# Patient Record
Sex: MALE | Race: WHITE | Employment: OTHER | URBAN - METROPOLITAN AREA
[De-identification: names, ages, dates, MRNs, and addresses within clinical notes are randomized per-mention and may not be internally consistent; named-entity substitution may affect disease eponyms.]

---

## 2017-04-17 ENCOUNTER — OFFICE VISIT (OUTPATIENT)
Dept: DERMATOLOGY | Facility: AMBULATORY SURGERY CENTER | Age: 82
End: 2017-04-17

## 2017-04-17 ENCOUNTER — HOSPITAL ENCOUNTER (OUTPATIENT)
Dept: LAB | Age: 82
Discharge: HOME | End: 2017-04-17

## 2017-04-17 VITALS
SYSTOLIC BLOOD PRESSURE: 140 MMHG | DIASTOLIC BLOOD PRESSURE: 72 MMHG | HEART RATE: 75 BPM | HEIGHT: 66 IN | TEMPERATURE: 97.5 F | OXYGEN SATURATION: 97 %

## 2017-04-17 DIAGNOSIS — L57.0 ACTINIC KERATOSIS: ICD-10-CM

## 2017-04-17 DIAGNOSIS — L90.5 SCAR CONDITION AND FIBROSIS OF SKIN: ICD-10-CM

## 2017-04-17 DIAGNOSIS — D18.01 CHERRY ANGIOMA: ICD-10-CM

## 2017-04-17 DIAGNOSIS — D48.5 NEOPLASM OF UNCERTAIN BEHAVIOR OF SKIN OF LOWER LEG: Primary | ICD-10-CM

## 2017-04-17 DIAGNOSIS — L82.1 SEBORRHEIC KERATOSES: ICD-10-CM

## 2017-04-17 DIAGNOSIS — Z85.828 HISTORY OF NONMELANOMA SKIN CANCER: ICD-10-CM

## 2017-04-17 NOTE — PROGRESS NOTES
Name: Aletha Morris       Age: 80 y.o. Date: 4/17/2017    Chief Complaint:   Chief Complaint   Patient presents with    Skin Exam     6 month, concerned about bump on Right lower leg, tender       Subjective:    HPI  Mr. Aletha Morris is a 80 y.o. male who presents for a full skin exam.  The patient's last skin exam was 6 months ago and the patient does have current complaints related to his skin. He reports a tender lesion on the right lower leg. He states this has been present for many weeks. Initially he was able to pull off the crust and it bled. However this crust did reform. He is also where multiple scaly lesions on his face including one on the left cheek that is sensitive. Mr. Aletha Morris is feeling well and in his usual state of health today. The patient's pertinent skin history includes : Multiple nonmelanoma skin cancer with the most recent being Mohs surgery for a basal cell carcinoma on the left nasal tip 11/3/16. Actinic keratosis with use of 5 fluorouracil. ROS: Constitutional: Negative. Dermatological : positive for - skin lesion changes      Social History     Social History    Marital status:      Spouse name: N/A    Number of children: N/A    Years of education: N/A     Occupational History    Not on file.      Social History Main Topics    Smoking status: Never Smoker    Smokeless tobacco: Never Used    Alcohol use 0.0 oz/week     1 - 2 Standard drinks or equivalent per week      Comment: occ, moderatley    Drug use: No    Sexual activity: Yes     Partners: Female     Birth control/ protection: None     Other Topics Concern    Not on file     Social History Narrative       Family History   Problem Relation Age of Onset    Heart Disease Mother     Heart Disease Father     Arthritis-rheumatoid Neg Hx        Past Medical History:   Diagnosis Date    Arthritis     knees liliya    Asthma     Empyema of lung ([facility] [address] 2/2013    Hypercholesteremia     Hypertension     Hypertrophy of prostate without urinary obstruction and other lower urinary tract symptoms (LUTS)     Skin cancer     Sun-damaged skin     Type II or unspecified type diabetes mellitus without mention of complication, not stated as uncontrolled 2/27/2013       Past Surgical History:   Procedure Laterality Date    HX APPENDECTOMY      when i was a kid    HX HEENT      cataracts removed bilat.  HX KNEE REPLACEMENT  01/25/14    right knee    HX ORTHOPAEDIC  2007    TKR Left    HX OTHER SURGICAL  2/8/2013    LVATS, pulmonary decortication       Current Outpatient Prescriptions   Medication Sig Dispense Refill    CHOLECALCIFEROL, VITAMIN D3, (VITAMIN D3 PO) Take  by mouth.  niacin ER (NIASPAN) 500 mg tablet Take 1 Tab by mouth daily (after dinner). 90 Tab 3    albuterol (PROVENTIL HFA) 90 mcg/actuation inhaler Take 2 Puffs by inhalation every four (4) hours as needed for Wheezing or Shortness of Breath. 1 Inhaler 1    losartan-hydrochlorothiazide (HYZAAR) 100-25 mg per tablet Take 1 Tab by mouth daily. 90 Tab 3    simvastatin (ZOCOR) 20 mg tablet TAKE 1 TABLET AT NIGHT 90 Tab 3    tamsulosin (FLOMAX) 0.4 mg capsule Take 0.4 mg by mouth daily.  celecoxib (CELEBREX) 200 mg capsule Take 1 Cap by mouth as directed. Take 1 tablet once or twice a day as needed for arthritis pain. 180 Cap 3    Blood-Glucose Meter (ONE TOUCH ULTRA SYSTEM KIT) monitoring kit Use as directed daily 1 Kit 0    glucose blood VI test strips (ONE TOUCH ULTRA TEST) strip Monitor BS daily 1 Package 11    Lancets (ONE TOUCH ULTRASOFT LANCETS) Misc Monitor BS daily 1 Package 11    aspirin 81 mg tablet Take 81 mg by mouth.  finasteride (PROSCAR) 5 mg tablet Take 5 mg by mouth daily.  bupivacaine-EPINEPHrine (MARCAINE-EPINEPHRINE) 0.25 %-1:200,000 soln Used for mohs surgery 1.5 mL 0    fluoruracil (CARAC) 0.5 % topical cream Apply  to affected area daily. Apply to lesions as directed.   Apply to right arm and hand for 1 month, then left arm and hand for 1 month, then front of lower legs for 1 month. 45 g 3    PREDNISONE by Does Not Apply route.  mometasone (NASONEX) 50 mcg/actuation nasal spray 2 Sprays by Both Nostrils route daily. 1 Container 5    HYDROcodone-homatropine (HYCODAN) 5-1.5 mg/5 mL (5 mL) syrup Take 5 mL by mouth three (3) times daily as needed. 150 mL 0    levofloxacin (LEVAQUIN) 500 mg tablet Take 1 Tab by mouth daily. 10 Tab 0    alfuzosin (UROXATRAL) 10 mg SR tablet Take 10 mg by mouth daily. Allergies   Allergen Reactions    Pcn [Penicillins] Swelling         Objective:    Visit Vitals    /72 (BP 1 Location: Left arm, BP Patient Position: Sitting)    Pulse 75    Temp 97.5 °F (36.4 °C)    Ht 5' 6\" (1.676 m)    SpO2 97%       Jazmin Alatorre is a 80 y.o. male who appears well and in no distress. He is awake, alert, and oriented. There is no preauricular, submandibular, or cervical lymphadenopathy. A skin examination was performed including his scalp, face (including eyelids), ears, neck, chest, back, abdomen, upper extremities (including digits/nails), lower extremities, breasts, buttocks; genital skin was not examined. There is a 1 cm x 1 cm erythematous papule on the right lower leg with a central crust, concerning for thickened AK versus squamous cell carcinoma. This is the patient's lesion of concern and is tender to touch. There are thin scaled actinic keratosis noted on the right helical rim right preauricular skin, left cheek, and left helical rim. There are numerous waxy macules and keratotic papules consistent with seborrheic keratoses, noninflamed. There are scattered cherry angiomas. There are multiple well-healed scars including the most recent of the left nasal tip, all without concerning features for lesion recurrence. He is fair skinned with lentigines on sun exposed areas. Assessment/Plan:  1.  Neoplasm of Uncertain Behavior, right lower leg, favor SCC. The differential diagnoses were discussed. Curettage was advised to address this lesion with malignant destruction. The procedure was reviewed and verbal and written consent were obtained. The risks of pain, bleeding, infection, scar, and recurrence of the lesion were discussed. I performed the procedure. The site was cleansed and anesthetized with 1% Lidocaine with Epinephrine 1:100,000. Curettage was performed by me to include a 2 mm margin, resulting in a post curettage defect size of 14 x 14 mm. Drysol was used for hemostasis. The wound was bandaged and care reviewed. The specimen was sent to pathology. I will contact the patient with the results and any further treatment that may be necessary. 2. Actinic Keratoses. The diagnosis of this precancerous lesion related to sun exposure was reviewed. Verbal consent was obtained. I treated 7 lesions with cryotherapy and post-cryotherapy care was reviewed. 3. Seborrheic keratoses. The diagnosis was reviewed and the patient was reassured that no treatment is needed for these benign lesions. 4. Personal history of skin cancer. I discussed sun protection, sunscreen use, the warning signs of skin cancer, the need for self-skin examinations, and the need for regular practitioner exams every 6 months. The patient should follow up sooner as needed if new, changing, or symptomatic skin lesions arise. 5. Cherry angiomas. The diagnosis was reviewed and the patient was reassured that no treatment is needed for these benign lesions. 6. Solar lentigos. The diagnosis and relationship to sun exposure was reviewed. Sun protection advised. [facility]   OFFICE PROCEDURE PROGRESS NOTE   Chart reviewed for the following:   I, [facility], have reviewed the History, Physical and updated the Allergic reactions for Esteban Borjas.     TIME OUT performed immediately prior to start of procedure: Rupert Solis, have performed the following reviews on Viri Victoria   prior to the start of the procedure:     * Patient was identified by name and date of birth   * Agreement on procedure being performed was verified   * Risks and Benefits explained to the patient   * Procedure site verified and marked as necessary   * Patient was positioned for comfort   * Consent was signed and verified     Time: 1050  Date of procedure: 4/17/2017  Procedure performed by: Kevin Coffey.  Lizzeth Solis  Provider assisted by: lpn   Patient assisted by: self   How tolerated by patient: tolerated the procedure well with no complications   Comments: none

## 2017-04-17 NOTE — MR AVS SNAPSHOT
Visit Information Date & Time Provider Department Dept. Phone Encounter #  
 4/17/2017 10:30 AM ASUNCION Bland57 [phone] [id number] Your Appointments 4/17/2017 10:30 AM  
Office Visit with ASUNCION Bland [address] [facility]) Appt Note: est pt; 6 mo skin exam - H/O NMSC wife is scheduled at  10:00  
 [address] [address] Upcoming Health Maintenance Date Due DTaP/Tdap/Td series (1 - Tdap) [birth year]/26/1954 ZOSTER VACCINE AGE 60> 3/26/1993 GLAUCOMA SCREENING Q2Y 3/26/1998 Pneumococcal 65+ Low/Medium Risk (1 of 2 - PCV13) 3/26/1998 MEDICARE YEARLY EXAM 3/26/1998 FOOT EXAM Q1 5/31/2014 MICROALBUMIN Q1 5/31/2014 LIPID PANEL Q1 5/31/2014 HEMOGLOBIN A1C Q6M 7/15/2014 EYE EXAM RETINAL OR DILATED Q1 12/18/2014 INFLUENZA AGE 9 TO ADULT 8/1/2016 Allergies as of 4/17/2017  Review Complete On: 4/17/2017 By: Дмитрий Muñoz Severity Noted Reaction Type Reactions Pcn [Penicillins]  10/26/2009    Swelling Current Immunizations  Reviewed on 2/12/2013 Name Date Influenza Vaccine Split 10/10/2012 Not reviewed this visit Vitals BP Pulse Temp Height(growth percentile) SpO2 Smoking Status 140/72 (BP 1 Location: Left arm, BP Patient Position: Sitting) 75 97.5 °F (36.4 °C) 5' 6\" (1.676 m) 97% Never Smoker Preferred Pharmacy Pharmacy Name Phone [address] [facility], [address] [facility] [address] Your Updated Medication List  
  
   
This list is accurate as of: 4/17/17 10:11 AM.  Always use your most recent med list.  
  
  
  
  
 albuterol 90 mcg/actuation inhaler Commonly known as:  PROVENTIL HFA Take 2 Puffs by inhalation every four (4) hours as needed for Wheezing or Shortness of Breath. aspirin 81 mg tablet Take 81 mg by mouth. Blood-Glucose Meter monitoring kit Commonly known as:  [address]   [address] KIT Use as directed daily  
  
 bupivacaine-EPINEPHrine 0.25 %-1:200,000 Soln Commonly known as:  Henrietta Velazquez Used for mohs surgery  
  
 celecoxib 200 mg capsule Commonly known as:  CeleBREX Take 1 Cap by mouth as directed. Take 1 tablet once or twice a day as needed for arthritis pain. finasteride 5 mg tablet Commonly known as:  PROSCAR Take 5 mg by mouth daily. FLOMAX 0.4 mg capsule Generic drug:  tamsulosin Take 0.4 mg by mouth daily. fluoruracil 0.5 % topical cream  
Commonly known as:  Pedro Axe Apply  to affected area daily. Apply to lesions as directed. Apply to right arm and hand for 1 month, then left arm and hand for 1 month, then front of lower legs for 1 month. glucose blood VI test strips strip Commonly known as:  ONETOUCH ULTRA TEST Monitor BS daily HYDROcodone-homatropine 5-1.5 mg/5 mL (5 mL) syrup Commonly known as:  HYCODAN (WITH HOMATROPIN) Take 5 mL by mouth three (3) times daily as needed. Lancets Misc Commonly known as:  ONETOUCH ULTRASOFT LANCETS Monitor BS daily  
  
 levoFLOXacin 500 mg tablet Commonly known as:  Elester Stalls Take 1 Tab by mouth daily. losartan-hydroCHLOROthiazide 100-25 mg per tablet Commonly known as:  HYZAAR Take 1 Tab by mouth daily. mometasone 50 mcg/actuation nasal spray Commonly known as:  NASONEX  
2 Sprays by Both Nostrils route daily. niacin  mg tablet Commonly known as:  Ashlee Solum Take 1 Tab by mouth daily (after dinner). PREDNISONE  
by Does Not Apply route. simvastatin 20 mg tablet Commonly known as:  ZOCOR  
TAKE 1 TABLET AT NIGHT  
  
 UROXATRAL 10 mg SR tablet Generic drug:  alfuzosin SR Take 10 mg by mouth daily. VITAMIN D3 PO Take  by mouth. Patient Instructions Self Skin Exam and Sunscreens Early detection and treatment is essential in the treatment of all forms of skin cancer. If caught early, all forms of skin cancer are curable. In addition to your regular visits, you should perform a monthly skin examination. Over time, you become familiar with what is normally found on your skin and can identify new or suspicious spots. One of the screening tools you can use to assess your skin is to follow the ABCDEs: 
 
A= Asymmetry (One half is unlike the other half) B= Border (An irregular, scalloped or poorly defined edge) C= Color (Is varied from one area to another, has shades of tan, brown/ black,       white, red or blue) D= Diameter (Spots larger than 6mm or a pencil eraser) E= Evolving (New spots or one that is changing in size, shape, or color) A follow- up interval will be customized based on your history of skin cancer or level of skin damage and risk factors. In any case, if you notice a suspicious or new spot, an appointment should be arranged between regular visits. Everyone should use sunscreen and sun-safe practices, which is especially important for those with a personal or family history of skin cancer. Suggestions for this include: 1. Use daily moisturizers containing SPF 30 or higher. 2. Wear long sleeve clothing with UPF ratings and a broad-brimmed hat. 3. Apply sunscreen with SPF 30 or higher to all sun exposed areas if you are going to be in the sun. A broad spectrum UVA/ UVB sunscreen is best.  Dont forget to REAPPLY every two hours or more often if swimming or sweating! 4. Avoid outside activities during peak sun hours, especially in the summer (10am- 2pm). 5. DO NOT use tanning beds. Using sunscreen and sun-safe practices can help reduce the likelihood of developing skin cancer or additional skin cancers in those previously diagnosed. Introducing [facility]! Dear Radha Luna: Thank you for requesting a MashMango account. Our records indicate that you already have an active MashMango account. You can access your account anytime at [email or web address] Did you know that you can access your hospital and ER discharge instructions at any time in MashMango? You can also review all of your test results from your hospital stay or ER visit. Additional Information If you have questions, please visit the Frequently Asked Questions section of the MashMango website at [email or web address] Remember, MashMango is NOT to be used for urgent needs. For medical emergencies, dial 911. Now available from your iPhone and Android! Please provide this summary of care documentation to your next provider. Your primary care clinician is listed as Thor Pastel. If you have any questions after today's visit, please call [phone].

## 2017-04-19 NOTE — PROGRESS NOTES
I spoke with the patient - he states the leg is feeling okay. He understands the diagnosis of SCC and that this was treated with curettage at the visit. He has a return visit in 6 months.

## 2017-08-25 ENCOUNTER — HOSPITAL ENCOUNTER (OUTPATIENT)
Dept: MRI IMAGING | Age: 82
Discharge: HOME | End: 2017-08-25
Attending: PHYSICAL MEDICINE & REHABILITATION
Payer: MEDICARE

## 2017-08-25 DIAGNOSIS — M48.061 SPINAL STENOSIS, LUMBAR REGION, WITHOUT NEUROGENIC CLAUDICATION: ICD-10-CM

## 2017-08-25 DIAGNOSIS — M51.26 DISPLACEMENT OF LUMBAR INTERVERTEBRAL DISC WITHOUT MYELOPATHY: ICD-10-CM

## 2017-08-25 PROCEDURE — 72148 MRI LUMBAR SPINE W/O DYE: CPT

## 2017-09-07 ENCOUNTER — APPOINTMENT (OUTPATIENT)
Dept: CT IMAGING | Age: 82
End: 2017-09-07
Attending: EMERGENCY MEDICINE
Payer: MEDICARE

## 2017-09-07 ENCOUNTER — APPOINTMENT (OUTPATIENT)
Dept: GENERAL RADIOLOGY | Age: 82
End: 2017-09-07
Attending: PHYSICIAN ASSISTANT
Payer: MEDICARE

## 2017-09-07 ENCOUNTER — HOSPITAL ENCOUNTER (EMERGENCY)
Age: 82
Discharge: HOME | End: 2017-09-07
Attending: EMERGENCY MEDICINE | Admitting: EMERGENCY MEDICINE
Payer: MEDICARE

## 2017-09-07 VITALS
WEIGHT: 155 LBS | BODY MASS INDEX: 24.91 KG/M2 | SYSTOLIC BLOOD PRESSURE: 136 MMHG | RESPIRATION RATE: 22 BRPM | HEIGHT: 66 IN | TEMPERATURE: 97.9 F | HEART RATE: 74 BPM | DIASTOLIC BLOOD PRESSURE: 61 MMHG | OXYGEN SATURATION: 95 %

## 2017-09-07 DIAGNOSIS — J18.9 CAP (COMMUNITY ACQUIRED PNEUMONIA): Primary | ICD-10-CM

## 2017-09-07 LAB
ALBUMIN SERPL-MCNC: 3.5 G/DL (ref 3.5–5)
ALBUMIN/GLOB SERPL: 0.8 {RATIO} (ref 1.1–2.2)
ALP SERPL-CCNC: 58 U/L (ref 45–117)
ALT SERPL-CCNC: 16 U/L (ref 12–78)
ANION GAP SERPL CALC-SCNC: 6 MMOL/L (ref 5–15)
APPEARANCE UR: CLEAR
AST SERPL-CCNC: 12 U/L (ref 15–37)
ATRIAL RATE: 75 BPM
BASOPHILS # BLD: 0 K/UL (ref 0–0.1)
BASOPHILS NFR BLD: 0 % (ref 0–1)
BILIRUB SERPL-MCNC: 0.5 MG/DL (ref 0.2–1)
BILIRUB UR QL: NEGATIVE
BUN SERPL-MCNC: 15 MG/DL (ref 6–20)
BUN/CREAT SERPL: 17 (ref 12–20)
CALCIUM SERPL-MCNC: 9.8 MG/DL (ref 8.5–10.1)
CALCULATED P AXIS, ECG09: 20 DEGREES
CALCULATED R AXIS, ECG10: 29 DEGREES
CALCULATED T AXIS, ECG11: 33 DEGREES
CHLORIDE SERPL-SCNC: 103 MMOL/L (ref 97–108)
CO2 SERPL-SCNC: 30 MMOL/L (ref 21–32)
COLOR UR: NORMAL
CREAT SERPL-MCNC: 0.88 MG/DL (ref 0.7–1.3)
DIAGNOSIS, 93000: NORMAL
EOSINOPHIL # BLD: 0 K/UL (ref 0–0.4)
EOSINOPHIL NFR BLD: 0 % (ref 0–7)
ERYTHROCYTE [DISTWIDTH] IN BLOOD BY AUTOMATED COUNT: 12.7 % (ref 11.5–14.5)
GLOBULIN SER CALC-MCNC: 4.2 G/DL (ref 2–4)
GLUCOSE SERPL-MCNC: 131 MG/DL (ref 65–100)
GLUCOSE UR STRIP.AUTO-MCNC: NEGATIVE MG/DL
HCT VFR BLD AUTO: 39.5 % (ref 36.6–50.3)
HGB BLD-MCNC: 13.2 G/DL (ref 12.1–17)
HGB UR QL STRIP: NEGATIVE
KETONES UR QL STRIP.AUTO: NEGATIVE MG/DL
LEUKOCYTE ESTERASE UR QL STRIP.AUTO: NEGATIVE
LYMPHOCYTES # BLD: 1.6 K/UL (ref 0.8–3.5)
LYMPHOCYTES NFR BLD: 11 % (ref 12–49)
MAGNESIUM SERPL-MCNC: 2 MG/DL (ref 1.6–2.4)
MCH RBC QN AUTO: 29.6 PG (ref 26–34)
MCHC RBC AUTO-ENTMCNC: 33.4 G/DL (ref 30–36.5)
MCV RBC AUTO: 88.6 FL (ref 80–99)
MONOCYTES # BLD: 0.9 K/UL (ref 0–1)
MONOCYTES NFR BLD: 6 % (ref 5–13)
NEUTS SEG # BLD: 11.8 K/UL (ref 1.8–8)
NEUTS SEG NFR BLD: 83 % (ref 32–75)
NITRITE UR QL STRIP.AUTO: NEGATIVE
P-R INTERVAL, ECG05: 178 MS
PH UR STRIP: 7.5 [PH] (ref 5–8)
PLATELET # BLD AUTO: 281 K/UL (ref 150–400)
POTASSIUM SERPL-SCNC: 4.1 MMOL/L (ref 3.5–5.1)
PROT SERPL-MCNC: 7.7 G/DL (ref 6.4–8.2)
PROT UR STRIP-MCNC: NEGATIVE MG/DL
Q-T INTERVAL, ECG07: 378 MS
QRS DURATION, ECG06: 78 MS
QTC CALCULATION (BEZET), ECG08: 422 MS
RBC # BLD AUTO: 4.46 M/UL (ref 4.1–5.7)
SODIUM SERPL-SCNC: 139 MMOL/L (ref 136–145)
SP GR UR REFRACTOMETRY: 1.01 (ref 1–1.03)
TROPONIN I SERPL-MCNC: <0.04 NG/ML
UR CULT HOLD, URHOLD: NORMAL
UROBILINOGEN UR QL STRIP.AUTO: 0.2 EU/DL (ref 0.2–1)
VENTRICULAR RATE, ECG03: 75 BPM
WBC # BLD AUTO: 14.2 K/UL (ref 4.1–11.1)

## 2017-09-07 PROCEDURE — 99285 EMERGENCY DEPT VISIT HI MDM: CPT

## 2017-09-07 PROCEDURE — 83735 ASSAY OF MAGNESIUM: CPT | Performed by: PHYSICIAN ASSISTANT

## 2017-09-07 PROCEDURE — 81003 URINALYSIS AUTO W/O SCOPE: CPT | Performed by: PHYSICIAN ASSISTANT

## 2017-09-07 PROCEDURE — 71020 XR CHEST PA LAT: CPT

## 2017-09-07 PROCEDURE — 93005 ELECTROCARDIOGRAM TRACING: CPT

## 2017-09-07 PROCEDURE — 36415 COLL VENOUS BLD VENIPUNCTURE: CPT | Performed by: PHYSICIAN ASSISTANT

## 2017-09-07 PROCEDURE — 74011250637 HC RX REV CODE- 250/637: Performed by: EMERGENCY MEDICINE

## 2017-09-07 PROCEDURE — 85025 COMPLETE CBC W/AUTO DIFF WBC: CPT | Performed by: PHYSICIAN ASSISTANT

## 2017-09-07 PROCEDURE — 84484 ASSAY OF TROPONIN QUANT: CPT | Performed by: PHYSICIAN ASSISTANT

## 2017-09-07 PROCEDURE — 74011636320 HC RX REV CODE- 636/320: Performed by: RADIOLOGY

## 2017-09-07 PROCEDURE — 71275 CT ANGIOGRAPHY CHEST: CPT

## 2017-09-07 PROCEDURE — 80053 COMPREHEN METABOLIC PANEL: CPT | Performed by: PHYSICIAN ASSISTANT

## 2017-09-07 RX ORDER — DOXYCYCLINE HYCLATE 100 MG
100 TABLET ORAL 2 TIMES DAILY
Qty: 20 TAB | Refills: 0 | Status: SHIPPED | OUTPATIENT
Start: 2017-09-07 | End: 2017-09-17

## 2017-09-07 RX ORDER — DOXYCYCLINE HYCLATE 100 MG
100 TABLET ORAL
Status: COMPLETED | OUTPATIENT
Start: 2017-09-07 | End: 2017-09-07

## 2017-09-07 RX ADMIN — DOXYCYCLINE HYCLATE 100 MG: 100 TABLET, COATED ORAL at 12:38

## 2017-09-07 RX ADMIN — IOPAMIDOL 85 ML: 755 INJECTION, SOLUTION INTRAVENOUS at 10:35

## 2017-09-07 NOTE — DISCHARGE INSTRUCTIONS
Pneumonia: Care Instructions  Your Care Instructions    Pneumonia is an infection of the lungs. Most cases are caused by infections from bacteria or viruses. Pneumonia may be mild or very severe. If it is caused by bacteria, you will be treated with antibiotics. It may take a few weeks to a few months to recover fully from pneumonia, depending on how sick you were and whether your overall health is good. Follow-up care is a key part of your treatment and safety. Be sure to make and go to all appointments, and call your doctor if you are having problems. Its also a good idea to know your test results and keep a list of the medicines you take. How can you care for yourself at home? · Take your antibiotics exactly as directed. Do not stop taking the medicine just because you are feeling better. You need to take the full course of antibiotics. · Take your medicines exactly as prescribed. Call your doctor if you think you are having a problem with your medicine. · Get plenty of rest and sleep. You may feel weak and tired for a while, but your energy level will improve with time. · To prevent dehydration, drink plenty of fluids, enough so that your urine is light yellow or clear like water. Choose water and other caffeine-free clear liquids until you feel better. If you have kidney, heart, or liver disease and have to limit fluids, talk with your doctor before you increase the amount of fluids you drink. · Take care of your cough so you can rest. A cough that brings up mucus from your lungs is common with pneumonia. It is one way your body gets rid of the infection. But if coughing keeps you from resting or causes severe fatigue and chest-wall pain, talk to your doctor. He or she may suggest that you take a medicine to reduce the cough. · Use a vaporizer or humidifier to add moisture to your bedroom. Follow the directions for cleaning the machine. · Do not smoke or allow others to smoke around you.  Smoke will make your cough last longer. If you need help quitting, talk to your doctor about stop-smoking programs and medicines. These can increase your chances of quitting for good. · Take an over-the-counter pain medicine, such as acetaminophen (Tylenol), ibuprofen (Advil, Motrin), or naproxen (Aleve). Read and follow all instructions on the label. · Do not take two or more pain medicines at the same time unless the doctor told you to. Many pain medicines have acetaminophen, which is Tylenol. Too much acetaminophen (Tylenol) can be harmful. · If you were given a spirometer to measure how well your lungs are working, use it as instructed. This can help your doctor tell how your recovery is going. · To prevent pneumonia in the future, talk to your doctor about getting a flu vaccine (once a year) and a pneumococcal vaccine (one time only for most people). When should you call for help? Call 911 anytime you think you may need emergency care. For example, call if:  · You have severe trouble breathing. Call your doctor now or seek immediate medical care if:  · You cough up dark brown or bloody mucus (sputum). · You have new or worse trouble breathing. · You are dizzy or lightheaded, or you feel like you may faint. Watch closely for changes in your health, and be sure to contact your doctor if:  · You have a new or higher fever. · You are coughing more deeply or more often. · You are not getting better after 2 days (48 hours). · You do not get better as expected. Where can you learn more? Go to http://vy-miles.info/. Enter [id number] in the search box to learn more about \"Pneumonia: Care Instructions. \"  Current as of: March 25, 2017  Content Version: 11.3  © 5346-2614 [employer]. Care instructions adapted under license by [employer] (which disclaims liability or warranty for this information).  If you have questions about a medical condition or this instruction, always ask your healthcare professional. [place] [address] any warranty or liability for your use of this information. We hope that we have addressed all of your medical concerns. The examination and treatment you received in the Emergency Department were for an emergent problem and were not intended as complete care. It is important that you follow up with your healthcare provider(s) for ongoing care. If your symptoms worsen or do not improve as expected, and you are unable to reach your usual health care provider(s), you should return to the Emergency Department. Today's healthcare is undergoing tremendous change, and patient satisfaction surveys are one of the many tools to assess the quality of medical care. You may receive a survey from the [employer] regarding your experience in the Emergency Department. I hope that your experience has been completely positive, particularly the medical care that I provided. As such, please participate in the survey; anything less than excellent does not meet my expectations or intentions. [facility] and [address] [place] [address] participate in nationally recognized quality of care measures. If your blood pressure is greater than 120/80, as reported below, we urge that you seek medical care to address the potential of high blood pressure, commonly known as hypertension. Hypertension can be hereditary or can be caused by certain medical conditions, pain, stress, or \"white coat syndrome. \"       Please make an appointment with your health care provider(s) for follow up of your Emergency Department visit.        VITALS:   Patient Vitals for the past 8 hrs:   Temp Pulse Resp BP SpO2   09/07/17 1000 - 70 19 142/61 95 %   09/07/17 0830 - 72 18 154/65 96 %   09/07/17 0817 - 78 - - 97 %   09/07/17 0802 - - - - 98 %   09/07/17 0800 - - - 162/78 -   09/07/17 0749 97.9 °F (36.6 °C) 81 16 176/81 95 %          Thank you for allowing us to provide you with medical care today. We realize that you have many choices for your emergency care needs. Please choose us in the future for any continued health care needs. [facility].   Office: [phone]            Recent Results (from the past 24 hour(s))   EKG, 12 LEAD, INITIAL    Collection Time: 09/07/17  7:56 AM   Result Value Ref Range    Ventricular Rate 75 BPM    Atrial Rate 75 BPM    P-R Interval 178 ms    QRS Duration 78 ms    Q-T Interval 378 ms    QTC Calculation (Bezet) 422 ms    Calculated P Axis 20 degrees    Calculated R Axis 29 degrees    Calculated T Axis 33 degrees    Diagnosis       Normal sinus rhythm  Normal ECG  When compared with ECG of 15-REG-2014 09:43,  premature atrial complexes are no longer present     CBC WITH AUTOMATED DIFF    Collection Time: 09/07/17  8:17 AM   Result Value Ref Range    WBC 14.2 (H) 4.1 - 11.1 K/uL    RBC 4.46 4.10 - 5.70 M/uL    HGB 13.2 12.1 - 17.0 g/dL    HCT 39.5 36.6 - 50.3 %    MCV 88.6 80.0 - 99.0 FL    MCH 29.6 26.0 - 34.0 PG    MCHC 33.4 30.0 - 36.5 g/dL    RDW 12.7 11.5 - 14.5 %    PLATELET 917 619 - 318 K/uL    NEUTROPHILS 83 (H) 32 - 75 %    LYMPHOCYTES 11 (L) 12 - 49 %    MONOCYTES 6 5 - 13 %    EOSINOPHILS 0 0 - 7 %    BASOPHILS 0 0 - 1 %    ABS. NEUTROPHILS 11.8 (H) 1.8 - 8.0 K/UL    ABS. LYMPHOCYTES 1.6 0.8 - 3.5 K/UL    ABS. MONOCYTES 0.9 0.0 - 1.0 K/UL    ABS. EOSINOPHILS 0.0 0.0 - 0.4 K/UL    ABS.  BASOPHILS 0.0 0.0 - 0.1 K/UL   METABOLIC PANEL, COMPREHENSIVE    Collection Time: 09/07/17  8:17 AM   Result Value Ref Range    Sodium 139 136 - 145 mmol/L    Potassium 4.1 3.5 - 5.1 mmol/L    Chloride 103 97 - 108 mmol/L    CO2 30 21 - 32 mmol/L    Anion gap 6 5 - 15 mmol/L    Glucose 131 (H) 65 - 100 mg/dL    BUN 15 6 - 20 MG/DL    Creatinine 0.88 0.70 - 1.30 MG/DL    BUN/Creatinine ratio 17 12 - 20      GFR est AA >60 >60 ml/min/1.73m2    GFR est non-AA >60 >60 ml/min/1.73m2    Calcium 9.8 8.5 - 10.1 MG/DL    Bilirubin, total 0.5 0.2 - 1.0 MG/DL    ALT (SGPT) 16 12 - 78 U/L    AST (SGOT) 12 (L) 15 - 37 U/L    Alk. phosphatase 58 45 - 117 U/L    Protein, total 7.7 6.4 - 8.2 g/dL    Albumin 3.5 3.5 - 5.0 g/dL    Globulin 4.2 (H) 2.0 - 4.0 g/dL    A-G Ratio 0.8 (L) 1.1 - 2.2     MAGNESIUM    Collection Time: 09/07/17  8:17 AM   Result Value Ref Range    Magnesium 2.0 1.6 - 2.4 mg/dL   TROPONIN I    Collection Time: 09/07/17  8:17 AM   Result Value Ref Range    Troponin-I, Qt. <0.04 <0.05 ng/mL   URINALYSIS W/ RFLX MICROSCOPIC    Collection Time: 09/07/17  9:46 AM   Result Value Ref Range    Color YELLOW/STRAW      Appearance CLEAR CLEAR      Specific gravity 1.010 1.003 - 1.030      pH (UA) 7.5 5.0 - 8.0      Protein NEGATIVE  NEG mg/dL    Glucose NEGATIVE  NEG mg/dL    Ketone NEGATIVE  NEG mg/dL    Bilirubin NEGATIVE  NEG      Blood NEGATIVE  NEG      Urobilinogen 0.2 0.2 - 1.0 EU/dL    Nitrites NEGATIVE  NEG      Leukocyte Esterase NEGATIVE  NEG     URINE CULTURE HOLD SAMPLE    Collection Time: 09/07/17  9:46 AM   Result Value Ref Range    Urine culture hold URINE ON HOLD IN MICROBIOLOGY DEPT FOR 3 DAYS         Xr Chest Pa Lat    Result Date: 9/7/2017  Exam:  2 view chest Indication: Right-sided chest pain Comparison to 7/29/2016. PA and lateral views demonstrate normal heart size. Rounded parenchymal opacity is seen in the right lower lobe. Scarring is stable at the left lung base. Degenerative changes are noted in the thoracic spine. Impression: Rounded opacity right lower lobe. In the correct clinical setting this could be related to acute pneumonitis, however neoplasm cannot be excluded and follow-up is recommended to ensure complete resolution. Cta Chest W Or W Wo Cont    Result Date: 9/7/2017  CLINICAL HISTORY: Chest pain INDICATION: Chest pain COMPARISON: 04/09/2010 TECHNIQUE: CT of the chest with  IV contrast , Isovue-370 is performed.  Axial images from the thoracic inlet to the level of the upper abdomen are obtained. Manual post-processing of the images and coronal reformatting is also performed. CT dose reduction was achieved through use of a standardized protocol tailored for this examination and automatic exposure control for dose modulation. Multiplanar reformatted imaging was performed. Sagittal and coronal reformatting. 3-D Postprocessing of imaging was performed. 3-D MIP reconstructed images were obtained in the coronal plane. FINDINGS: There is no pulmonary embolism. There is no aortic aneurysm or dissection. Cholelithiasis. Hyperdense cyst. There is no mediastinal, axillary or hilar lymphadenopathy. There is no pleural or pericardial effusion. The aorta is normal in course and caliber. The proximal pulmonary arteries are without evidence of filling defects, the caliber of the pulmonary arteries is also normal. Diffuse centrilobular tree in bud nodules masslike consolidation at the pleural margin on the right. Bronchiectatic change with atelectasis and small effusions on the right and on the left. There is increased scarring in the bilateral lower lobes and the lingula. The central airways are patent. Three-vessel coronary artery calcifications are severe. Eura César IMPRESSION: There is no pulmonary embolism. There is no aortic aneurysm or dissection. Progressed bronchiectatic change, tree-in-bud nodularity, new masslike atelectasis/consolidation in the right middle lobe. In addition increased atelectatic change and scarring at the lung bases bilaterally with small right-sided effusion. These most likely related to progression of infectious/inflammatory pulmonary process previously described 8/31/2016.

## 2017-09-07 NOTE — ED PROVIDER NOTES
HPI Comments: Sayda Griffith is a 80 y.o. male who presents ambulatory with his wife to the ED with a c/o lower chest pain since last night with increased pain on inspiration and productive cough. Pt denies f/c, n/v/d, or abd sx. He denies sob, but notes he feels like he can not take a deep breath. He states he has a hx of asthma and has a chronic cough, but his sx today are worse than baseline. He denies recent travel, prolonged sitting, recent surgical procedure or injury. He notes he had a back injection last week and still has some sciatic pain from the injection, but denies new or worsening leg/ back pain. He specifically denies any headache, rash, sweating or weight loss. PCP: Waldemar Maria MD  Pulmonary: Dr Rommel Willis  PMHx significant for: Past Medical History:  No date: Arthritis      Comment: knees liliya  No date: Asthma  2/2013: Empyema of lung ([facility] [address]  No date: Hypercholesteremia  No date: Hypertension  No date: Hypertrophy of prostate without urinary obstru*  No date: Skin cancer  No date: Sun-damaged skin  2/27/2013: Type II or unspecified type diabetes mellitus *  PSHx significant for: Past Surgical History:  No date: HX APPENDECTOMY      Comment: when i was a kid  No date: HX HEENT      Comment: cataracts removed bilat. 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 The history is provided by the patient and the spouse.         Past Medical History:   Diagnosis Date    Arthritis     knees liliya    Asthma     Empyema of lung ([address] 2/2013    Hypercholesteremia     Hypertension     Hypertrophy of prostate without urinary obstruction and other lower urinary tract symptoms (LUTS)     Skin cancer     Sun-damaged skin     Type II or unspecified type diabetes mellitus without mention of complication, not stated as uncontrolled 2/27/2013       Past Surgical History:   Procedure Laterality Date    HX APPENDECTOMY      when i was a kid    HX HEENT      cataracts removed bilat.  HX KNEE REPLACEMENT  01/25/14    right knee    HX ORTHOPAEDIC  2007    TKR Left    HX OTHER SURGICAL  2/8/2013    LVATS, pulmonary decortication         Family History:   Problem Relation Age of Onset    Heart Disease Mother     Heart Disease Father     Arthritis-rheumatoid Neg Hx        Social History     Social History    Marital status:      Spouse name: N/A    Number of children: N/A    Years of education: N/A     Occupational History    Not on file. Social History Main Topics    Smoking status: Never Smoker    Smokeless tobacco: Never Used    Alcohol use 0.0 oz/week     1 - 2 Standard drinks or equivalent per week      Comment: occ, moderatley    Drug use: No    Sexual activity: Yes     Partners: Female     Birth control/ protection: None     Other Topics Concern    Not on file     Social History Narrative         ALLERGIES: Pcn [penicillins]    Review of Systems   Constitutional: Negative for chills and fever. HENT: Negative for congestion, rhinorrhea, sneezing and sore throat. Eyes: Negative for redness and visual disturbance. Respiratory: Positive for cough. Negative for shortness of breath. Cardiovascular: Positive for chest pain. Negative for leg swelling. Gastrointestinal: Negative for abdominal pain, nausea and vomiting. Genitourinary: Negative for difficulty urinating and frequency. Musculoskeletal: Negative for back pain, myalgias and neck stiffness. Skin: Negative for rash. Neurological: Negative for dizziness, syncope, weakness and headaches. Hematological: Negative for adenopathy.        Patient Vitals for the past 12 hrs:   Temp Pulse Resp BP SpO2   09/07/17 1100 - 74 22 136/61 95 %   09/07/17 1030 - 78 21 158/67 97 %   09/07/17 1000 - 70 19 142/61 95 %   09/07/17 0830 - 72 18 154/65 96 %   09/07/17 0817 - 78 - - 97 %   09/07/17 0802 - - - - 98 %   09/07/17 0800 - - - 162/78 -   09/07/17 0749 97.9 °F (36.6 °C) 81 16 176/81 95 %              Physical Exam   Constitutional: He is oriented to person, place, and time. He appears well-developed and well-nourished. No distress. HENT:   Head: Normocephalic and atraumatic. Right Ear: External ear normal.   Left Ear: External ear normal.   Nose: Nose normal.   Mouth/Throat: Oropharynx is clear and moist. No oropharyngeal exudate. Eyes: EOM are normal. Pupils are equal, round, and reactive to light. Neck: Neck supple. No JVD present. No tracheal deviation present. Cardiovascular: Normal rate, regular rhythm, normal heart sounds and intact distal pulses. Exam reveals no gallop and no friction rub. No murmur heard. Pulmonary/Chest: Effort normal. No stridor. No respiratory distress. He has no wheezes. He has no rales. He exhibits no tenderness. Rhonchi to right lower lung field without wheezes or rales. Abdominal: Soft. Bowel sounds are normal. He exhibits no distension and no mass. There is no tenderness. There is no rebound and no guarding. Musculoskeletal: Normal range of motion. He exhibits no edema, tenderness or deformity. Lymphadenopathy:     He has no cervical adenopathy. Neurological: He is alert and oriented to person, place, and time. No cranial nerve deficit. Coordination normal.   Skin: No rash noted. No erythema. No pallor. Psychiatric: He has a normal mood and affect. His behavior is normal.   Nursing note and vitals reviewed.        MDM  Number of Diagnoses or Management Options  CAP (community acquired pneumonia):      Amount and/or Complexity of Data Reviewed  Clinical lab tests: ordered and reviewed  Tests in the radiology section of CPT®: ordered and reviewed  Tests in the medicine section of CPT®: ordered and reviewed  Obtain history from someone other than the patient: yes (Wife)  Review and summarize past medical records: yes  Independent visualization of images, tracings, or specimens: yes    Patient Progress  Patient progress: stable    ED Course       Procedures    ED EKG interpretation: 7:56 AM  Rhythm: normal sinus rhythm; and regular . Rate (approx.): 75; Axis: normal; P wave: normal; QRS interval: normal ; ST/T wave: normal; Other findings: normal EKG. This EKG was interpreted by No att. providers found,ED Provider. 9:29 AM  Discussed pt, sx, hx and current findings with Dr Maria E Hernandes. He is in agreement with plan and will see pt  Katleanuj Lux. NESSA Bush      10:30 AM   Dr Maria E Hernandes in to see pt. Will add ct chest to further eval sx/ pain  Katleen Lux. NESSA Bush    12:33 PM   Pt updated on current findings. Will discharge on doxy and tx for CAP  Brandanleen Lux. NESSA Bush    LABORATORY TESTS:  Recent Results (from the past 12 hour(s))   EKG, 12 LEAD, INITIAL    Collection Time: 09/07/17  7:56 AM   Result Value Ref Range    Ventricular Rate 75 BPM    Atrial Rate 75 BPM    P-R Interval 178 ms    QRS Duration 78 ms    Q-T Interval 378 ms    QTC Calculation (Bezet) 422 ms    Calculated P Axis 20 degrees    Calculated R Axis 29 degrees    Calculated T Axis 33 degrees    Diagnosis       Normal sinus rhythm  Normal ECG  When compared with ECG of 15-REG-2014 09:43,  premature atrial complexes are no longer present     CBC WITH AUTOMATED DIFF    Collection Time: 09/07/17  8:17 AM   Result Value Ref Range    WBC 14.2 (H) 4.1 - 11.1 K/uL    RBC 4.46 4.10 - 5.70 M/uL    HGB 13.2 12.1 - 17.0 g/dL    HCT 39.5 36.6 - 50.3 %    MCV 88.6 80.0 - 99.0 FL    MCH 29.6 26.0 - 34.0 PG    MCHC 33.4 30.0 - 36.5 g/dL    RDW 12.7 11.5 - 14.5 %    PLATELET 455 186 - 658 K/uL    NEUTROPHILS 83 (H) 32 - 75 %    LYMPHOCYTES 11 (L) 12 - 49 %    MONOCYTES 6 5 - 13 %    EOSINOPHILS 0 0 - 7 %    BASOPHILS 0 0 - 1 %    ABS. NEUTROPHILS 11.8 (H) 1.8 - 8.0 K/UL    ABS. LYMPHOCYTES 1.6 0.8 - 3.5 K/UL    ABS. MONOCYTES 0.9 0.0 - 1.0 K/UL    ABS. EOSINOPHILS 0.0 0.0 - 0.4 K/UL    ABS. BASOPHILS 0.0 0.0 - 0.1 K/UL   METABOLIC PANEL, COMPREHENSIVE    Collection Time: 09/07/17  8:17 AM   Result Value Ref Range    Sodium 139 136 - 145 mmol/L    Potassium 4.1 3.5 - 5.1 mmol/L    Chloride 103 97 - 108 mmol/L    CO2 30 21 - 32 mmol/L    Anion gap 6 5 - 15 mmol/L    Glucose 131 (H) 65 - 100 mg/dL    BUN 15 6 - 20 MG/DL    Creatinine 0.88 0.70 - 1.30 MG/DL    BUN/Creatinine ratio 17 12 - 20      GFR est AA >60 >60 ml/min/1.73m2    GFR est non-AA >60 >60 ml/min/1.73m2    Calcium 9.8 8.5 - 10.1 MG/DL    Bilirubin, total 0.5 0.2 - 1.0 MG/DL    ALT (SGPT) 16 12 - 78 U/L    AST (SGOT) 12 (L) 15 - 37 U/L    Alk. phosphatase 58 45 - 117 U/L    Protein, total 7.7 6.4 - 8.2 g/dL    Albumin 3.5 3.5 - 5.0 g/dL    Globulin 4.2 (H) 2.0 - 4.0 g/dL    A-G Ratio 0.8 (L) 1.1 - 2.2     MAGNESIUM    Collection Time: 09/07/17  8:17 AM   Result Value Ref Range    Magnesium 2.0 1.6 - 2.4 mg/dL   TROPONIN I    Collection Time: 09/07/17  8:17 AM   Result Value Ref Range    Troponin-I, Qt. <0.04 <0.05 ng/mL   URINALYSIS W/ RFLX MICROSCOPIC    Collection Time: 09/07/17  9:46 AM   Result Value Ref Range    Color YELLOW/STRAW      Appearance CLEAR CLEAR      Specific gravity 1.010 1.003 - 1.030      pH (UA) 7.5 5.0 - 8.0      Protein NEGATIVE  NEG mg/dL    Glucose NEGATIVE  NEG mg/dL    Ketone NEGATIVE  NEG mg/dL    Bilirubin NEGATIVE  NEG      Blood NEGATIVE  NEG      Urobilinogen 0.2 0.2 - 1.0 EU/dL    Nitrites NEGATIVE  NEG      Leukocyte Esterase NEGATIVE  NEG     URINE CULTURE HOLD SAMPLE    Collection Time: 09/07/17  9:46 AM   Result Value Ref Range    Urine culture hold URINE ON HOLD IN MICROBIOLOGY DEPT FOR 3 DAYS         IMAGING RESULTS:    Xr Chest Pa Lat    Result Date: 9/7/2017  Exam:  2 view chest Indication: Right-sided chest pain Comparison to 7/29/2016.  PA and lateral views demonstrate normal heart size. Rounded parenchymal opacity is seen in the right lower lobe. Scarring is stable at the left lung base. Degenerative changes are noted in the thoracic spine. Impression: Rounded opacity right lower lobe. In the correct clinical setting this could be related to acute pneumonitis, however neoplasm cannot be excluded and follow-up is recommended to ensure complete resolution. Cta Chest W Or W Wo Cont    Result Date: 9/7/2017  CLINICAL HISTORY: Chest pain INDICATION: Chest pain COMPARISON: 04/09/2010 TECHNIQUE: CT of the chest with  IV contrast , Isovue-370 is performed. Axial images from the thoracic inlet to the level of the upper abdomen are obtained. Manual post-processing of the images and coronal reformatting is also performed. CT dose reduction was achieved through use of a standardized protocol tailored for this examination and automatic exposure control for dose modulation. Multiplanar reformatted imaging was performed. Sagittal and coronal reformatting. 3-D Postprocessing of imaging was performed. 3-D MIP reconstructed images were obtained in the coronal plane. FINDINGS: There is no pulmonary embolism. There is no aortic aneurysm or dissection. Cholelithiasis. Hyperdense cyst. There is no mediastinal, axillary or hilar lymphadenopathy. There is no pleural or pericardial effusion. The aorta is normal in course and caliber. The proximal pulmonary arteries are without evidence of filling defects, the caliber of the pulmonary arteries is also normal. Diffuse centrilobular tree in bud nodules masslike consolidation at the pleural margin on the right. Bronchiectatic change with atelectasis and small effusions on the right and on the left. There is increased scarring in the bilateral lower lobes and the lingula. The central airways are patent. Three-vessel coronary artery calcifications are severe. Roseline Abel IMPRESSION: There is no pulmonary embolism.  There is no aortic aneurysm or dissection. Progressed bronchiectatic change, tree-in-bud nodularity, new masslike atelectasis/consolidation in the right middle lobe. In addition increased atelectatic change and scarring at the lung bases bilaterally with small right-sided effusion. These most likely related to progression of infectious/inflammatory pulmonary process previously described 8/31/2016. MEDICATIONS GIVEN:  Medications   iopamidol (ISOVUE-370) 76 % injection 100 mL (85 mL IntraVENous Given 9/7/17 1035)   doxycycline (VIBRA-TABS) tablet 100 mg (100 mg Oral Given 9/7/17 1238)       IMPRESSION:  1. CAP (community acquired pneumonia)        PLAN:  1. Discharge Medication List as of 9/7/2017 12:30 PM      START taking these medications    Details   doxycycline (VIBRA-TABS) 100 mg tablet Take 1 Tab by mouth two (2) times a day for 10 days. , Print, Disp-20 Tab, R-0         CONTINUE these medications which have NOT CHANGED    Details   bupivacaine-EPINEPHrine (MARCAINE-EPINEPHRINE) 0.25 %-1:200,000 soln Used for mohs surgery, No Print, Disp-1.5 mL, R-0      CHOLECALCIFEROL, VITAMIN D3, (VITAMIN D3 PO) Take  by mouth., Historical Med      fluoruracil (CARAC) 0.5 % topical cream Apply  to affected area daily. Apply to lesions as directed. Apply to right arm and hand for 1 month, then left arm and hand for 1 month, then front of lower legs for 1 month., Print, Disp-45 g, R-3      PREDNISONE by Does Not Apply route., Historical Med      niacin ER (NIASPAN) 500 mg tablet Take 1 Tab by mouth daily (after dinner). , Normal, Disp-90 Tab, R-3      mometasone (NASONEX) 50 mcg/actuation nasal spray 2 Sprays by Both Nostrils route daily. , Normal, Disp-1 Container, R-5      HYDROcodone-homatropine (HYCODAN) 5-1.5 mg/5 mL (5 mL) syrup Take 5 mL by mouth three (3) times daily as needed. , Print, Disp-150 mL, R-0      levofloxacin (LEVAQUIN) 500 mg tablet Take 1 Tab by mouth daily. , Normal, Disp-10 Tab, R-0      albuterol (PROVENTIL HFA) 90 mcg/actuation inhaler Take 2 Puffs by inhalation every four (4) hours as needed for Wheezing or Shortness of Breath., Normal, Disp-1 Inhaler, R-1      losartan-hydrochlorothiazide (HYZAAR) 100-25 mg per tablet Take 1 Tab by mouth daily. , Normal, Disp-90 Tab, R-3      simvastatin (ZOCOR) 20 mg tablet TAKE 1 TABLET AT NIGHT, Normal, Disp-90 Tab, R-3      tamsulosin (FLOMAX) 0.4 mg capsule Take 0.4 mg by mouth daily. , Historical Med      celecoxib (CELEBREX) 200 mg capsule Take 1 Cap by mouth as directed. Take 1 tablet once or twice a day as needed for arthritis pain., Normal, Disp-180 Cap, R-3      Blood-Glucose Meter (ONE TOUCH ULTRA SYSTEM KIT) monitoring kit Use as directed daily, Print, Disp-1 Kit, R-0      glucose blood VI test strips (ONE TOUCH ULTRA TEST) strip Monitor BS daily, Print, Disp-1 Package, R-11      Lancets (ONE TOUCH ULTRASOFT LANCETS) Misc Monitor BS daily, Print, Disp-1 Package, R-11      aspirin 81 mg tablet Take 81 mg by mouth.  , Historical Med      finasteride (PROSCAR) 5 mg tablet Take 5 mg by mouth daily. , Historical Med      alfuzosin (UROXATRAL) 10 mg SR tablet Take 10 mg by mouth daily. , Historical Med           2. Follow-up Information     Follow up With Details Comments Contact Info    Paris Kraus MD Schedule an appointment as soon as possible for a visit 2-4 days for recheck Mary  [phone]          Return to ED if worse     12:33 PM  Pt has been reexamined. Pt has no new complaints, changes or physical findings. Care plan outlined and precautions discussed. All available results were reviewed with pt. All medications were reviewed with pt. All of pt's questions and concerns were addressed. Pt agrees to F/U as instructed and agrees to return to ED upon further deterioration. Pt is ready to go home.   JOSE Multani

## 2017-10-24 ENCOUNTER — OFFICE VISIT (OUTPATIENT)
Dept: DERMATOLOGY | Facility: AMBULATORY SURGERY CENTER | Age: 82
End: 2017-10-24

## 2017-10-24 ENCOUNTER — HOSPITAL ENCOUNTER (OUTPATIENT)
Dept: LAB | Age: 82
Discharge: HOME | End: 2017-10-24

## 2017-10-24 VITALS
TEMPERATURE: 97.8 F | HEIGHT: 66 IN | DIASTOLIC BLOOD PRESSURE: 70 MMHG | RESPIRATION RATE: 18 BRPM | WEIGHT: 153 LBS | SYSTOLIC BLOOD PRESSURE: 140 MMHG | OXYGEN SATURATION: 95 % | BODY MASS INDEX: 24.59 KG/M2 | HEART RATE: 79 BPM

## 2017-10-24 VITALS
DIASTOLIC BLOOD PRESSURE: 70 MMHG | HEIGHT: 66 IN | RESPIRATION RATE: 18 BRPM | BODY MASS INDEX: 24.59 KG/M2 | HEART RATE: 79 BPM | OXYGEN SATURATION: 95 % | SYSTOLIC BLOOD PRESSURE: 140 MMHG | TEMPERATURE: 97.8 F | WEIGHT: 153 LBS

## 2017-10-24 DIAGNOSIS — Z85.828 FOLLOW-UP SURVEILLANCE OF SKIN CANCER, ENCOUNTER FOR: ICD-10-CM

## 2017-10-24 DIAGNOSIS — L82.1 SEBORRHEIC KERATOSES: ICD-10-CM

## 2017-10-24 DIAGNOSIS — Z85.828 HISTORY OF NONMELANOMA SKIN CANCER: ICD-10-CM

## 2017-10-24 DIAGNOSIS — C44.319 BASAL CELL CARCINOMA OF LEFT TEMPLE REGION: Primary | ICD-10-CM

## 2017-10-24 DIAGNOSIS — L90.5 SCAR CONDITION AND FIBROSIS OF SKIN: ICD-10-CM

## 2017-10-24 DIAGNOSIS — Z08 FOLLOW-UP SURVEILLANCE OF SKIN CANCER, ENCOUNTER FOR: ICD-10-CM

## 2017-10-24 DIAGNOSIS — D18.01 CHERRY ANGIOMA: ICD-10-CM

## 2017-10-24 DIAGNOSIS — D48.5 NEOPLASM OF UNCERTAIN BEHAVIOR OF SKIN OF TEMPORAL REGION: ICD-10-CM

## 2017-10-24 DIAGNOSIS — L57.0 ACTINIC KERATOSIS: Primary | ICD-10-CM

## 2017-10-24 RX ORDER — BUDESONIDE AND FORMOTEROL FUMARATE DIHYDRATE 80; 4.5 UG/1; UG/1
1 AEROSOL RESPIRATORY (INHALATION) DAILY
COMMUNITY
Start: 2017-05-30

## 2017-10-24 RX ORDER — DICLOFENAC SODIUM 50 MG/1
TABLET, DELAYED RELEASE ORAL
Refills: 0 | COMMUNITY
Start: 2017-08-21 | End: 2022-04-08

## 2017-10-24 RX ORDER — CIPROFLOXACIN AND DEXAMETHASONE 3; 1 MG/ML; MG/ML
SUSPENSION/ DROPS AURICULAR (OTIC)
COMMUNITY
Start: 2017-05-30

## 2017-10-24 RX ORDER — MELOXICAM 15 MG/1
TABLET ORAL
COMMUNITY
Start: 2017-08-02 | End: 2022-04-08

## 2017-10-24 RX ORDER — TIZANIDINE 4 MG/1
4 TABLET ORAL
COMMUNITY
Start: 2017-08-21 | End: 2022-04-08

## 2017-10-24 RX ORDER — SIMVASTATIN 20 MG/1
TABLET, FILM COATED ORAL
COMMUNITY
Start: 2013-08-26 | End: 2022-04-08

## 2017-10-24 RX ORDER — OFLOXACIN 3 MG/ML
SOLUTION/ DROPS OPHTHALMIC
COMMUNITY
Start: 2017-05-15 | End: 2022-04-08

## 2017-10-24 RX ORDER — TRIAMCINOLONE ACETONIDE 40 MG/ML
1 INJECTION, SUSPENSION INTRA-ARTICULAR; INTRAMUSCULAR
COMMUNITY
Start: 2017-10-24 | End: 2017-10-24

## 2017-10-24 RX ORDER — LOSARTAN POTASSIUM AND HYDROCHLOROTHIAZIDE 25; 100 MG/1; MG/1
1 TABLET ORAL
COMMUNITY
Start: 2017-06-07 | End: 2022-04-08

## 2017-10-24 RX ORDER — LIDOCAINE HYDROCHLORIDE 10 MG/ML
10 INJECTION INFILTRATION; PERINEURAL
COMMUNITY
Start: 2017-10-24 | End: 2017-10-24

## 2017-10-24 RX ORDER — ALFUZOSIN HYDROCHLORIDE 10 MG/1
10 TABLET, EXTENDED RELEASE ORAL
COMMUNITY
End: 2017-10-24 | Stop reason: SDUPTHER

## 2017-10-24 RX ORDER — FINASTERIDE 5 MG/1
5 TABLET, FILM COATED ORAL
COMMUNITY
End: 2017-10-24 | Stop reason: SDUPTHER

## 2017-10-24 NOTE — PATIENT INSTRUCTIONS
WOUND CARE INSTRUCTIONS    1. Keep the dressing clean and dry and do not remove for 48 hours. 2. Then change the dressing once a day as follows:  a. Wash hands before and after each dressing change. b. Remove dressing and wash site gently with mild soap and water, rinse, and pat dry.  c. Apply an ointment (Bacitracin, Polysporin, Neosporin, Petroleum jelly or Aquaphor). d. Apply a non-stick (Telfa) dressing or Band-Aid to cover the wound. Remove pressure bandage on thursday, then wash gently and apply a thin layer Vaseline and a band-aid to site daily for 1 week. 3. Watch for:  BLEEDING: A small amount of drainage may occur. If bleeding occurs, elevate and rest the surgery site. Apply gauze and steady pressure for 15 minutes. If bleeding continues, call this office. INFECTION: Signs of infection include increased redness, pain, warmth, drainage of pus, and fever. If this occurs, call this office. 4. Special Instructions (follow any that are checked):  · [x] You have stitches that DO NOT need to be removed. · [x] Avoid bending at the waist and heavy lifting for two days. · [x] Sleep with your head elevated for the next two nights. · [x] Rest the surgery site and keep it elevated as much as possible for two days. · [] You may apply an ice-pack for 10-15 minutes every waking hour for the rest of the day. · [] Eat a soft diet and avoid hot food and hot drinks for the rest of the day. · [] Other instructions: Follow up as directed. Take Tylenol or Ibuprofen for pain as needed. Once the site is healed with no remaining bandages or open areas, protect your surgical site and scar from the sun, as this area will be more sensitive. Use a broad spectrum sunscreen SPF 30 or higher daily, and a chemical free product (one containing zinc oxide or titanium dioxide) is a good choice if the area is sensitive.     You may begin to gently massage the surgical site in 2-3 weeks, rubbing in a circular motion along the scar. This can help reduce swelling and thickness of a scar. A scar cream may be used beginnning 1 month after the surgery. If you have any questions or concerns, please call our office Monday through Friday at [phone].

## 2017-10-24 NOTE — PROGRESS NOTES
Name: Sung Hardy       Age: 80 y.o. Date: 10/24/2017    Chief Complaint:   Chief Complaint   Patient presents with    Skin Exam       Subjective:    HPI  Mr. Sung Hardy is a 80 y.o. male who presents for a full skin exam.  The patient's last skin exam was 6 months ago and the patient does have current complaints related to his skin. He has noted multiple scaly lesions including one on his hand that he is picked off that has returned. He also notes a scaly lesion on the left sideburn and 2 on the left cheek that he shaves ever. He notices very soft scaly lesions on his legs. The patient's pertinent skin history includes : Personal history of multiple nonmelanoma skin cancers as well as actinic keratoses. ROS: Constitutional: Negative. Dermatological : positive for - skin lesion changes      Social History     Social History    Marital status:      Spouse name: N/A    Number of children: N/A    Years of education: N/A     Occupational History    Not on file.      Social History Main Topics    Smoking status: Never Smoker    Smokeless tobacco: Never Used    Alcohol use 0.0 oz/week     1 - 2 Standard drinks or equivalent per week      Comment: occ, moderatley    Drug use: No    Sexual activity: Yes     Partners: Female     Birth control/ protection: None     Other Topics Concern    Not on file     Social History Narrative       Family History   Problem Relation Age of Onset    Heart Disease Mother     Heart Disease Father     Arthritis-rheumatoid Neg Hx        Past Medical History:   Diagnosis Date    Arthritis     knees liliya    Asthma     Empyema of lung ([facility] [address] 2/2013    Hypercholesteremia     Hypertension     Hypertrophy of prostate without urinary obstruction and other lower urinary tract symptoms (LUTS)     Skin cancer     Sun-damaged skin     Type II or unspecified type diabetes mellitus without mention of complication, not stated as uncontrolled 2/27/2013 Past Surgical History:   Procedure Laterality Date    HX APPENDECTOMY      when i was a kid    HX HEENT      cataracts removed bilat.  HX KNEE REPLACEMENT  01/25/14    right knee    HX ORTHOPAEDIC  2007    TKR Left    HX OTHER SURGICAL  2/8/2013    LVATS, pulmonary decortication       Current Outpatient Prescriptions   Medication Sig Dispense Refill    budesonide-formoterol (SYMBICORT) 80-4.5 mcg/actuation HFAA       losartan-hydroCHLOROthiazide (HYZAAR) 100-25 mg per tablet Take 1 Tab by mouth.  simvastatin (ZOCOR) 20 mg tablet TAKE 1 TABLET AT NIGHT      diclofenac EC (VOLTAREN) 50 mg EC tablet TAKE 1 TABLET BY MOUTH BY MOUTH 2 TIMES A DAY. 0    CHOLECALCIFEROL, VITAMIN D3, (VITAMIN D3 PO) Take  by mouth.  niacin ER (NIASPAN) 500 mg tablet Take 1 Tab by mouth daily (after dinner). 90 Tab 3    levofloxacin (LEVAQUIN) 500 mg tablet Take 1 Tab by mouth daily. 10 Tab 0    albuterol (PROVENTIL HFA) 90 mcg/actuation inhaler Take 2 Puffs by inhalation every four (4) hours as needed for Wheezing or Shortness of Breath. 1 Inhaler 1    losartan-hydrochlorothiazide (HYZAAR) 100-25 mg per tablet Take 1 Tab by mouth daily. 90 Tab 3    simvastatin (ZOCOR) 20 mg tablet TAKE 1 TABLET AT NIGHT 90 Tab 3    tamsulosin (FLOMAX) 0.4 mg capsule Take 0.4 mg by mouth daily.  aspirin 81 mg tablet Take 81 mg by mouth.  finasteride (PROSCAR) 5 mg tablet Take 5 mg by mouth daily.  alfuzosin (UROXATRAL) 10 mg SR tablet Take 10 mg by mouth daily.  ciprofloxacin-dexamethasone (CIPRODEX) 0.3-0.1 % otic suspension       lidocaine (XYLOCAINE) 10 mg/mL (1 %) injection 10 mL.  meloxicam (MOBIC) 15 mg tablet TAKE 1 TABLET (15 MG TOTAL) BY MOUTH DAILY.  ofloxacin (FLOXIN) 0.3 % ophthalmic solution       tiZANidine (ZANAFLEX) 4 mg tablet Take 4 mg by mouth.  triamcinolone acetonide (KENALOG) 40 mg/mL injection 1 mL.       bupivacaine-EPINEPHrine (MARCAINE-EPINEPHRINE) 0.25 %-1:200,000 soln Used for mohs surgery 1.5 mL 0    fluoruracil (CARAC) 0.5 % topical cream Apply  to affected area daily. Apply to lesions as directed. Apply to right arm and hand for 1 month, then left arm and hand for 1 month, then front of lower legs for 1 month. 45 g 3    PREDNISONE by Does Not Apply route.  mometasone (NASONEX) 50 mcg/actuation nasal spray 2 Sprays by Both Nostrils route daily. 1 Container 5    HYDROcodone-homatropine (HYCODAN) 5-1.5 mg/5 mL (5 mL) syrup Take 5 mL by mouth three (3) times daily as needed. 150 mL 0    celecoxib (CELEBREX) 200 mg capsule Take 1 Cap by mouth as directed. Take 1 tablet once or twice a day as needed for arthritis pain. 180 Cap 3    Blood-Glucose Meter (ONE TOUCH ULTRA SYSTEM KIT) monitoring kit Use as directed daily 1 Kit 0    glucose blood VI test strips (ONE TOUCH ULTRA TEST) strip Monitor BS daily 1 Package 11    Lancets (ONE TOUCH ULTRASOFT LANCETS) Misc Monitor BS daily 1 Package 11       Allergies   Allergen Reactions    Pcn [Penicillins] Swelling         Objective:    Visit Vitals    /70 (BP 1 Location: Right arm, BP Patient Position: Sitting)    Pulse 79    Temp 97.8 °F (36.6 °C) (Oral)    Resp 18    Ht 5' 6\" (1.676 m)    Wt 69.4 kg (153 lb)    SpO2 95%    BMI 24.69 kg/m2       Eduardo Contreras is a 80 y.o. male who appears well and in no distress. He is awake, alert, and oriented. There is no preauricular, submandibular, or cervical lymphadenopathy. A skin examination was performed including his scalp, face (including eyelids), ears, neck, chest, back, abdomen, upper extremities (including digits/nails), lower extremities, breasts, buttocks; genital skin was not examined. He has been scattered actinic keratosis noted on the forearms, hands, left temple, left cheek ×2 medially and left cheek ×1 laterally, lower extremities. There is a larger lesion on the left posterior ankle, irritated and dry SK vs SCCi.   On the left temple there is a 5 x 4 mm shiny papule with telangiectasias concerning for basal cell carcinoma. His multiple well-healed scars are without evidence of lesion recurrence. There are scattered stuck on waxy macules and keratotic papules consistent with seborrheic keratoses. He has scattered cherry angiomas. Assessment/Plan: 1. Actinic Keratoses. The diagnosis of this precancerous lesion related to sun exposure was reviewed. Verbal consent was obtained. I treated 10 lesions with cryotherapy and post-cryotherapy care was reviewed. 2. Neoplasm of Uncertain Behavior, left temple. The differential diagnoses were discussed. I anesthetized the area after consent signed and Dr. Zachary Mora performed the excision. Please see her note. 3.Personal history of skin cancer. I discussed sun protection, sunscreen use, the warning signs of skin cancer, the need for self-skin examinations, and the need for regular practitioner exams every 6 months. The patient should follow up sooner as needed if new, changing, or symptomatic skin lesions arise. 4. Lesion left posterior ankle. He will use the 5-Fu he has for a total of 4 weeks. 5. Seborrheic keratoses. The diagnosis was reviewed and the patient was reassured that no treatment is needed for these benign lesions. [facility]   OFFICE PROCEDURE PROGRESS NOTE   Chart reviewed for the following:   Hood WATSON, have reviewed the History, Physical and updated the Allergic reactions for [place]. TIME OUT performed immediately prior to start of procedure:   Josette WATSON Reasons, have performed the following reviews on [place]   prior to the start of the procedure:     * Patient was identified by name and date of birth   * Agreement on procedure being performed was verified   * Risks and Benefits explained to the patient   * Procedure site verified and marked as necessary   * Patient was positioned for comfort   * Consent was signed and verified     Time: 1500  Date of procedure: 10/24/2017  Procedure performed by: Era Wong.  Merna Horan DNP  Provider assisted by: Raven Lara   Patient assisted by: self   How tolerated by patient: tolerated the procedure well with no complications   Comments: none

## 2017-10-24 NOTE — MR AVS SNAPSHOT
Visit Information Date & Time Provider Department Dept. Phone Encounter #  
 10/24/2017  2:30 PM ASUNCION Akbar [id number] Your Appointments 4/17/2018 10:30 AM  
Office Visit with ASUNCION Akbar [id number] [facility]) Appt Note: 6 Months Skin Exam  
 [facility] [id number]  
[address] [place] [id number] Upcoming Health Maintenance Date Due DTaP/Tdap/Td series (1 - Tdap) [birth year]/26/1954 ZOSTER VACCINE AGE 60> 1/26/1993 GLAUCOMA SCREENING Q2Y 3/26/1998 Pneumococcal 65+ Low/Medium Risk (1 of 2 - PCV13) 3/26/1998 MEDICARE YEARLY EXAM 3/26/1998 FOOT EXAM Q1 5/31/2014 MICROALBUMIN Q1 5/31/2014 LIPID PANEL Q1 5/31/2014 HEMOGLOBIN A1C Q6M 7/15/2014 EYE EXAM RETINAL OR DILATED Q1 12/18/2014 INFLUENZA AGE 9 TO ADULT 8/1/2017 Allergies as of 10/24/2017  Review Complete On: 10/24/2017 By: Destiny Jeffers Severity Noted Reaction Type Reactions Pcn [Penicillins]  10/26/2009    Swelling Current Immunizations  Reviewed on 2/12/2013 Name Date Influenza Vaccine Split 10/10/2012 Not reviewed this visit Vitals BP Pulse Temp Resp Height(growth percentile) Weight(growth percentile) 140/70 (BP 1 Location: Right arm, BP Patient Position: Sitting) 79 97.8 °F (36.6 °C) (Oral) 18 5' 6\" (1.676 m) 153 lb (69.4 kg) SpO2 BMI Smoking Status 95% 24.69 kg/m2 Never Smoker BMI and BSA Data Body Mass Index Body Surface Area  
 24.69 kg/m 2 1.8 m 2 Preferred Pharmacy Pharmacy Name Phone [facility], [address] [place] [address] [address] Your Updated Medication List  
  
   
This list is accurate as of: 10/24/17  2:42 PM.  Always use your most recent med list.  
  
  
  
  
 albuterol 90 mcg/actuation inhaler Commonly known as:  PROVENTIL HFA Take 2 Puffs by inhalation every four (4) hours as needed for Wheezing or Shortness of Breath. aspirin 81 mg tablet Take 81 mg by mouth. Blood-Glucose Meter monitoring kit Commonly known as:  [address]   [address] KIT Use as directed daily  
  
 bupivacaine-EPINEPHrine 0.25 %-1:200,000 Soln Commonly known as:  [place] Used for mohs surgery  
  
 celecoxib 200 mg capsule Commonly known as:  CeleBREX Take 1 Cap by mouth as directed. Take 1 tablet once or twice a day as needed for arthritis pain. CIPRODEX 0.3-0.1 % otic suspension Generic drug:  ciprofloxacin-dexamethasone  
  
 diclofenac EC 50 mg EC tablet Commonly known as:  VOLTAREN  
TAKE 1 TABLET BY MOUTH BY MOUTH 2 TIMES A DAY. finasteride 5 mg tablet Commonly known as:  PROSCAR Take 5 mg by mouth daily. FLOMAX 0.4 mg capsule Generic drug:  tamsulosin Take 0.4 mg by mouth daily. fluoruracil 0.5 % topical cream  
Commonly known as:  Kate Brandie Apply  to affected area daily. Apply to lesions as directed. Apply to right arm and hand for 1 month, then left arm and hand for 1 month, then front of lower legs for 1 month. glucose blood VI test strips strip Commonly known as:  ONETOUCH ULTRA TEST Monitor BS daily HYDROcodone-homatropine 5-1.5 mg/5 mL (5 mL) syrup Commonly known as:  HYCODAN (WITH HOMATROPIN) Take 5 mL by mouth three (3) times daily as needed. KENALOG 40 mg/mL injection Generic drug:  triamcinolone acetonide  
1 mL. Lancets Misc Commonly known as:  ONETOUCH ULTRASOFT LANCETS Monitor BS daily  
  
 levoFLOXacin 500 mg tablet Commonly known as:  Hammadas Marc Take 1 Tab by mouth daily. lidocaine 10 mg/mL (1 %) injection Commonly known as:  XYLOCAINE 10 mL. * losartan-hydroCHLOROthiazide 100-25 mg per tablet Commonly known as:  HYZAAR  
 Take 1 Tab by mouth daily. * losartan-hydroCHLOROthiazide 100-25 mg per tablet Commonly known as:  HYZAAR Take 1 Tab by mouth.  
  
 meloxicam 15 mg tablet Commonly known as:  MOBIC  
TAKE 1 TABLET (15 MG TOTAL) BY MOUTH DAILY. mometasone 50 mcg/actuation nasal spray Commonly known as:  NASONEX  
2 Sprays by Both Nostrils route daily. niacin  mg tablet Commonly known as:  Lajune Drape Take 1 Tab by mouth daily (after dinner). ofloxacin 0.3 % ophthalmic solution Commonly known as:  FLOXIN  
  
 PREDNISONE  
by Does Not Apply route. * simvastatin 20 mg tablet Commonly known as:  ZOCOR  
TAKE 1 TABLET AT NIGHT * simvastatin 20 mg tablet Commonly known as:  ZOCOR  
TAKE 1 TABLET AT NIGHT  
  
 SYMBICORT 80-4.5 mcg/actuation [facility] Generic drug:  budesonide-formoterol  
  
 tiZANidine 4 mg tablet Commonly known as:  Kip Schirmer Take 4 mg by mouth. UROXATRAL 10 mg SR tablet Generic drug:  alfuzosin SR Take 10 mg by mouth daily. VITAMIN D3 PO Take  by mouth. * Notice: This list has 4 medication(s) that are the same as other medications prescribed for you. Read the directions carefully, and ask your doctor or other care provider to review them with you. To-Do List   
 01/04/2018 9:00 AM  
(Arrive by 8:45 AM) Appointment with [address] [place] [address] at [address] ([phone]) NON-CONTRAST STUDY: 1. Bring any non [facility] facility films/images pertaining to the area of interest with you on the day of appointment. 2. Check in at registration at least 30 minutes before appt time unless you were instructed to do otherwise. 3. If you have to drink oral contrast please pick it up any weekday prior to your appointment, if you cannot please check in 2 hrs  before appt time. Please arrive 15 minutes prior to appointment to register Patient Instructions Self Skin Exam and Sunscreens Early detection and treatment is essential in the treatment of all forms of skin cancer. If caught early, all forms of skin cancer are curable. In addition to your regular visits, you should perform a monthly skin examination. Over time, you become familiar with what is normally found on your skin and can identify new or suspicious spots. One of the screening tools you can use to assess your skin is to follow the ABCDEs: 
 
A= Asymmetry (One half is unlike the other half) B= Border (An irregular, scalloped or poorly defined edge) C= Color (Is varied from one area to another, has shades of tan, brown/ black,       white, red or blue) D= Diameter (Spots larger than 6mm or a pencil eraser) E= Evolving (New spots or one that is changing in size, shape, or color) A follow- up interval will be customized based on your history of skin cancer or level of skin damage and risk factors. In any case, if you notice a suspicious or new spot, an appointment should be arranged between regular visits. Everyone should use sunscreen and sun-safe practices, which is especially important for those with a personal or family history of skin cancer. Suggestions for this include: 1. Use daily moisturizers containing SPF 30 or higher. 2. Wear long sleeve clothing with UPF ratings and a broad-brimmed hat. 3. Apply sunscreen with SPF 30 or higher to all sun exposed areas if you are going to be in the sun. A broad spectrum UVA/ UVB sunscreen is best.  Dont forget to REAPPLY every two hours or more often if swimming or sweating! 4. Avoid outside activities during peak sun hours, especially in the summer (10am- 2pm). 5. DO NOT use tanning beds. Using sunscreen and sun-safe practices can help reduce the likelihood of developing skin cancer or additional skin cancers in those previously diagnosed. Introducing [facility]! Dear Nedra Valenzuela: Thank you for requesting a [employer] account. Our records indicate that you already have an active Chat[employer] account. You can access your account anytime at [email or web address] Did you know that you can access your hospital and ER discharge instructions at any time in [employer]? You can also review all of your test results from your hospital stay or ER visit. Additional Information If you have questions, please visit the Frequently Asked Questions section of the [employer] website at [email or web address] Remember, Chat[employer] is NOT to be used for urgent needs. For medical emergencies, dial 911. Now available from your iPhone and Android! Please provide this summary of care documentation to your next provider. Your primary care clinician is listed as Lizzeth Carr. If you have any questions after today's visit, please call [phone].

## 2017-10-24 NOTE — MR AVS SNAPSHOT
Visit Information Date & Time Provider Department Dept. Phone Encounter #  
 10/24/2017  3:00 PM MD Roxanna Ochoa57 [phone] Your Appointments 4/17/2018 10:30 AM  
Office Visit with ASUNCION Andre [facility]) Appt Note: 6 Months Skin Exam  
 [facility] A [facility] [id number]  
[facility] [address] [address] Upcoming Health Maintenance Date Due DTaP/Tdap/Td series (1 - Tdap) 3/26/1954 ZOSTER VACCINE AGE 60> 1/26/1993 GLAUCOMA SCREENING Q2Y 3/26/1998 Pneumococcal 65+ Low/Medium Risk (1 of 2 - PCV13) 3/26/1998 MEDICARE YEARLY EXAM 3/26/1998 FOOT EXAM Q1 5/31/2014 MICROALBUMIN Q1 5/31/2014 LIPID PANEL Q1 5/31/2014 HEMOGLOBIN A1C Q6M 7/15/2014 EYE EXAM RETINAL OR DILATED Q1 12/18/2014 INFLUENZA AGE 9 TO ADULT 8/1/2017 Allergies as of 10/24/2017  Review Complete On: 10/24/2017 By: Arleen Jiménez LPN Severity Noted Reaction Type Reactions Pcn [Penicillins]  10/26/2009    Swelling Current Immunizations  Reviewed on 2/12/2013 Name Date Influenza Vaccine Split 10/10/2012 Not reviewed this visit Vitals BP Pulse Temp Resp Height(growth percentile) Weight(growth percentile) 140/70 79 97.8 °F (36.6 °C) 18 5' 6\" (1.676 m) 153 lb (69.4 kg) SpO2 BMI Smoking Status 95% 24.69 kg/m2 Never Smoker BMI and BSA Data Body Mass Index Body Surface Area  
 24.69 kg/m 2 1.8 m 2 Preferred Pharmacy Pharmacy Name Phone 305 [facility], [address] [facility] [address] Your Updated Medication List  
  
   
This list is accurate as of: 10/24/17  3:03 PM.  Always use your most recent med list.  
  
  
  
  
 albuterol 90 mcg/actuation inhaler Commonly known as:  PROVENTIL HFA  
 Take 2 Puffs by inhalation every four (4) hours as needed for Wheezing or Shortness of Breath. aspirin 81 mg tablet Take 81 mg by mouth. Blood-Glucose Meter monitoring kit Commonly known as:  [address]   [address] KIT Use as directed daily  
  
 bupivacaine-EPINEPHrine 0.25 %-1:200,000 Soln Commonly known as:  Morna Lindon Used for mohs surgery  
  
 celecoxib 200 mg capsule Commonly known as:  CeleBREX Take 1 Cap by mouth as directed. Take 1 tablet once or twice a day as needed for arthritis pain. CIPRODEX 0.3-0.1 % otic suspension Generic drug:  ciprofloxacin-dexamethasone  
  
 diclofenac EC 50 mg EC tablet Commonly known as:  VOLTAREN  
TAKE 1 TABLET BY MOUTH BY MOUTH 2 TIMES A DAY. finasteride 5 mg tablet Commonly known as:  PROSCAR Take 5 mg by mouth daily. FLOMAX 0.4 mg capsule Generic drug:  tamsulosin Take 0.4 mg by mouth daily. fluoruracil 0.5 % topical cream  
Commonly known as:  Davida Savant Apply  to affected area daily. Apply to lesions as directed. Apply to right arm and hand for 1 month, then left arm and hand for 1 month, then front of lower legs for 1 month. glucose blood VI test strips strip Commonly known as:  ONETOUCH ULTRA TEST Monitor BS daily HYDROcodone-homatropine 5-1.5 mg/5 mL (5 mL) syrup Commonly known as:  HYCODAN (WITH HOMATROPIN) Take 5 mL by mouth three (3) times daily as needed. KENALOG 40 mg/mL injection Generic drug:  triamcinolone acetonide  
1 mL. Lancets Misc Commonly known as:  ONETOUCH ULTRASOFT LANCETS Monitor BS daily  
  
 levoFLOXacin 500 mg tablet Commonly known as:  Rafia Rakes Take 1 Tab by mouth daily. lidocaine 10 mg/mL (1 %) injection Commonly known as:  XYLOCAINE 10 mL. * losartan-hydroCHLOROthiazide 100-25 mg per tablet Commonly known as:  HYZAAR Take 1 Tab by mouth daily. * losartan-hydroCHLOROthiazide 100-25 mg per tablet Commonly known as:  HYZAAR Take 1 Tab by mouth.  
  
 meloxicam 15 mg tablet Commonly known as:  MOBIC  
TAKE 1 TABLET (15 MG TOTAL) BY MOUTH DAILY. mometasone 50 mcg/actuation nasal spray Commonly known as:  NASONEX  
2 Sprays by Both Nostrils route daily. niacin  mg tablet Commonly known as:  Charles Elms Take 1 Tab by mouth daily (after dinner). ofloxacin 0.3 % ophthalmic solution Commonly known as:  FLOXIN  
  
 PREDNISONE  
by Does Not Apply route. * simvastatin 20 mg tablet Commonly known as:  ZOCOR  
TAKE 1 TABLET AT NIGHT * simvastatin 20 mg tablet Commonly known as:  ZOCOR  
TAKE 1 TABLET AT NIGHT  
  
 SYMBICORT 80-4.5 mcg/actuation Hfaa Generic drug:  budesonide-formoterol  
  
 tiZANidine 4 mg tablet Commonly known as:  [place] Take 4 mg by mouth. UROXATRAL 10 mg SR tablet Generic drug:  alfuzosin SR Take 10 mg by mouth daily. VITAMIN D3 PO Take  by mouth. * Notice: This list has 4 medication(s) that are the same as other medications prescribed for you. Read the directions carefully, and ask your doctor or other care provider to review them with you. To-Do List   
 01/04/2018 9:00 AM  
(Arrive by 8:45 AM) Appointment with [address] [place] [address] at [address] ([phone]) NON-CONTRAST STUDY: 1. Bring any non [facility] facility films/images pertaining to the area of interest with you on the day of appointment. 2. Check in at registration at least 30 minutes before appt time unless you were instructed to do otherwise. 3. If you have to drink oral contrast please pick it up any weekday prior to your appointment, if you cannot please check in 2 hrs  before appt time. Please arrive 15 minutes prior to appointment to register Patient Instructions WOUND CARE INSTRUCTIONS 1. Keep the dressing clean and dry and do not remove for 48 hours. 2. Then change the dressing once a day as follows: 
a. Wash hands before and after each dressing change. b. Remove dressing and wash site gently with mild soap and water, rinse, and pat dry. 
c. Apply an ointment (Bacitracin, Polysporin, Neosporin, Petroleum jelly or Aquaphor). d. Apply a non-stick (Telfa) dressing or Band-Aid to cover the wound. Remove pressure bandage on thursday, then wash gently and apply a thin layer Vaseline and a band-aid to site daily for 1 week. 3. Watch for: BLEEDING: A small amount of drainage may occur. If bleeding occurs, elevate and rest the surgery site. Apply gauze and steady pressure for 15 minutes. If bleeding continues, call this office. INFECTION: Signs of infection include increased redness, pain, warmth, drainage of pus, and fever. If this occurs, call this office. 4. Special Instructions (follow any that are checked): ·  You have stitches that DO NOT need to be removed. ·  Avoid bending at the waist and heavy lifting for two days. ·  Sleep with your head elevated for the next two nights. ·  Rest the surgery site and keep it elevated as much as possible for two days. ·  You may apply an ice-pack for 10-15 minutes every waking hour for the rest of the day. ·  Eat a soft diet and avoid hot food and hot drinks for the rest of the day. ·  Other instructions: Follow up as directed. Take Tylenol or Ibuprofen for pain as needed. Once the site is healed with no remaining bandages or open areas, protect your surgical site and scar from the sun, as this area will be more sensitive. Use a broad spectrum sunscreen SPF 30 or higher daily, and a chemical free product (one containing zinc oxide or titanium dioxide) is a good choice if the area is sensitive. You may begin to gently massage the surgical site in 2-3 weeks, rubbing in a circular motion along the scar.  This can help reduce swelling and thickness of a scar. A scar cream may be used beginnning 1 month after the surgery. If you have any questions or concerns, please call our office Monday through Friday at [phone]. Introducing [facility] & HEALTH SERVICES! Dear Vicente Cheema: Thank you for requesting a Marketocracy account. Our records indicate that you already have an active Marketocracy account. You can access your account anytime at [email or web address] Did you know that you can access your hospital and ER discharge instructions at any time in Marketocracy? You can also review all of your test results from your hospital stay or ER visit. Additional Information If you have questions, please visit the Frequently Asked Questions section of the Marketocracy website at [email or web address] Remember, Marketocracy is NOT to be used for urgent needs. For medical emergencies, dial 911. Now available from your iPhone and Android! Please provide this summary of care documentation to your next provider. Your primary care clinician is listed as Hailey Spencer. If you have any questions after today's visit, please call [phone].

## 2017-10-24 NOTE — PROGRESS NOTES
Chief Complaint   Patient presents with    Skin Exam     1. Have you been to the ER, urgent care clinic since your last visit? Hospitalized since your last visit? Yes, [facility] ER for chest pains 3 months ago. 2. Have you seen or consulted any other health care providers outside of the [address] [place] [address] since your last visit? Include any pap smears or colon screening.  No

## 2017-10-24 NOTE — PROGRESS NOTES
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 Procedure explained with full understanding. There were no vitals filed for this visit. preoperatively, will continue to monitor. Post-op: Written and verbal post-op wound care instructions given to patient with full understanding of care. Surgical wound bandaged with Vaseline, Telfa, 2x2 gauze, and coverall tape. All questions and concerns addressed. Vitals stable postoperatively.

## 2017-10-24 NOTE — PROGRESS NOTES
Written by Laurel Ball, as dictated by Dr. Tawnya France. Albino Washington MD.    CC: Neoplasm of uncertain behavior on the left temple, favor basal cell carcinoma     History of Present Illness:    Lou Almazan is a 80 y.o. male referred by Pema Lopez DNP. He has a neoplasm of uncertain behavior on the left temple, favor basal cell carcinoma. This is a new neoplasm present for less than six months described as a small lesion noticed by Pema Lopez DNP with no prior treatment. He is feeling well and in his usual state of health today. He has no pain, no current illnesses, no other skin concerns. His allergies, medications, medical, and social history are reviewed by me today. Exam:    He is an awake, alert, and oriented 80 y.o. male who appears well and in no distress.  I examined his left temple. He has a 5 x 4 mm shiny papule with telangiectasias on his left temple. He confirms the location. Assessment/Plan:    1. Neoplasm of uncertain behavior, left temple. Excision was advised for removal and therapy of this 5 x 4 mm lesion on the left temple.  The excision procedure was reviewed and verbal and written consents were obtained.  The risks of pain, bleeding, infection, recurrence of the lesion, and scar were discussed.  I performed the procedure.  The site was cleansed and anesthetized with 1% lidocaine with epinephrine 1:100,000.  The lesion was excised with a 2 mm margin in an elliptical manner to a depth of subcutaneous tissue.  An intermediate was performed after the excision. 6-0 polysorb suture was used for approximation of deep tissues and a second layer of 6-0 polysorb suture was used to approximate the skin edges. The closure length was 28 mm.  The wound was bandaged and care reviewed.  The specimen was sent to pathology. I will contact the patient with the results and any further treatment that may be necessary.      The documentation recorded by the scribe accurately reflects the service I personally performed and the decisions made by me. [facility]   OFFICE PROCEDURE PROGRESS NOTE     Chart reviewed for the following:     Nazia Sandoval MD, have reviewed the History, Physical and updated the Allergic reactions for [address] performed immediately prior to start of procedure:     Nazia Sandoval MD, have performed the following reviews on Vanesa Miller prior to the start of the procedure:     * Patient was identified by name and date of birth   * Agreement on procedure being performed was verified   * Risks and Benefits explained to the patient   * Procedure site verified and marked as necessary   * Patient was positioned for comfort   * Consent was signed and verified     Time: 3:01 PM  Date of procedure: 10/24/2017  Procedure performed by: Christina Garcia.  Teresa Sandoval MD   Provider assisted by: LPN   Patient assisted by: self   How tolerated by patient: tolerated the procedure well with no complications   Comments: none

## 2017-10-30 NOTE — PROGRESS NOTES
I called his home # on 10/39 and left a message with the report of excised BCC, call back wih any questions.

## 2018-01-04 ENCOUNTER — HOSPITAL ENCOUNTER (OUTPATIENT)
Dept: CT IMAGING | Age: 83
Discharge: HOME | End: 2018-01-04
Attending: INTERNAL MEDICINE
Payer: MEDICARE

## 2018-01-04 DIAGNOSIS — J47.9 BRONCHIECTASIS (HCC): ICD-10-CM

## 2018-01-04 PROCEDURE — 71250 CT THORAX DX C-: CPT

## 2018-01-18 ENCOUNTER — OFFICE VISIT (OUTPATIENT)
Dept: DERMATOLOGY | Facility: AMBULATORY SURGERY CENTER | Age: 83
End: 2018-01-18

## 2018-01-18 ENCOUNTER — HOSPITAL ENCOUNTER (OUTPATIENT)
Dept: LAB | Age: 83
Discharge: HOME | End: 2018-01-18

## 2018-01-18 VITALS
DIASTOLIC BLOOD PRESSURE: 82 MMHG | HEIGHT: 66 IN | OXYGEN SATURATION: 95 % | SYSTOLIC BLOOD PRESSURE: 158 MMHG | WEIGHT: 153 LBS | BODY MASS INDEX: 24.59 KG/M2 | TEMPERATURE: 97.9 F | RESPIRATION RATE: 20 BRPM | HEART RATE: 81 BPM

## 2018-01-18 DIAGNOSIS — D48.5 NEOPLASM OF UNCERTAIN BEHAVIOR OF SKIN OF THIGH: Primary | ICD-10-CM

## 2018-01-18 RX ORDER — DICLOFENAC SODIUM 50 MG/1
TABLET, DELAYED RELEASE ORAL
COMMUNITY
Start: 2017-11-01 | End: 2022-04-08

## 2018-01-18 RX ORDER — PREDNISONE 10 MG/1
TABLET ORAL
Refills: 0 | COMMUNITY
Start: 2017-12-27 | End: 2022-04-08

## 2018-01-18 RX ORDER — CLINDAMYCIN HYDROCHLORIDE 300 MG/1
CAPSULE ORAL
Refills: 0 | COMMUNITY
Start: 2017-12-27 | End: 2022-04-08

## 2018-01-18 NOTE — MR AVS SNAPSHOT
[address] within [facility] [address] [place] [address] [address] 
[phone] Patient: Bull Rizzo MRN: [id number] C:[birth year] Visit Information Date & Time Provider Department Dept. Phone Encounter #  
 1/18/2018 10:30 AM ASUNCION Juárez [id number] [phone] [id number] Your Appointments 1/18/2018 10:30 AM  
Office Visit with ASUNCION Juárez [address] [facility]) Appt Note: skin exam spot on left leg  
 [facility] [id number]  
[address] [facility] [address] [place] [id number] 4/17/2018 10:30 AM  
Office Visit with ASUNCION Juárez 8[address] [facility]) Appt Note: 6 Months Skin Exam  
 [facility] A [address]  
[phone] Upcoming Health Maintenance Date Due DTaP/Tdap/Td series (1 - Tdap) 3[birth year]26/1954 ZOSTER VACCINE AGE 60> 1/26/1993 GLAUCOMA SCREENING Q2Y 3/26/1998 Pneumococcal 65+ Low/Medium Risk (1 of 2 - PCV13) 3/26/1998 MEDICARE YEARLY EXAM 3/26/1998 FOOT EXAM Q1 5/31/2014 MICROALBUMIN Q1 5/31/2014 LIPID PANEL Q1 5/31/2014 HEMOGLOBIN A1C Q6M 7/15/2014 EYE EXAM RETINAL OR DILATED Q1 12/18/2014 Allergies as of 1/18/2018  Review Complete On: 1/18/2018 By: Aranza Mendez LPN Severity Noted Reaction Type Reactions Pcn [Penicillins]  10/26/2009    Swelling Current Immunizations  Reviewed on 2/12/2013 Name Date Influenza Vaccine Split 10/10/2012 Not reviewed this visit Vitals BP Pulse Temp Resp Height(growth percentile) Weight(growth percentile) 158/82 (BP 1 Location: Left arm, BP Patient Position: Sitting) 81 97.9 °F (36.6 °C) (Oral) 20 5' 6\" (1.676 m) 153 lb (69.4 kg) SpO2 BMI Smoking Status 95% 24.69 kg/m2 Never Smoker BMI and BSA Data Body Mass Index Body Surface Area  
 24.69 kg/m 2 1.8 m 2 Preferred Pharmacy Pharmacy Name Phone 305 [facility], [address] [address] Felix Bland Your Updated Medication List  
  
   
This list is accurate as of: 1/18/18 10:12 AM.  Always use your most recent med list.  
  
  
  
  
 albuterol 90 mcg/actuation inhaler Commonly known as:  PROVENTIL HFA Take 2 Puffs by inhalation every four (4) hours as needed for Wheezing or Shortness of Breath. aspirin 81 mg tablet Take 81 mg by mouth. Blood-Glucose Meter monitoring kit Commonly known as:  [address]   [address] KIT Use as directed daily  
  
 bupivacaine-EPINEPHrine 0.25 %-1:200,000 Soln Commonly known as:  Windy Bold Used for mohs surgery  
  
 celecoxib 200 mg capsule Commonly known as:  CeleBREX Take 1 Cap by mouth as directed. Take 1 tablet once or twice a day as needed for arthritis pain. CIPRODEX 0.3-0.1 % otic suspension Generic drug:  ciprofloxacin-dexamethasone  
  
 clindamycin 300 mg capsule Commonly known as:  CLEOCIN  
TAKE 1 CAPSULE THREE TIMES DAILY  
  
 * diclofenac EC 50 mg EC tablet Commonly known as:  VOLTAREN  
TAKE 1 TABLET BY MOUTH BY MOUTH 2 TIMES A DAY. * diclofenac EC 50 mg EC tablet Commonly known as:  VOLTAREN  
TAKE 1 TABLET BY MOUTH BY MOUTH 2 TIMES A DAY. finasteride 5 mg tablet Commonly known as:  PROSCAR Take 5 mg by mouth daily. FLOMAX 0.4 mg capsule Generic drug:  tamsulosin Take 0.4 mg by mouth daily. fluoruracil 0.5 % topical cream  
Commonly known as:  Salem Alpers Apply  to affected area daily. Apply to lesions as directed. Apply to right arm and hand for 1 month, then left arm and hand for 1 month, then front of lower legs for 1 month. FLUZONE HIGH-DOSE 2017-18 (PF) Syrg injection Generic drug:  influenza vaccine 2017-18 (65 yrs+)(PF)  
TO BE ADMINISTERED BY PHARMACIST FOR IMMUNIZATION  
  
 glucose blood VI test strips strip Commonly known as:  ONETOUCH ULTRA TEST Monitor BS daily HYDROcodone-homatropine 5-1.5 mg/5 mL (5 mL) syrup Commonly known as:  HYCODAN (WITH HOMATROPIN) Take 5 mL by mouth three (3) times daily as needed. Lancets Misc Commonly known as:  ONETOUCH ULTRASOFT LANCETS Monitor BS daily  
  
 levoFLOXacin 500 mg tablet Commonly known as:  Cherylann Salines Take 1 Tab by mouth daily. * losartan-hydroCHLOROthiazide 100-25 mg per tablet Commonly known as:  HYZAAR Take 1 Tab by mouth daily. * losartan-hydroCHLOROthiazide 100-25 mg per tablet Commonly known as:  HYZAAR Take 1 Tab by mouth.  
  
 meloxicam 15 mg tablet Commonly known as:  MOBIC  
TAKE 1 TABLET (15 MG TOTAL) BY MOUTH DAILY. mometasone 50 mcg/actuation nasal spray Commonly known as:  NASONEX  
2 Sprays by Both Nostrils route daily. niacin  mg tablet Commonly known as:  Vani Furnas Take 1 Tab by mouth daily (after dinner). ofloxacin 0.3 % ophthalmic solution Commonly known as:  FLOXIN  
  
 * PREDNISONE  
by Does Not Apply route. * predniSONE 10 mg dose pack Commonly known as:  STERAPRED DS  
TAKE AS DIRECTED ON PACKAGE * simvastatin 20 mg tablet Commonly known as:  ZOCOR  
TAKE 1 TABLET AT NIGHT * simvastatin 20 mg tablet Commonly known as:  ZOCOR  
TAKE 1 TABLET AT NIGHT  
  
 SYMBICORT 80-4.5 mcg/actuation Hfaa Generic drug:  budesonide-formoterol  
  
 tiZANidine 4 mg tablet Commonly known as:  Aung Coupe Take 4 mg by mouth. UROXATRAL 10 mg SR tablet Generic drug:  alfuzosin SR Take 10 mg by mouth daily. VITAMIN D3 PO Take  by mouth. * Notice: This list has 8 medication(s) that are the same as other medications prescribed for you.  Read the directions carefully, and ask your doctor or other care provider to review them with you. Patient Instructions Self Skin Exam and Sunscreens Early detection and treatment is essential in the treatment of all forms of skin cancer. If caught early, all forms of skin cancer are curable. In addition to your regular visits, you should perform a monthly skin examination. Over time, you become familiar with what is normally found on your skin and can identify new or suspicious spots. One of the screening tools you can use to assess your skin is to follow the ABCDEs: 
 
A= Asymmetry (One half is unlike the other half) B= Border (An irregular, scalloped or poorly defined edge) C= Color (Is varied from one area to another, has shades of tan, brown/ black,       white, red or blue) D= Diameter (Spots larger than 6mm or a pencil eraser) E= Evolving (New spots or one that is changing in size, shape, or color) A follow- up interval will be customized based on your history of skin cancer or level of skin damage and risk factors. In any case, if you notice a suspicious or new spot, an appointment should be arranged between regular visits. Everyone should use sunscreen and sun-safe practices, which is especially important for those with a personal or family history of skin cancer. Suggestions for this include: 1. Use daily moisturizers containing SPF 30 or higher. 2. Wear long sleeve clothing with UPF ratings and a broad-brimmed hat. 3. Apply sunscreen with SPF 30 or higher to all sun exposed areas if you are going to be in the sun. A broad spectrum UVA/ UVB sunscreen is best.  Dont forget to REAPPLY every two hours or more often if swimming or sweating! 4. Avoid outside activities during peak sun hours, especially in the summer (10am- 2pm). 5. DO NOT use tanning beds.  
 
Using sunscreen and sun-safe practices can help reduce the likelihood of developing skin cancer or additional skin cancers in those previously diagnosed. Introducing hospitals & HEALTH SERVICES! Dear Berto Peña: Thank you for requesting a tagUin account. Our records indicate that you already have an active tagUin account. You can access your account anytime at [email or web address] Did you know that you can access your hospital and ER discharge instructions at any time in tagUin? You can also review all of your test results from your hospital stay or ER visit. Additional Information If you have questions, please visit the Frequently Asked Questions section of the tagUin website at [email or web address] Remember, tagUin is NOT to be used for urgent needs. For medical emergencies, dial 911. Now available from your iPhone and Android! Please provide this summary of care documentation to your next provider. Your primary care clinician is listed as Wanda Varela. If you have any questions after today's visit, please call [phone].

## 2018-01-18 NOTE — PROGRESS NOTES
Chief Complaint   Patient presents with    Skin Exam     spot on left leg     1. Have you been to the ER, urgent care clinic since your last visit? Hospitalized since your last visit? No    2. Have you seen or consulted any other health care providers outside of the [address] [place] [address] since your last visit? Include any pap smears or colon screening.  Yes Where: ENT

## 2018-01-18 NOTE — PROGRESS NOTES
Written by Teena Cardenas, as dictated by Kareen Holland, [address]. Name: Eloina Rollins       Age: 80 y.o. Date: 1/18/2018    Chief Complaint: No chief complaint on file. Subjective:    HPI:  Mr.. Eloina Rollins is a 80 y.o. male who presents for the evaluation of a lesion on the left anterior thigh. He states that the lesion appeared 1 month ago. The patient has not had prior treatment for this lesion. Associated symptoms include pain and growth. ROS: Consitutional: Negative  Dermatological : positive for - skin lesion changes    Social History     Social History    Marital status:      Spouse name: N/A    Number of children: N/A    Years of education: N/A     Occupational History    Not on file. Social History Main Topics    Smoking status: Never Smoker    Smokeless tobacco: Never Used    Alcohol use 0.0 oz/week     1 - 2 Standard drinks or equivalent per week      Comment: occ, moderatley    Drug use: No    Sexual activity: Yes     Partners: Female     Birth control/ protection: None     Other Topics Concern    Not on file     Social History Narrative       Family History   Problem Relation Age of Onset    Heart Disease Mother     Heart Disease Father     Arthritis-rheumatoid Neg Hx        Past Medical History:   Diagnosis Date    Arthritis     knees liliya    Asthma     Empyema of lung ([facility] [address] 2/2013    Hypercholesteremia     Hypertension     Hypertrophy of prostate without urinary obstruction and other lower urinary tract symptoms (LUTS)     Skin cancer     Sun-damaged skin     Type II or unspecified type diabetes mellitus without mention of complication, not stated as uncontrolled 2/27/2013       Past Surgical History:   Procedure Laterality Date    HX APPENDECTOMY      when i was a kid    HX HEENT      cataracts removed bilat.     HX KNEE REPLACEMENT  01/25/14    right knee    HX ORTHOPAEDIC  2007    TKR Left    HX OTHER SURGICAL  2/8/2013 LVATS, pulmonary decortication       Current Outpatient Prescriptions   Medication Sig Dispense Refill    ciprofloxacin-dexamethasone (CIPRODEX) 0.3-0.1 % otic suspension       meloxicam (MOBIC) 15 mg tablet TAKE 1 TABLET (15 MG TOTAL) BY MOUTH DAILY.  ofloxacin (FLOXIN) 0.3 % ophthalmic solution       budesonide-formoterol (SYMBICORT) 80-4.5 mcg/actuation HFAA       tiZANidine (ZANAFLEX) 4 mg tablet Take 4 mg by mouth.  losartan-hydroCHLOROthiazide (HYZAAR) 100-25 mg per tablet Take 1 Tab by mouth.  simvastatin (ZOCOR) 20 mg tablet TAKE 1 TABLET AT NIGHT      diclofenac EC (VOLTAREN) 50 mg EC tablet TAKE 1 TABLET BY MOUTH BY MOUTH 2 TIMES A DAY. 0    bupivacaine-EPINEPHrine (MARCAINE-EPINEPHRINE) 0.25 %-1:200,000 soln Used for mohs surgery 1.5 mL 0    CHOLECALCIFEROL, VITAMIN D3, (VITAMIN D3 PO) Take  by mouth.  fluoruracil (CARAC) 0.5 % topical cream Apply  to affected area daily. Apply to lesions as directed. Apply to right arm and hand for 1 month, then left arm and hand for 1 month, then front of lower legs for 1 month. 45 g 3    PREDNISONE by Does Not Apply route.  niacin ER (NIASPAN) 500 mg tablet Take 1 Tab by mouth daily (after dinner). 90 Tab 3    mometasone (NASONEX) 50 mcg/actuation nasal spray 2 Sprays by Both Nostrils route daily. 1 Container 5    HYDROcodone-homatropine (HYCODAN) 5-1.5 mg/5 mL (5 mL) syrup Take 5 mL by mouth three (3) times daily as needed. 150 mL 0    levofloxacin (LEVAQUIN) 500 mg tablet Take 1 Tab by mouth daily. 10 Tab 0    albuterol (PROVENTIL HFA) 90 mcg/actuation inhaler Take 2 Puffs by inhalation every four (4) hours as needed for Wheezing or Shortness of Breath. 1 Inhaler 1    losartan-hydrochlorothiazide (HYZAAR) 100-25 mg per tablet Take 1 Tab by mouth daily. 90 Tab 3    simvastatin (ZOCOR) 20 mg tablet TAKE 1 TABLET AT NIGHT 90 Tab 3    tamsulosin (FLOMAX) 0.4 mg capsule Take 0.4 mg by mouth daily.       celecoxib (CELEBREX) 200 mg capsule Take 1 Cap by mouth as directed. Take 1 tablet once or twice a day as needed for arthritis pain. 180 Cap 3    Blood-Glucose Meter (ONE TOUCH ULTRA SYSTEM KIT) monitoring kit Use as directed daily 1 Kit 0    glucose blood VI test strips (ONE TOUCH ULTRA TEST) strip Monitor BS daily 1 Package 11    Lancets (ONE TOUCH ULTRASOFT LANCETS) Misc Monitor BS daily 1 Package 11    aspirin 81 mg tablet Take 81 mg by mouth.  finasteride (PROSCAR) 5 mg tablet Take 5 mg by mouth daily.  alfuzosin (UROXATRAL) 10 mg SR tablet Take 10 mg by mouth daily. Allergies   Allergen Reactions    Pcn [Penicillins] Swelling         Objective:    Visit Vitals    /82 (BP 1 Location: Left arm, BP Patient Position: Sitting)    Pulse 81    Temp 97.9 °F (36.6 °C) (Oral)    Resp 20    Ht 5' 6\" (1.676 m)    Wt 69.4 kg (153 lb)    SpO2 95%    BMI 24.69 kg/m2       Aliza Lucas is a 80 y.o. male who appears well and in no distress. He is awake, alert, and oriented. A limited skin examination was completed including his left anterior thigh. He has a 7 x 7 mm erythematous keratotic tender papule on his left anterior thigh consistent with squamous cell carcinoma. Assessment/Plan:    1. Neoplasm of Uncertain Behavior, left anterior thigh, favor SCC. The differential diagnoses were discussed. A shave biopsy followed by curettage was advised to address this lesion with malignant destruction. The procedure was reviewed and verbal and written consent were obtained. The risks of pain, bleeding, infection, scar, and recurrence of the lesion were discussed. I performed the procedure. The site was cleansed and anesthetized with 1% Lidocaine with Epinephrine 1:100,000. A shave removal was performed to remove the lesion in its clinical entirety followed by curettage of the base and a 2 mm margin, resulting in a post curettage defect size of 11 x 11 mm. Drysol was used for hemostasis.   The wound was bandaged and care reviewed. The specimen was sent to pathology. I will contact the patient with the results and any further treatment that may be necessary. This plan was reviewed with the patient and patient agrees. All questions were answered. This scribe documentation was reviewed by me and accurately reflects the examination and decisions made by me. [facility]   OFFICE PROCEDURE PROGRESS NOTE   Chart reviewed for the following:   Hood WATSON, have reviewed the History, Physical and updated the Allergic reactions for Bernarda Doherty. TIME OUT performed immediately prior to start of procedure:   IJosette, have performed the following reviews on Bernarda Doherty   prior to the start of the procedure:     * Patient was identified by name and date of birth   * Agreement on procedure being performed was verified   * Risks and Benefits explained to the patient   * Procedure site verified and marked as necessary   * Patient was positioned for comfort   * Consent was signed and verified     Time: 10:20 AM   Date of procedure: 1/18/2018  Procedure performed by: Sunny Isaac.  Ronda Wolfe, [address]  Provider assisted by: Nell Bernard MA   Patient assisted by: self   How tolerated by patient: tolerated the procedure well with no complications   Comments: none

## 2018-01-22 NOTE — PROGRESS NOTES
I spoke with his wife and she states he is healing well. She understands the results of SCC and that this was curetted. No further tx needed at this time.

## 2018-07-12 ENCOUNTER — OFFICE VISIT (OUTPATIENT)
Dept: DERMATOLOGY | Facility: AMBULATORY SURGERY CENTER | Age: 83
End: 2018-07-12

## 2018-07-12 VITALS
RESPIRATION RATE: 14 BRPM | HEIGHT: 66 IN | DIASTOLIC BLOOD PRESSURE: 62 MMHG | HEART RATE: 82 BPM | BODY MASS INDEX: 25.39 KG/M2 | TEMPERATURE: 97.7 F | SYSTOLIC BLOOD PRESSURE: 124 MMHG | OXYGEN SATURATION: 97 % | WEIGHT: 158 LBS

## 2018-07-12 DIAGNOSIS — D48.7 NEOPLASM OF UNCERTAIN BEHAVIOR OF FOREARM: ICD-10-CM

## 2018-07-12 DIAGNOSIS — L57.0 ACTINIC KERATOSIS: Primary | ICD-10-CM

## 2018-07-12 DIAGNOSIS — L73.8 SEBACEOUS HYPERPLASIA OF FACE: ICD-10-CM

## 2018-07-12 DIAGNOSIS — L90.5 SCAR CONDITION AND FIBROSIS OF SKIN: ICD-10-CM

## 2018-07-12 DIAGNOSIS — Z85.828 HISTORY OF NONMELANOMA SKIN CANCER: ICD-10-CM

## 2018-07-12 DIAGNOSIS — L82.1 OTHER SEBORRHEIC KERATOSIS: ICD-10-CM

## 2018-07-12 DIAGNOSIS — D48.5 NEOPLASM OF UNCERTAIN BEHAVIOR OF SKIN OF EAR: ICD-10-CM

## 2018-07-12 DIAGNOSIS — Z85.828 FOLLOW-UP SURVEILLANCE OF SKIN CANCER, ENCOUNTER FOR: ICD-10-CM

## 2018-07-12 DIAGNOSIS — Z08 FOLLOW-UP SURVEILLANCE OF SKIN CANCER, ENCOUNTER FOR: ICD-10-CM

## 2018-07-12 RX ORDER — FLUOROURACIL 50 MG/G
CREAM TOPICAL
Qty: 80 G | Refills: 0 | Status: SHIPPED | OUTPATIENT
Start: 2018-07-12

## 2018-07-12 NOTE — PROGRESS NOTES
Gilberto Mejias is a 80 y.o. male        Chief Complaint   Patient presents with    Skin Problem       1. Have you been to the ER, urgent care clinic since your last visit? Hospitalized since your last visit? No    2. Have you seen or consulted any other health care providers outside of the [address] [place] [address] since your last visit? Include any pap smears or colon screening.  No

## 2018-07-12 NOTE — PROGRESS NOTES
Name: Nabila Grigsby       Age: 80 y.o. Date: 7/12/2018    Chief Complaint:   Chief Complaint   Patient presents with    Skin Problem       Subjective:    HPI  Mr. Nabila Grigsby is a 80 y.o. male who presents for a full skin exam.  The patient's last skin exam was on 1/18/18 and the patient does have current complaints related to his skin. He notes a recurrent scaly area on the left ear, a bump on the left forearm and scaly lesions on the arms and legs. The patient's pertinent skin history includes : Personal history of multiple nonmelanoma skin cancers as well as actinic keratoses. ROS: Constitutional: Negative. Dermatological : positive for - skin lesion changes    Social History     Social History    Marital status:      Spouse name: N/A    Number of children: N/A    Years of education: N/A     Occupational History    Not on file.      Social History Main Topics    Smoking status: Never Smoker    Smokeless tobacco: Never Used    Alcohol use 0.0 oz/week     1 - 2 Standard drinks or equivalent per week      Comment: occ, moderatley    Drug use: No    Sexual activity: Yes     Partners: Female     Birth control/ protection: None     Other Topics Concern    Not on file     Social History Narrative       Family History   Problem Relation Age of Onset    Heart Disease Mother     Heart Disease Father     Arthritis-rheumatoid Neg Hx        Past Medical History:   Diagnosis Date    Arthritis     knees liliya    Asthma     Empyema of lung ([facility] [address] 2/2013    Hypercholesteremia     Hypertension     Hypertrophy of prostate without urinary obstruction and other lower urinary tract symptoms (LUTS)     Skin cancer     Sun-damaged skin     Type II or unspecified type diabetes mellitus without mention of complication, not stated as uncontrolled 2/27/2013       Past Surgical History:   Procedure Laterality Date    HX APPENDECTOMY      when i was a kid    HX HEENT      cataracts removed bilat.    HX KNEE REPLACEMENT  01/25/14    right knee    HX ORTHOPAEDIC  2007    TKR Left    HX OTHER SURGICAL  2/8/2013    LVATS, pulmonary decortication       Current Outpatient Prescriptions   Medication Sig Dispense Refill    budesonide-formoterol (SYMBICORT) 80-4.5 mcg/actuation HFAA       losartan-hydroCHLOROthiazide (HYZAAR) 100-25 mg per tablet Take 1 Tab by mouth.  losartan-hydrochlorothiazide (HYZAAR) 100-25 mg per tablet Take 1 Tab by mouth daily. 90 Tab 3    simvastatin (ZOCOR) 20 mg tablet TAKE 1 TABLET AT NIGHT 90 Tab 3    aspirin 81 mg tablet Take 81 mg by mouth.  finasteride (PROSCAR) 5 mg tablet Take 5 mg by mouth daily.  alfuzosin (UROXATRAL) 10 mg SR tablet Take 10 mg by mouth daily.  diclofenac EC (VOLTAREN) 50 mg EC tablet TAKE 1 TABLET BY MOUTH BY MOUTH 2 TIMES A DAY.  clindamycin (CLEOCIN) 300 mg capsule TAKE 1 CAPSULE THREE TIMES DAILY  0    FLUZONE HIGH-DOSE 2017-18, PF, syrg injection TO BE ADMINISTERED BY PHARMACIST FOR IMMUNIZATION  0    predniSONE (STERAPRED DS) 10 mg dose pack TAKE AS DIRECTED ON PACKAGE  0    ciprofloxacin-dexamethasone (CIPRODEX) 0.3-0.1 % otic suspension       meloxicam (MOBIC) 15 mg tablet TAKE 1 TABLET (15 MG TOTAL) BY MOUTH DAILY.  ofloxacin (FLOXIN) 0.3 % ophthalmic solution       tiZANidine (ZANAFLEX) 4 mg tablet Take 4 mg by mouth.  simvastatin (ZOCOR) 20 mg tablet TAKE 1 TABLET AT NIGHT      diclofenac EC (VOLTAREN) 50 mg EC tablet TAKE 1 TABLET BY MOUTH BY MOUTH 2 TIMES A DAY. 0    bupivacaine-EPINEPHrine (MARCAINE-EPINEPHRINE) 0.25 %-1:200,000 soln Used for mohs surgery 1.5 mL 0    CHOLECALCIFEROL, VITAMIN D3, (VITAMIN D3 PO) Take  by mouth.  fluoruracil (CARAC) 0.5 % topical cream Apply  to affected area daily. Apply to lesions as directed. Apply to right arm and hand for 1 month, then left arm and hand for 1 month, then front of lower legs for 1 month. 45 g 3    PREDNISONE by Does Not Apply route.  niacin ER (NIASPAN) 500 mg tablet Take 1 Tab by mouth daily (after dinner). 90 Tab 3    mometasone (NASONEX) 50 mcg/actuation nasal spray 2 Sprays by Both Nostrils route daily. 1 Container 5    HYDROcodone-homatropine (HYCODAN) 5-1.5 mg/5 mL (5 mL) syrup Take 5 mL by mouth three (3) times daily as needed. 150 mL 0    levofloxacin (LEVAQUIN) 500 mg tablet Take 1 Tab by mouth daily. 10 Tab 0    albuterol (PROVENTIL HFA) 90 mcg/actuation inhaler Take 2 Puffs by inhalation every four (4) hours as needed for Wheezing or Shortness of Breath. 1 Inhaler 1    tamsulosin (FLOMAX) 0.4 mg capsule Take 0.4 mg by mouth daily.  celecoxib (CELEBREX) 200 mg capsule Take 1 Cap by mouth as directed. Take 1 tablet once or twice a day as needed for arthritis pain. 180 Cap 3    Blood-Glucose Meter (ONE TOUCH ULTRA SYSTEM KIT) monitoring kit Use as directed daily 1 Kit 0    glucose blood VI test strips (ONE TOUCH ULTRA TEST) strip Monitor BS daily 1 Package 11    Lancets (ONE TOUCH ULTRASOFT LANCETS) Misc Monitor BS daily 1 Package 11       Allergies   Allergen Reactions    Pcn [Penicillins] Swelling         Objective:    Visit Vitals    /62 (BP 1 Location: Left arm, BP Patient Position: Sitting)    Pulse 82    Temp 97.7 °F (36.5 °C) (Oral)    Resp 14    Ht 5' 6\" (1.676 m)    Wt 158 lb (71.7 kg)    SpO2 97%    BMI 25.5 kg/m2       Matty Santos is a 80 y.o. male who appears well and in no distress. He is awake, alert, and oriented. There is no preauricular, submandibular, or cervical lymphadenopathy. A skin examination was performed including his scalp, face (including eyelids), ears, neck, chest, back, abdomen, upper extremities (including digits/nails), lower extremities, breasts, buttocks; genital skin was not examined. He has well-healed surgical sites with no sign of recurrence. He has scattered waxy macules and keratotic papules consistent with seborrheic keratoses.  There are pink/yellow papules on the face consistent with sebaceous hyperplasia - including one which is excoriated on the nose near a surgical site. He has scattered thin-scaled actinic keratoses on his arms and anterior shins. He has thin-scaled actinic keratoses on his left cheek, right forearm, nose x 2, and right hand x 3. He has a 7 mm x 6mm erythematous keratotic papule on on his left forearm. He has a 3 x 2 mm pink keratotic macule on his left mid helical rim. He has dermal pigmented macule on the left upper back with an edge of a seborrheic keratosis present. Assessment/Plan:    1. Personal history of skin cancer. I discussed sun protection, sunscreen use, the warning signs of skin cancer, the need for self-skin examinations, and the need for regular practitioner exams every 6 months. The patient should follow up sooner as needed if new, changing, or symptomatic skin lesions arise. 2. Seborrheic keratoses. The diagnosis was reviewed and the patient was reassured that no treatment is needed for these benign lesions. 3. Sebaceous Hyperplasia. The diagnosis was discussed as well as the benign nature of this condition. The patient was reassured that no treatment is needed at this time. 4. Actinic Keratoses. The diagnosis of this precancerous lesion related to sun exposure was reviewed. Verbal consent was obtained. I treated 7 lesions with cryotherapy and post-cryotherapy care was reviewed. I recommended the use of Efudex on the his arms and anterior shins. 5. Neoplasm of Uncertain Behavior, left forearm. The differential diagnoses were discussed. A shave biopsy was advised to sample this lesion. Curettage was also advised to address this lesion with malignant destruction. The procedure was reviewed and verbal and written consent were obtained. The risks of pain, bleeding, infection, scar, and recurrence of the lesion were discussed. I performed the procedure.   The site was cleansed and anesthetized with 1% Lidocaine with Epinephrine 1:100,000. Curettage was performed by me to include a 2 mm margin to a total size of 11 x 10 mm. Drysol was used for hemostasis. The wound was bandaged and care reviewed. The specimen was sent to pathology. I will contact the patient with the results and any further treatment that may be necessary. 6. Neoplasm of Uncertain Behavior, left mid helical rim. The differential diagnoses were discussed. A shave biopsy was advised to sample this lesion. The procedure was reviewed and verbal and written consent were obtained. The risks of pain, bleeding, infection, and scar were discussed. The patient is aware that this is a sample and is intended for diagnosis and not therapy of the skin lesion. I performed the procedure. The site was cleansed and anesthetized with 1% Lidocaine with Epinephrine 1:100,000. A shave biopsy was performed to sample the lesion. Drysol was used for hemostasis. The wound was bandaged and care reviewed. The specimen was sent to pathology. I will contact the patient with the results and any further treatment that may be necessary. [facility]   OFFICE PROCEDURE PROGRESS NOTE   Chart reviewed for the following:   Bella WATSON, have reviewed the History, Physical and updated the Allergic reactions for Ramona Wilkins. TIME OUT performed immediately prior to start of procedure:   Josette WATSON, have performed the following reviews on Ramona Wilkins   prior to the start of the procedure:     * Patient was identified by name and date of birth   * Agreement on procedure being performed was verified   * Risks and Benefits explained to the patient   * Procedure site verified and marked as necessary   * Patient was positioned for comfort   * Consent was signed and verified     Time: 9:00 AM  Date of procedure: 7/12/2018  Procedure performed by: Demetrius Langston.  Livier Hicks  Provider assisted by: Gabriele Erickson RN  Patient assisted by: self   How tolerated by patient: tolerated the procedure well with no complications   Comments: none

## 2018-07-13 ENCOUNTER — HOSPITAL ENCOUNTER (OUTPATIENT)
Dept: LAB | Age: 83
Discharge: HOME | End: 2018-07-13

## 2018-07-17 NOTE — PROGRESS NOTES
I spoke with the patient and he is aware of the diagnosis of Diaz Bhatia which was curetted at the visit. He also understands the diagnosis on the ear - he will use his 5-Fu when he receives this.

## 2019-03-20 ENCOUNTER — HOSPITAL ENCOUNTER (OUTPATIENT)
Dept: LAB | Age: 84
Discharge: HOME | End: 2019-03-20

## 2019-03-20 ENCOUNTER — OFFICE VISIT (OUTPATIENT)
Dept: DERMATOLOGY | Facility: AMBULATORY SURGERY CENTER | Age: 84
End: 2019-03-20

## 2019-03-20 VITALS
BODY MASS INDEX: 25.39 KG/M2 | DIASTOLIC BLOOD PRESSURE: 80 MMHG | HEIGHT: 66 IN | WEIGHT: 158 LBS | HEART RATE: 77 BPM | TEMPERATURE: 97.6 F | RESPIRATION RATE: 16 BRPM | OXYGEN SATURATION: 97 % | SYSTOLIC BLOOD PRESSURE: 138 MMHG

## 2019-03-20 DIAGNOSIS — L73.8 SEBACEOUS HYPERPLASIA OF FACE: ICD-10-CM

## 2019-03-20 DIAGNOSIS — Z85.828 HISTORY OF NONMELANOMA SKIN CANCER: ICD-10-CM

## 2019-03-20 DIAGNOSIS — D48.5 NEOPLASM OF UNCERTAIN BEHAVIOR OF SKIN OF LOWER LEG: ICD-10-CM

## 2019-03-20 DIAGNOSIS — L57.0 ACTINIC KERATOSES: ICD-10-CM

## 2019-03-20 DIAGNOSIS — D48.5 NEOPLASM OF UNCERTAIN BEHAVIOR OF SKIN OF SHOULDER: Primary | ICD-10-CM

## 2019-03-20 DIAGNOSIS — Z85.820 PERSONAL HISTORY OF MALIGNANT MELANOMA OF SKIN: ICD-10-CM

## 2019-03-20 DIAGNOSIS — L82.1 SEBORRHEIC KERATOSES: ICD-10-CM

## 2019-03-20 DIAGNOSIS — L70.0 OPEN COMEDONE: ICD-10-CM

## 2019-03-20 DIAGNOSIS — D22.9 MULTIPLE BENIGN NEVI: ICD-10-CM

## 2019-03-20 PROCEDURE — 88341 IMHCHEM/IMCYTCHM EA ADD ANTB: CPT

## 2019-03-20 RX ORDER — AZITHROMYCIN 250 MG/1
250 TABLET, FILM COATED ORAL
COMMUNITY
End: 2022-04-08

## 2019-03-20 RX ORDER — ALBUTEROL SULFATE 90 UG/1
90 AEROSOL, METERED RESPIRATORY (INHALATION)
COMMUNITY
End: 2022-04-08

## 2019-03-20 NOTE — PATIENT INSTRUCTIONS
Chief Complaint   Patient presents with    Skin Exam     Areas of concern: Left posterior calf     Self Skin Exam and Sunscreens    Early detection and treatment is essential in the treatment of all forms of skin cancer. If caught early, all forms of skin cancer are curable. In addition to your regular visits, you should perform a monthly skin examination. Over time, you become familiar with what is normally found on your skin and can identify new or suspicious spots. One of the screening tools you can use to assess your skin is to follow the ABCDEs:    A= Asymmetry (One half is unlike the other half)     B= Border (An irregular, scalloped or poorly defined edge)    C= Color (Is varied from one area to another, has shades of tan, brown/ black, white, red or blue)    D= Diameter (Spots larger than 6mm or a pencil eraser)    E= Evolving (New spots or one that is changing in size, shape, or color)    A follow- up interval will be customized based on your history of skin cancer or level of skin damage and risk factors. In any case, if you notice a suspicious or new spot, an appointment should be arranged between regular visits. Everyone should use sunscreen and sun-safe practices, which is especially important for those with a personal or family history of skin cancer. Suggestions for this include:    1. Use daily moisturizers containing SPF 30 or higher. 2. Wear long sleeve clothing with UPF ratings and a broad-brimmed hat. 3. Apply sunscreen with SPF 30 or higher to all sun exposed areas if you are going to be in the sun. A broad spectrum UVA/ UVB sunscreen is best.  Dont forget to REAPPLY every two hours or more often if swimming or sweating! 4. Avoid outside activities during peak sun hours, especially in the summer (10am- 2pm). 5. DO NOT use tanning beds.     Using sunscreen and sun-safe practices can help reduce the likelihood of developing skin cancer or additional skin cancers in those previously diagnosed.

## 2019-03-20 NOTE — PROGRESS NOTES
Written by Arslan Pacheco, as dictated by Bethany Young Basket, [address]. Name: Kristy Simon       Age: 80 y.o. Date: 3/20/2019    Chief Complaint:   Chief Complaint   Patient presents with    Skin Exam     Areas of concern: Left posterior calf       Subjective:    HPI  Mr. Kristy Simon is a 80 y.o. male who presents for a full skin exam.  The patient's last skin exam was on 07/12/18 and the patient does have current complaints related to his skin. He reports a tender lesion on the left posterior calf that has been present for about 1 year. He is feeling well and in his usual state of health today. He has no current illnesses, no other skin concerns. His allergies, medications, medical, and social history are reviewed by me today. The patient's pertinent skin history includes : personal history of melanoma in situ, multiple NMSC, and AKs  -BCC, left forearm, curettage, 07/12/18  -ASP, left mid helical rim, 5-FU, 25/98/73  -SCC, left anterior thigh, curettage, 01/18/18  -BCC, left temple, excision, 10/24/17  -BCC, left posterior ankle, 5-FU, 10/24/17  -SCC, right lower leg, curettage, 04/17/17  -BCC, left nasal tip, Mohs, 11/03/16  -BCC, right maxillary cheek, Mohs, 09/22/15  -SCC, right posterior calf, Mohs, 09/09/15  -SCCi, left lower leg, Mohs, 12/02/14  -SCC, midback, curettage, 09/15/14  -MMi, left abdomen, excision, 09/02/14    ROS: Constitutional: Negative.     Dermatological : positive for - skin lesion changes    Social History     Socioeconomic History    Marital status:      Spouse name: Not on file    Number of children: Not on file    Years of education: Not on file    Highest education level: Not on file   Occupational History    Not on file   Social Needs    Financial resource strain: Not on file    Food insecurity:     Worry: Not on file     Inability: Not on file    Transportation needs:     Medical: Not on file     Non-medical: Not on file   Tobacco Use    Smoking status: Never Smoker    Smokeless tobacco: Never Used   Substance and Sexual Activity    Alcohol use: Yes     Alcohol/week: 0.0 oz     Types: 1 - 2 Standard drinks or equivalent per week     Comment: occ, moderatley    Drug use: No    Sexual activity: Yes     Partners: Female     Birth control/protection: None   Lifestyle    Physical activity:     Days per week: Not on file     Minutes per session: Not on file    Stress: Not on file   Relationships    Social connections:     Talks on phone: Not on file     Gets together: Not on file     Attends Caodaism service: Not on file     Active member of club or organization: Not on file     Attends meetings of clubs or organizations: Not on file     Relationship status: Not on file    Intimate partner violence:     Fear of current or ex partner: Not on file     Emotionally abused: Not on file     Physically abused: Not on file     Forced sexual activity: Not on file   Other Topics Concern    Not on file   Social History Narrative    Not on file       Family History   Problem Relation Age of Onset    Heart Disease Mother     Heart Disease Father     Arthritis-rheumatoid Neg Hx        Past Medical History:   Diagnosis Date    Arthritis     knees liliya    Asthma     Empyema of lung ([facility] [address] 2/2013    Hypercholesteremia     Hypertension     Hypertrophy of prostate without urinary obstruction and other lower urinary tract symptoms (LUTS)     Skin cancer     Sun-damaged skin     Type II or unspecified type diabetes mellitus without mention of complication, not stated as uncontrolled 2/27/2013       Past Surgical History:   Procedure Laterality Date    HX APPENDECTOMY      when i was a kid    HX HEENT      cataracts removed bilat.     HX KNEE REPLACEMENT  01/25/14    right knee    HX ORTHOPAEDIC  2007    TKR Left    HX OTHER SURGICAL  2/8/2013    LVATS, pulmonary decortication       Current Outpatient Medications   Medication Sig Dispense Refill    azithromycin (ZITHROMAX) 250 mg tablet Take 250 mg by mouth every Monday, Wednesday, Friday.  albuterol (PROAIR HFA) 90 mcg/actuation inhaler Take 90 mcg by inhalation every four (4) hours as needed.  fluorouracil (EFUDEX) 5 % chemo cream Apply twice daily for 4 weeks to arms, hands and shins 80 g 0    budesonide-formoterol (SYMBICORT) 80-4.5 mcg/actuation HFAA       losartan-hydroCHLOROthiazide (HYZAAR) 100-25 mg per tablet Take 1 Tab by mouth.  simvastatin (ZOCOR) 20 mg tablet TAKE 1 TABLET AT NIGHT      albuterol (PROVENTIL HFA) 90 mcg/actuation inhaler Take 2 Puffs by inhalation every four (4) hours as needed for Wheezing or Shortness of Breath. 1 Inhaler 1    losartan-hydrochlorothiazide (HYZAAR) 100-25 mg per tablet Take 1 Tab by mouth daily. 90 Tab 3    simvastatin (ZOCOR) 20 mg tablet TAKE 1 TABLET AT NIGHT 90 Tab 3    Blood-Glucose Meter (ONE TOUCH ULTRA SYSTEM KIT) monitoring kit Use as directed daily 1 Kit 0    glucose blood VI test strips (ONE TOUCH ULTRA TEST) strip Monitor BS daily 1 Package 11    Lancets (ONE TOUCH ULTRASOFT LANCETS) Misc Monitor BS daily 1 Package 11    aspirin 81 mg tablet Take 81 mg by mouth.  finasteride (PROSCAR) 5 mg tablet Take 5 mg by mouth daily.  diclofenac EC (VOLTAREN) 50 mg EC tablet TAKE 1 TABLET BY MOUTH BY MOUTH 2 TIMES A DAY.  clindamycin (CLEOCIN) 300 mg capsule TAKE 1 CAPSULE THREE TIMES DAILY  0    FLUZONE HIGH-DOSE 2017-18, PF, syrg injection TO BE ADMINISTERED BY PHARMACIST FOR IMMUNIZATION  0    predniSONE (STERAPRED DS) 10 mg dose pack TAKE AS DIRECTED ON PACKAGE  0    ciprofloxacin-dexamethasone (CIPRODEX) 0.3-0.1 % otic suspension       meloxicam (MOBIC) 15 mg tablet TAKE 1 TABLET (15 MG TOTAL) BY MOUTH DAILY.  ofloxacin (FLOXIN) 0.3 % ophthalmic solution       tiZANidine (ZANAFLEX) 4 mg tablet Take 4 mg by mouth.  diclofenac EC (VOLTAREN) 50 mg EC tablet TAKE 1 TABLET BY MOUTH BY MOUTH 2 TIMES A DAY.   0  bupivacaine-EPINEPHrine (MARCAINE-EPINEPHRINE) 0.25 %-1:200,000 soln Used for mohs surgery 1.5 mL 0    CHOLECALCIFEROL, VITAMIN D3, (VITAMIN D3 PO) Take  by mouth.  PREDNISONE by Does Not Apply route.  niacin ER (NIASPAN) 500 mg tablet Take 1 Tab by mouth daily (after dinner). 90 Tab 3    mometasone (NASONEX) 50 mcg/actuation nasal spray 2 Sprays by Both Nostrils route daily. 1 Container 5    HYDROcodone-homatropine (HYCODAN) 5-1.5 mg/5 mL (5 mL) syrup Take 5 mL by mouth three (3) times daily as needed. 150 mL 0    levofloxacin (LEVAQUIN) 500 mg tablet Take 1 Tab by mouth daily. 10 Tab 0    tamsulosin (FLOMAX) 0.4 mg capsule Take 0.4 mg by mouth daily.  celecoxib (CELEBREX) 200 mg capsule Take 1 Cap by mouth as directed. Take 1 tablet once or twice a day as needed for arthritis pain. 180 Cap 3    alfuzosin (UROXATRAL) 10 mg SR tablet Take 10 mg by mouth daily. Allergies   Allergen Reactions    Pcn [Penicillins] Swelling         Objective:    Visit Vitals  /80 (BP 1 Location: Left arm, BP Patient Position: Sitting)   Pulse 77   Temp 97.6 °F (36.4 °C)   Resp 16   Ht 5' 6\" (1.676 m)   Wt 158 lb (71.7 kg)   SpO2 97%   BMI 25.50 kg/m²       Missy Herrera is a 80 y.o. male who appears well and in no distress. He is awake, alert, and oriented. There is no preauricular, submandibular, or cervical lymphadenopathy. A skin examination was performed including his scalp, face (including eyelids), ears, neck, chest, back, abdomen, upper extremities (including digits/nails), lower extremities, breasts, buttocks; genital skin was not examined. There are multiple well-healed scars without evidence of lesion recurrence. There is a 9 x 7 mm irregularly tan and darkly pigmented macule on the right shoulder, concerning for SK vs atypical pigmented lesion. There is a 10 x 8 mm erythematous keratotic tender papule on the left lateral shin, most consistent with squamous cell carcinoma.  There are actinic keratoses on the right forehead, right cheek, right helical rim, left ear x2, left eyebrow, and left posterior calf. He has diffuse actinic keratoses on the hands and right forearm. There is no residual atypical squamous proliferation on the left mid helical rim. There is an open comedone on the right cheek. There are pink/yellow papules on the face consistent with sebaceous hyperplasia. He has scattered waxy macules and keratotic papules consistent with seborrheic keratoses. He has pink intradermal nevi and brown junctional nevi, no concerning features for severe atypia. Right shoulder      Left lateral shin    Assessment/Plan:    1. Personal history of melanoma in situ and nonmelanoma skin cancer. I discussed sun protection, sunscreen use, the warning signs of skin cancer, the need for self-skin examinations, and the need for regular practitioner exams every 6 months. The patient should follow up sooner as needed if new, changing, or symptomatic skin lesions arise. 2. Neoplasm of Uncertain Behavior, right shoulder, R/O SK vs atypical pigmented lesion. The differential diagnoses were discussed. A shave biopsy was advised to sample this lesion. The procedure was reviewed and verbal and written consent were obtained. The risks of pain, bleeding, infection, and scar were discussed. The patient is aware that this is a sample and is intended for diagnosis and not therapy of the skin lesion. I performed the procedure. The site was cleansed and anesthetized with 1% Lidocaine with Epinephrine 1:100,000. A shave biopsy was performed to sample the lesion. Drysol was used for hemostasis. The wound was bandaged and care reviewed. The specimen was sent to pathology. I will contact the patient with the results and any further treatment that may be necessary. 3. Neoplasm of Uncertain Behavior, left lateral shin, favor SCC. The differential diagnoses were discussed.  A shave biopsy was advised to sample this lesion. The procedure was reviewed and verbal and written consent were obtained. The risks of pain, bleeding, infection, and scar were discussed. The patient is aware that this is a sample and is intended for diagnosis and not therapy of the skin lesion. I performed the procedure. The site was cleansed and anesthetized with 1% Lidocaine with Epinephrine 1:100,000. A shave biopsy was performed to sample the lesion. Drysol was used for hemostasis. The wound was bandaged and care reviewed. The specimen was sent to pathology. I will contact the patient with the results and any further treatment that may be necessary. 4. Actinic Keratoses. The diagnosis of this precancerous lesion related to sun exposure was reviewed. Verbal consent was obtained. I treated 7 lesions with cryotherapy and post-cryotherapy care was reviewed. He will treat his hands and right forearm with 5-FU. Use and side effects were reviewed. 5. Atypical squamous proliferation, left mid helical rim. The diagnosis was reviewed. Cryotherapy was advised to address this lesion. Verbal consent was obtained. I treated this lesion with cryotherapy and post-cryotherapy care was reviewed. 6. Open comedone. The diagnosis was discussed and the patient desires removal. After verbal permission, the area was cleansed with Alcohol and the material successfully extracted with a comedone extractor. 7. Sebaceous Hyperplasia. The diagnosis was discussed as well as the benign nature of this condition. The patient was reassured that no treatment is needed at this time. 8. Seborrheic keratoses. The diagnosis was reviewed and the patient was reassured that no treatment is needed for these benign lesions. 9. Normal nevi. The diagnosis of normal nevi was reviewed.   I discussed sun protection, sunscreen use, the warning signs of skin cancer, the need for self-skin examinations, and the need for regular practitioner exams every 6 months. The patient should follow up sooner as needed if new, changing, or symptomatic skin lesions arise. This plan was reviewed with the patient and patient agrees. All questions were answered. This scribe documentation was reviewed by me and accurately reflects the examination and decisions made by me. [facility]   OFFICE PROCEDURE PROGRESS NOTE   Chart reviewed for the following:   Hood WATSON, have reviewed the History, Physical and updated the Allergic reactions for Mary Ann Andrzej. TIME OUT performed immediately prior to start of procedure:   Josette WATSON, have performed the following reviews on Mary Ann Stacks   prior to the start of the procedure:     * Patient was identified by name and date of birth   * Agreement on procedure being performed was verified   * Risks and Benefits explained to the patient   * Procedure site verified and marked as necessary   * Patient was positioned for comfort   * Consent was signed and verified     Time: 11:35 AM  Date of procedure: 3/20/2019  Procedure performed by: Sarah Zhao  Provider assisted by: Beulah Gates RN   Patient assisted by: self   How tolerated by patient: tolerated the procedure well with no complications   Comments: none

## 2019-03-26 NOTE — PROGRESS NOTES
I spoke with the pt and he is aware of the diagnosis of MMi on the right shoulder requiring excision and at least a thickened AK on the shin which could be shaved, curetted or cryo. He is making an appt for a return visit for these with Dr. Dany Guadarrama now.

## 2019-04-10 ENCOUNTER — HOSPITAL ENCOUNTER (OUTPATIENT)
Dept: LAB | Age: 84
Discharge: HOME | End: 2019-04-10

## 2019-04-10 ENCOUNTER — OFFICE VISIT (OUTPATIENT)
Dept: DERMATOLOGY | Facility: AMBULATORY SURGERY CENTER | Age: 84
End: 2019-04-10

## 2019-04-10 VITALS
HEART RATE: 77 BPM | RESPIRATION RATE: 14 BRPM | WEIGHT: 158 LBS | SYSTOLIC BLOOD PRESSURE: 128 MMHG | HEIGHT: 66 IN | BODY MASS INDEX: 25.39 KG/M2 | OXYGEN SATURATION: 95 % | TEMPERATURE: 98 F | DIASTOLIC BLOOD PRESSURE: 62 MMHG

## 2019-04-10 DIAGNOSIS — D03.61 MELANOMA IN SITU OF RIGHT SHOULDER (HCC): Primary | ICD-10-CM

## 2019-04-10 NOTE — PROGRESS NOTES
Damien Chu is a 80 y.o. male presents to the office today with the following:  Chief Complaint   Patient presents with    Melanoma     excision on right shoulder       26-year-old  male presents for excision of biopsy-proven melanoma in situ on the right shoulder. He has not had any issues with the biopsy site since it was biopsied by Janet Rodrigues NP. He has no additional complaints today and is otherwise in his normal state of good health. Physical Exam   Constitutional: He is oriented to person, place, and time and well-developed, well-nourished, and in no distress. HENT:   Head: Normocephalic. Eyes: EOM are normal.   Pulmonary/Chest: Effort normal.   Neurological: He is alert and oriented to person, place, and time. Skin:   On the right shoulder there is a healed biopsy scar with a small amount of pigment at the margin. There is a small amount of pigment apparent on Woods lamp exam.       1. Melanoma in situ of right shoulder ([address]  Excision was advised for removal and therapy of this 2.5 cm bx-proven MIS on the right shoulder. The excision procedure was reviewed and verbal and written consents were obtained. The risks of pain, bleeding, infection, recurrence of the lesion, and scar were discussed. I performed the procedure. The site was cleansed and anesthetized with 1% lidocaine with epinephrine 1:100,000. The lesion was excised with a 5 mm margin in an elliptical manner to a depth of subcutaneous tissue. A intermediate primary closure was performed after the excision using 3-0 PDS buried vertical mattress sutures and 4-0 ethilon epidermal sutures. The closure length was 9.1 cm. The wound was bandaged and care reviewed. The specimen was sent to pathology. I will contact the patient with the results and any further treatment that may be necessary.                    Follow-up and Dispositions    · Return in about 2 weeks (around 4/24/2019) for Suture removal. Martin Moody MD     [facility]   OFFICE PROCEDURE PROGRESS NOTE   Chart reviewed for the following:   IRodolfo MD have reviewed the History, Physical and updated the Allergic reactions for Sung Hardy. TIME OUT performed immediately prior to start of procedure:   Marty Reed MD, have performed the following reviews on Sung Hardy   prior to the start of the procedure:     * Patient was identified by name and date of birth   * Agreement on procedure being performed was verified   * Risks and Benefits explained to the patient   * Procedure site verified and marked as necessary   * Patient was positioned for comfort   * Consent was signed and verified     Time: 3:30 PM  Date of procedure: 4/10/2019  Procedure performed by:  Rodolfo Jamison MD  Provider assisted by: Aj Cross LPN  Patient assisted by: self   How tolerated by patient: tolerated the procedure well with no complications   Comments: none

## 2019-04-10 NOTE — PATIENT INSTRUCTIONS
WOUND CARE INSTRUCTIONS    1. Keep the dressing clean and dry and do not remove for 48 hours. 2. Then change the dressing once a day as follows:  a. Wash hands before and after each dressing change. b. Remove dressing and wash site gently with mild soap and water, rinse, and pat dry.  c. Apply an ointment (Bacitracin, Polysporin, Neosporin, Petroleum jelly or Aquaphor). d. Apply a non-stick (Telfa) dressing or Band-Aid to cover the wound. 3. Watch for:  BLEEDING: A small amount of drainage may occur. If bleeding occurs, elevate and rest the surgery site. Apply gauze and steady pressure for 15 minutes. If bleeding continues, call this office. INFECTION: Signs of infection include increased redness, pain, warmth, drainage of pus, and fever. If this occurs, call this office. 4. Special Instructions (follow any that are checked):  · [x] You have stitches that DO need to be removed. · [x] Avoid bending at the waist and heavy lifting for two days. · [] Sleep with your head elevated for the next two nights. · [x] Rest the surgery site and keep it elevated as much as possible for two days. · [x] You may apply an ice-pack for 10-15 minutes every waking hour for the rest of the day. · [] Eat a soft diet and avoid hot food and hot drinks for the rest of the day. · [] Other instructions: Follow up as directed. Take Tylenol or Ibuprofen for pain as needed. Once the site is healed with no remaining bandages or open areas, protect your surgical site and scar from the sun, as this area will be more sensitive. Use a broad spectrum sunscreen SPF 30 or higher daily, and a chemical free product (one containing zinc oxide or titanium dioxide) is a good choice if the area is sensitive. You may begin to gently massage the surgical site in 2-3 weeks, rubbing in a circular motion along the scar. This can help reduce swelling and thickness of a scar.  A scar cream may be used beginnning 1 month after the surgery. If you have any questions or concerns, please call our office Monday through Friday at [phone]. You can also contact Dr. Maria Luisa Stokes directly for emergency purposes at [phone].

## 2019-04-16 NOTE — PROGRESS NOTES
Please let the patient know that the pathology results from his excision have returned. His melanoma in situ was completely removed and no further treatment is needed at this time. Thank you.

## 2019-04-17 ENCOUNTER — TELEPHONE (OUTPATIENT)
Dept: DERMATOLOGY | Facility: AMBULATORY SURGERY CENTER | Age: 84
End: 2019-04-17

## 2019-04-17 NOTE — TELEPHONE ENCOUNTER
----- Message from Gail Elmore MD sent at 4/16/2019  6:24 PM EDT -----  Please let the patient know that the pathology results from his excision have returned. His melanoma in situ was completely removed and no further treatment is needed at this time. Thank you.

## 2019-04-17 NOTE — TELEPHONE ENCOUNTER
Made phone call to patient, verified patient with two identifiers, name and date of birth. Informed patient margins were clear from excision of melanoma per MD.  Patient verbalized understanding and had no further questions.      Reminded patient of appointment 4/26 for suture removal.

## 2019-04-24 ENCOUNTER — CLINICAL SUPPORT (OUTPATIENT)
Dept: DERMATOLOGY | Facility: AMBULATORY SURGERY CENTER | Age: 84
End: 2019-04-24

## 2019-04-24 VITALS
RESPIRATION RATE: 16 BRPM | SYSTOLIC BLOOD PRESSURE: 128 MMHG | HEART RATE: 81 BPM | BODY MASS INDEX: 25.39 KG/M2 | DIASTOLIC BLOOD PRESSURE: 74 MMHG | OXYGEN SATURATION: 94 % | TEMPERATURE: 97.9 F | WEIGHT: 158 LBS | HEIGHT: 66 IN

## 2019-04-24 DIAGNOSIS — D03.61 MELANOMA IN SITU OF RIGHT SHOULDER (HCC): Primary | ICD-10-CM

## 2019-04-24 NOTE — PROGRESS NOTES
2:59 PM  Eddy Hudson, 80 y.o., male      Patient presents for suture removal from Right Shoulder r/t Excision. Skin Sutures removed by Omari Bermeo RN. Skin is appropriate for race. Skin is intact. Skin does not show signs of infection. Wound care and activity instructions given. Patient expresses understanding. Patient to follow up in 6 months with Abimbola Lambert.

## 2019-06-11 ENCOUNTER — HOSPITAL ENCOUNTER (OUTPATIENT)
Dept: MRI IMAGING | Age: 84
Discharge: HOME | End: 2019-06-11
Attending: ORTHOPAEDIC SURGERY
Payer: MEDICARE

## 2019-06-11 DIAGNOSIS — G57.01 LESION OF SCIATIC NERVE, RIGHT SIDE: ICD-10-CM

## 2019-06-11 DIAGNOSIS — M70.61 TROCHANTERIC BURSITIS OF RIGHT HIP: ICD-10-CM

## 2019-06-11 DIAGNOSIS — M25.551 RIGHT HIP PAIN: ICD-10-CM

## 2019-06-11 PROCEDURE — 73721 MRI JNT OF LWR EXTRE W/O DYE: CPT

## 2019-07-01 ENCOUNTER — HOSPITAL ENCOUNTER (OUTPATIENT)
Dept: MRI IMAGING | Age: 84
Discharge: HOME | End: 2019-07-01
Attending: ORTHOPAEDIC SURGERY
Payer: MEDICARE

## 2019-07-01 DIAGNOSIS — M54.40 LOW BACK PAIN WITH SCIATICA: ICD-10-CM

## 2019-07-01 PROCEDURE — 72148 MRI LUMBAR SPINE W/O DYE: CPT

## 2019-09-25 ENCOUNTER — OFFICE VISIT (OUTPATIENT)
Dept: DERMATOLOGY | Facility: AMBULATORY SURGERY CENTER | Age: 84
End: 2019-09-25

## 2019-09-25 VITALS
BODY MASS INDEX: 25.39 KG/M2 | SYSTOLIC BLOOD PRESSURE: 146 MMHG | HEIGHT: 66 IN | HEART RATE: 70 BPM | TEMPERATURE: 97.5 F | RESPIRATION RATE: 12 BRPM | DIASTOLIC BLOOD PRESSURE: 72 MMHG | OXYGEN SATURATION: 97 % | WEIGHT: 158 LBS

## 2019-09-25 DIAGNOSIS — L72.0 MILIAL CYST: ICD-10-CM

## 2019-09-25 DIAGNOSIS — Z87.2 HISTORY OF SEBORRHEIC KERATOSIS: ICD-10-CM

## 2019-09-25 DIAGNOSIS — D22.9 MULTIPLE BENIGN NEVI: ICD-10-CM

## 2019-09-25 DIAGNOSIS — Z86.006 HISTORY OF MELANOMA IN SITU: ICD-10-CM

## 2019-09-25 DIAGNOSIS — L57.0 ACTINIC KERATOSES: Primary | ICD-10-CM

## 2019-09-25 NOTE — PATIENT INSTRUCTIONS
Chief Complaint   Patient presents with    Skin Exam     Areas of concern: buttocks     Self Skin Exam and Sunscreens    Early detection and treatment is essential in the treatment of all forms of skin cancer. If caught early, all forms of skin cancer are curable. In addition to your regular visits, you should perform a monthly skin examination. Over time, you become familiar with what is normally found on your skin and can identify new or suspicious spots. One of the screening tools you can use to assess your skin is to follow the ABCDEs:    A= Asymmetry (One half is unlike the other half)     B= Border (An irregular, scalloped or poorly defined edge)    C= Color (Is varied from one area to another, has shades of tan, brown/ black, white, red or blue)    D= Diameter (Spots larger than 6mm or a pencil eraser)    E= Evolving (New spots or one that is changing in size, shape, or color)    A follow- up interval will be customized based on your history of skin cancer or level of skin damage and risk factors. In any case, if you notice a suspicious or new spot, an appointment should be arranged between regular visits. Everyone should use sunscreen and sun-safe practices, which is especially important for those with a personal or family history of skin cancer. Suggestions for this include:    1. Use daily moisturizers containing SPF 30 or higher. 2. Wear long sleeve clothing with UPF ratings and a broad-brimmed hat. 3. Apply sunscreen with SPF 30 or higher to all sun exposed areas if you are going to be in the sun. A broad spectrum UVA/ UVB sunscreen is best.  Dont forget to REAPPLY every two hours or more often if swimming or sweating! 4. Avoid outside activities during peak sun hours, especially in the summer (10am- 2pm). 5. DO NOT use tanning beds.     Using sunscreen and sun-safe practices can help reduce the likelihood of developing skin cancer or additional skin cancers in those previously diagnosed.

## 2019-09-25 NOTE — PROGRESS NOTES
Written by Chau Vyas, as dictated by Chioma Wisdom, [address]. Name: Purnima Nolan       Age: 80 y.o. Date: 9/25/2019    Chief Complaint:   Chief Complaint   Patient presents with    Skin Exam     Areas of concern: buttocks       Subjective:    HPI  Mr. Purnima Nolan is a 80 y.o. male who presents for a full skin exam.  The patient's last skin exam was on 3/20/19 and the patient does have current complaints related to his skin. The patient is concerned about a bothersome, inflamed and tender lesion on the buttocks that has existed since June. He states the lesion is especially bothersome when he is sitting down but not bothersome when he is laying down. He also denies the area being bothersome when he has a bowel movement. He was prescribed a topical cream by his doctor and has had improvement in his symptoms. He states he initially noted an irritated area there about 1 year ago. His wife states it was red in the area which has improved. The patient denies drainage. He is feeling well and in his usual state of health today. He has no current illnesses, no other skin concerns. His allergies, medications, medical, and social history are reviewed by me today. The patient's pertinent skin history includes : personal history of melanoma in situ, multiple NMSC, and AKs  -MMi, right shoulder, excision, 4/10/19  -BCC, left forearm, curettage, 07/12/18  -ASP, left mid helical rim, 5-FU, 71/53/66  -SCC, left anterior thigh, curettage, 01/18/18  -BCC, left temple, excision, 10/24/17  -BCC, left posterior ankle, 5-FU, 10/24/17  -SCC, right lower leg, curettage, 04/17/17  -BCC, left nasal tip, Mohs, 11/03/16  -BCC, right maxillary cheek, Mohs, 09/22/15  -SCC, right posterior calf, Mohs, 09/09/15  -SCCi, left lower leg, Mohs, 12/02/14  -SCC, midback, curettage, 09/15/14  -MMi, left abdomen, excision, 09/02/14    ROS: Constitutional: Negative.     Dermatological : positive for - skin lesion changes      Social History     Socioeconomic History    Marital status:      Spouse name: Not on file    Number of children: Not on file    Years of education: Not on file    Highest education level: Not on file   Occupational History    Not on file   Social Needs    Financial resource strain: Not on file    Food insecurity:     Worry: Not on file     Inability: Not on file    Transportation needs:     Medical: Not on file     Non-medical: Not on file   Tobacco Use    Smoking status: Never Smoker    Smokeless tobacco: Never Used   Substance and Sexual Activity    Alcohol use:  Yes     Alcohol/week: 0.0 standard drinks     Types: 1 - 2 Standard drinks or equivalent per week     Comment: occ, moderatley    Drug use: No    Sexual activity: Yes     Partners: Female     Birth control/protection: None   Lifestyle    Physical activity:     Days per week: Not on file     Minutes per session: Not on file    Stress: Not on file   Relationships    Social connections:     Talks on phone: Not on file     Gets together: Not on file     Attends [employer] service: Not on file     Active member of club or organization: Not on file     Attends meetings of clubs or organizations: Not on file     Relationship status: Not on file    Intimate partner violence:     Fear of current or ex partner: Not on file     Emotionally abused: Not on file     Physically abused: Not on file     Forced sexual activity: Not on file   Other Topics Concern    Not on file   Social History Narrative    Not on file       Family History   Problem Relation Age of Onset    Heart Disease Mother     Heart Disease Father     Arthritis-rheumatoid Neg Hx        Past Medical History:   Diagnosis Date    Arthritis     knees liliya    Asthma     Empyema of lung ([facility] [address] 2/2013    Hypercholesteremia     Hypertension     Hypertrophy of prostate without urinary obstruction and other lower urinary tract symptoms (LUTS)     Skin cancer     Sun-damaged skin     Type II or unspecified type diabetes mellitus without mention of complication, not stated as uncontrolled 2/27/2013       Past Surgical History:   Procedure Laterality Date    HX APPENDECTOMY      when i was a kid    HX HEENT      cataracts removed bilat.  HX KNEE REPLACEMENT  01/25/14    right knee    HX ORTHOPAEDIC  2007    TKR Left    HX OTHER SURGICAL  2/8/2013    LVATS, pulmonary decortication       Current Outpatient Medications   Medication Sig Dispense Refill    albuterol (PROAIR HFA) 90 mcg/actuation inhaler Take 90 mcg by inhalation every four (4) hours as needed.  fluorouracil (EFUDEX) 5 % chemo cream Apply twice daily for 4 weeks to arms, hands and shins 80 g 0    ofloxacin (FLOXIN) 0.3 % ophthalmic solution       budesonide-formoterol (SYMBICORT) 80-4.5 mcg/actuation HFAA       losartan-hydroCHLOROthiazide (HYZAAR) 100-25 mg per tablet Take 1 Tab by mouth.  CHOLECALCIFEROL, VITAMIN D3, (VITAMIN D3 PO) Take  by mouth.  mometasone (NASONEX) 50 mcg/actuation nasal spray 2 Sprays by Both Nostrils route daily. 1 Container 5    albuterol (PROVENTIL HFA) 90 mcg/actuation inhaler Take 2 Puffs by inhalation every four (4) hours as needed for Wheezing or Shortness of Breath. 1 Inhaler 1    losartan-hydrochlorothiazide (HYZAAR) 100-25 mg per tablet Take 1 Tab by mouth daily. 90 Tab 3    celecoxib (CELEBREX) 200 mg capsule Take 1 Cap by mouth as directed. Take 1 tablet once or twice a day as needed for arthritis pain. 180 Cap 3    Blood-Glucose Meter (ONE TOUCH ULTRA SYSTEM KIT) monitoring kit Use as directed daily 1 Kit 0    glucose blood VI test strips (ONE TOUCH ULTRA TEST) strip Monitor BS daily 1 Package 11    Lancets (ONE TOUCH ULTRASOFT LANCETS) Misc Monitor BS daily 1 Package 11    aspirin 81 mg tablet Take 81 mg by mouth.  finasteride (PROSCAR) 5 mg tablet Take 5 mg by mouth daily.       azithromycin (ZITHROMAX) 250 mg tablet Take 250 mg by mouth every Monday, Wednesday, Friday.  diclofenac EC (VOLTAREN) 50 mg EC tablet TAKE 1 TABLET BY MOUTH BY MOUTH 2 TIMES A DAY.  clindamycin (CLEOCIN) 300 mg capsule TAKE 1 CAPSULE THREE TIMES DAILY  0    FLUZONE HIGH-DOSE 2017-18, PF, syrg injection TO BE ADMINISTERED BY PHARMACIST FOR IMMUNIZATION  0    predniSONE (STERAPRED DS) 10 mg dose pack TAKE AS DIRECTED ON PACKAGE  0    ciprofloxacin-dexamethasone (CIPRODEX) 0.3-0.1 % otic suspension       meloxicam (MOBIC) 15 mg tablet TAKE 1 TABLET (15 MG TOTAL) BY MOUTH DAILY.  tiZANidine (ZANAFLEX) 4 mg tablet Take 4 mg by mouth.  simvastatin (ZOCOR) 20 mg tablet TAKE 1 TABLET AT NIGHT      diclofenac EC (VOLTAREN) 50 mg EC tablet TAKE 1 TABLET BY MOUTH BY MOUTH 2 TIMES A DAY. 0    bupivacaine-EPINEPHrine (MARCAINE-EPINEPHRINE) 0.25 %-1:200,000 soln Used for mohs surgery 1.5 mL 0    PREDNISONE by Does Not Apply route.  niacin ER (NIASPAN) 500 mg tablet Take 1 Tab by mouth daily (after dinner). 90 Tab 3    HYDROcodone-homatropine (HYCODAN) 5-1.5 mg/5 mL (5 mL) syrup Take 5 mL by mouth three (3) times daily as needed. 150 mL 0    levofloxacin (LEVAQUIN) 500 mg tablet Take 1 Tab by mouth daily. 10 Tab 0    simvastatin (ZOCOR) 20 mg tablet TAKE 1 TABLET AT NIGHT 90 Tab 3    tamsulosin (FLOMAX) 0.4 mg capsule Take 0.4 mg by mouth daily.  alfuzosin (UROXATRAL) 10 mg SR tablet Take 10 mg by mouth daily. Allergies   Allergen Reactions    Pcn [Penicillins] Swelling         Objective:    Visit Vitals  /72 (BP 1 Location: Right arm, BP Patient Position: Sitting)   Pulse 70   Temp 97.5 °F (36.4 °C) (Oral)   Resp 12   Ht 5' 6\" (1.676 m)   Wt 158 lb (71.7 kg)   SpO2 97%   BMI 25.50 kg/m²       Theresa Phillips is a 80 y.o. male who appears well and in no distress. He is awake, alert, and oriented. There is no preauricular, submandibular, or cervical lymphadenopathy.   A skin examination was performed including his scalp, face (including eyelids), ears, neck, chest, back, abdomen, upper extremities (including digits/nails), lower extremities, breasts, buttocks; genital skin was not examined. There is a well healed melanoma scar on the right shoulder and left abdomen without evidence of pigment nodularity or lesion recurrence. He has multiple well healed scars without evidence of lesion recurrence. He has scattered waxy macules and keratotic papules consistent with seborrheic keratoses. He has pink intradermal nevi and brown junctional nevi, no concerning features for severe atypia. He has thin scaled actinic keratoses on the left helical rim, left cheek x 2, left temple, left forearm, left shin, right cheek, right forearm, right shin x 2,  right hand x 2, and the left hand. He has 1 mm white papule on the right cheek most consistent with a milia cyst. There is pink skin change on the left medial buttock without mass or specific skin lesion. Assessment/Plan:  1. Personal history of melanoma in situ and nonmelanoma skin cancer. I discussed sun protection, sunscreen use, the warning signs of skin cancer, the need for self-skin examinations, and the need for regular practitioner exams. The patient should follow up sooner as needed if new, changing, or symptomatic skin lesions arise. 2. Seborrheic keratoses. The diagnosis was reviewed and the patient was reassured that no treatment is needed for these benign lesions. 3. Normal nevi. The diagnosis of normal nevi was reviewed. I discussed sun protection, sunscreen use, the warning signs of skin cancer, mole monitoring, the need for self-skin examinations, and the need for regular practitioner exams. The patient should follow up sooner as needed if new, changing, or symptomatic skin lesions arise. 4. Actinic Keratoses. The diagnosis of this precancerous lesion related to sun exposure was reviewed. Verbal consent was obtained.   I treated 13 lesions with cryotherapy and post-cryotherapy care was reviewed. 5. Milia. The diagnosis was discussed. The patient was reassured that no treatment is necessary at this time. 6. Skin irritation left buttock. Patient doesn't know the name of his cream that was prescribed but reports improvement. After the visit concluded - I researched his chart and found that he was prescribed Triamcinolone / Nystatin by Dr. Domenico Zelaya. May be able to cease that medication at this time and use barrier cream at this point. I will discuss this when he calls me back as he was to do so in order to give me the prescription name. Next skin exam:  6 months        This plan was reviewed with the patient and patient agrees. All questions were answered. This scribe documentation was reviewed by me and accurately reflects the examination and decisions made by me. [facility]   OFFICE PROCEDURE PROGRESS NOTE   Chart reviewed for the following:   Hood WATSON, have reviewed the History, Physical and updated the Allergic reactions for Eleni Jones. TIME OUT performed immediately prior to start of procedure:   IJosette, have performed the following reviews on Eleni Jones   prior to the start of the procedure:     * Patient was identified by name and date of birth   * Agreement on procedure being performed was verified   * Risks and Benefits explained to the patient   * Procedure site verified and marked as necessary   * Patient was positioned for comfort   * Consent was signed and verified     Time: 10:20 AM  Date of procedure: 9/25/2019  Procedure performed by: Jaye Taylor.  Kit Wilkins DNP  Provider assisted by: self  Patient assisted by: self   How tolerated by patient: tolerated the procedure well with no complications   Comments: none

## 2019-10-07 ENCOUNTER — HOSPITAL ENCOUNTER (OUTPATIENT)
Dept: CT IMAGING | Age: 84
Discharge: HOME | End: 2019-10-07
Attending: INTERNAL MEDICINE
Payer: MEDICARE

## 2019-10-07 DIAGNOSIS — J47.9 BRONCHIECTASIS (HCC): ICD-10-CM

## 2019-10-07 PROCEDURE — 71250 CT THORAX DX C-: CPT

## 2019-10-14 ENCOUNTER — TELEPHONE (OUTPATIENT)
Dept: DERMATOLOGY | Facility: AMBULATORY SURGERY CENTER | Age: 84
End: 2019-10-14

## 2019-10-14 NOTE — TELEPHONE ENCOUNTER
Spoke w/ wife - rash on buttocks is a bit better but now has a new \"white\" spot on the left side. Will come on Friday.

## 2020-04-06 ENCOUNTER — HOSPITAL ENCOUNTER (OUTPATIENT)
Dept: CT IMAGING | Age: 85
Discharge: HOME | End: 2020-04-06
Attending: INTERNAL MEDICINE
Payer: MEDICARE

## 2020-04-06 DIAGNOSIS — J47.9 BRONCHIECTASIS (HCC): ICD-10-CM

## 2020-04-06 PROCEDURE — 71250 CT THORAX DX C-: CPT

## 2020-09-21 ENCOUNTER — HOSPITAL ENCOUNTER (OUTPATIENT)
Dept: CT IMAGING | Age: 85
Discharge: HOME | End: 2020-09-21
Attending: PAIN MEDICINE
Payer: MEDICARE

## 2020-09-21 DIAGNOSIS — M54.17 RADICULOPATHY, LUMBOSACRAL REGION: ICD-10-CM

## 2020-09-21 PROCEDURE — 72131 CT LUMBAR SPINE W/O DYE: CPT

## 2021-07-22 ENCOUNTER — HOSPITAL ENCOUNTER (OUTPATIENT)
Dept: PHYSICAL THERAPY | Age: 86
Discharge: HOME | End: 2021-07-22
Payer: MEDICARE

## 2021-07-22 PROCEDURE — 97161 PT EVAL LOW COMPLEX 20 MIN: CPT | Performed by: PHYSICAL THERAPIST

## 2021-07-22 PROCEDURE — 97110 THERAPEUTIC EXERCISES: CPT | Performed by: PHYSICAL THERAPIST

## 2021-07-22 NOTE — PROGRESS NOTES
PT INITIAL EVALUATION NOTE - [facility] 2-15    Patient Name: Mily Reaves  Date:2021  : 3/26/1933  [x]  Patient  Verified  Payor: [place] MEDICARE / Plan: VA MEDICARE PART A & B / Product Type: Medicare /    In time: 3:30 PM  Out time: 4:15 PM  Total Treatment Time (min): 45  Total Timed Codes (min): 10  1:1 Treatment Time ( only): 10  Visit #: 1     Treatment Area: Pain in left hip [M25.552]    SUBJECTIVE  Pain Level (0-10 scale): 4/10  At worst: 6/10, pain is increased by twisting to the left, laying on his left side, going up and down stairs  At best: 1/10, pain is increased by laying down  Any medication changes, allergies to medications, adverse drug reactions, diagnosis change, or new procedure performed?: [] No    [x] Yes (see summary sheet for update)  Subjective:    Pt c/o L hip pain. L hip pain has been present for 2 years. Pain started when he was shaping some bushes 2 years ago. It has not Pt has not had PT for his hip. Pt has been getting injections in his left hip. Pt has had 5 injections but it didn't help. Pt does not take anything for pain  MRI revealed lumbar stenosis and degenerative changes  MRI of R hip revealed labral tear, bilateral OA  Pt denies numbness/tingling  Pt reports his steps are short when he is walking  Pain walking 50 yards to his mailbox  Pt worries about falling. PLOF: pt enjoys fishing, yard work, pt works with [employer], wood work  Mechanism of Injury: insidious onset  Previous Treatment/Compliance: none  PMHx/Surgical Hx: Stenosis, labral tear, B TKR > 5 years ago  Work Hx: Retired  Living Situation: Pt lives in a one story home with 1 step in the garage  Pt Goals:  \"Be able to walk and get my strength back to  and do things\"  Barriers: chronic nature of condition  Motivation: good  Substance use: none reported  Cognition: A & O x 3        OBJECTIVE/EXAMINATION  Posture:  L shoulder higher  Gait and Functional Mobility:  Decreased R heel strike, decreased arm swing, pt able to t/f without UE assist        Lumbar AROM:        R  L   Flexion    10 inches         Extension   NT        Side Bending   TO knee p!  2 inches above knee p!         Rotation   15 inches p! 15 inches            LOWER QUARTER   MUSCLE STRENGTH  KEY       R  L  0 - No Contraction  L1, L2 Psoas  5  5    1 - Trace   L3 Quads  5  5    2 - Poor   L4 Tib Ant  4  5    3 - Fair    L5 EHL  5  5    4 - Good   S1 FHL  5  5    5 - Normal   S2 Hams  5  4         Special Tests:       H.S. 90/90 test: lacking 30 deg B    SLR: 60 deg B   SLS R 1 s L 3 s    Modality rationale: decrease pain and increase tissue extensibility to improve the patients ability to sit, stand, transfer, ambulate, lift, carry, reach, complete ADLs   Min Type Additional Details    [] Estim: []Att   []Unatt        []TENS instruct                  []IFC  []Premod   []NMES                     []Other:  []w/US   []w/ice   []w/heat  Position:  Location:    []  Traction: [] Cervical       []Lumbar                       [] Prone          []Supine                       []Intermittent   []Continuous Lbs:  [] before manual  [] after manual  []w/heat    []  Ultrasound: []Continuous   [] Pulsed at:                           []1MHz   []3MHz Location:  W/cm2:    [] Paraffin         Location:   []w/heat   10 []  Ice     [x]  Heat  []  Ice massage Position: supine  Location:    []  Laser  []  Other: Position:  Location:      []  Vasopneumatic Device Pressure:       [] lo [] med [] hi   Temperature:      [x] Skin assessment post-treatment:  [x]intact []redness- no adverse reaction    []redness  adverse reaction:     10 min Therapeutic Exercise:  [x] See flow sheet :   Rationale: increase ROM, increase strength and improve coordination to improve the patients ability to sit, stand, transfer, ambulate, lift, carry, reach, complete ADLs        With   [x] TE   [] TA   [] Neuro   [] SC   [] other: Patient Education: [x] Review HEP    [] Progressed/Changed HEP based on:   [x] positioning   [x] body mechanics   [] transfers   [x] heat/ice application    [] other:      Other Objective/Functional Measures: FOTO Functional Measure: 49/100              moderate           Pain Level (0-10 scale) post treatment: 0    ASSESSMENT/Changes in Function:     [x]  See Plan of [facility] [address] [place] [address], PT 7/22/2021

## 2021-07-22 NOTE — PROGRESS NOTES
Physical Therapy at [facility],   a part of  [facility]  [place] [address] [facility], [address]  Phone: [phone]  Fax: [phone]    Plan of Care/Statement of Necessity for Physical Therapy Services  2-15    Patient name: Wale Obregon  : 3/26/1933  Provider#: [id number]  Referral source: Lee Perez MD      Medical/Treatment Diagnosis: Pain in left hip [M25.552]     Prior Hospitalization: see medical history     Comorbidities: Stenosis, labral tear, B TKR > 5 years ago  Prior Level of Function: pt enjoys [email or web address], yard work, pt works with [employer], wood work  Medications: Verified on Patient Summary List  Start of Care: 21      Onset Date: 2 years ago   The Plan of Care and following information is based on the information from the initial evaluation. Assessment/ key information: The patient presents with pain in his left hip which affects his ability to stand, walk, and twist.  The patient's condition is complicated by presents of L lumbar stenosis and B hip OA. The patient demonstrates L HS weakness and R DF weakness. The patient demonstrates limited lumbar AROM and limited HS flexibility. He demonstrates poor static balance as indicated by inability to hold SLS >3 seconds B. The patient would benefit form outpatient PT to improve balance for decreased risk of falls and to restore flexibility for decreased pain in the him and improved QOL.     Evaluation Complexity History HIGH Complexity :3+ comorbidities / personal factors will impact the outcome/ POC ; Examination HIGH Complexity : 4+ Standardized tests and measures addressing body structure, function, activity limitation and / or participation in recreation  ;Presentation LOW Complexity : Stable, uncomplicated  ;Clinical Decision Making MEDIUM Complexity : FOTO score of 26-74  Overall Complexity Rating: LOW     Problem List: pain affecting function, decrease ROM, decrease strength, edema affecting function, impaired gait/ balance, decrease ADL/ functional abilitiies, decrease activity tolerance, decrease flexibility/ joint mobility and decrease transfer abilities   Treatment Plan may include any combination of the following: Therapeutic exercise, Therapeutic activities, Neuromuscular re-education, Physical agent/modality, Gait/balance training, Manual therapy, Patient education, Self Care training, Functional mobility training, Home safety training and Stair training  Patient / Family readiness to learn indicated by: asking questions, trying to perform skills and interest  Persons(s) to be included in education: patient (P)  Barriers to Learning/Limitations: None  Patient Goal (s): \"Be able to walk and get my strength back to  and do things\"  Patient Self Reported Health Status: fair  Rehabilitation Potential: good    Short Term Goals: To be accomplished in 4 weeks:  1) The patient will be independent with introductory HEP  2) The patient will improve lumbar flexion AROM to 6 inches from floor to improve ease with dressing  3) The patient will walk to his mailbox and back without an increase in pain    Long Term Goals: To be accomplished in 12 weeks:  1) The patient will hold SLS 10 s or greater without LOB to decrease risk of falls  2) The patient will demonstrate pain free lumbar AROM WFL multi-directionally to improve ease with household chores  3) The patient will report ability to sleep through the night without an increase in pain  Frequency / Duration: Patient to be seen 2 times per week for 12 weeks.     Patient/ Caregiver education and instruction: self care, activity modification and exercises    [x]  Plan of care has been reviewed with PTA        Certification Period: 7/22/21-10/22/21  Loretta Carmichael, PT 7/22/2021     ________________________________________________________________________    I certify that the above Therapy Services are being furnished while the patient is under my care. I agree with the treatment plan and certify that this therapy is necessary.     Physician's Signature:____________________  Date:____________Time: _________         Kayleigh Lockett MD

## 2021-07-28 ENCOUNTER — HOSPITAL ENCOUNTER (OUTPATIENT)
Dept: PHYSICAL THERAPY | Age: 86
Discharge: HOME | End: 2021-07-28
Payer: MEDICARE

## 2021-07-28 PROCEDURE — 97110 THERAPEUTIC EXERCISES: CPT | Performed by: PHYSICAL THERAPIST

## 2021-07-28 NOTE — PROGRESS NOTES
PT DAILY TREATMENT NOTE - [facility] 2-15    Patient Name: Kayla Begum  Date:2021  : 3/26/1933  [x]  Patient  Verified  Payor: VA MEDICARE / Plan: VA MEDICARE PART A & B / Product Type: Medicare /    In time:10:35 AM  Out time:11:40 AM  Total Treatment Time (min): 65  Total Timed Codes (min): 55  1:1 Treatment Time ( [address] only): 54   Visit #:  2    Treatment Area: Pain in left hip [M25.552]    SUBJECTIVE  Pain Level (0-10 scale):0/10  Any medication changes, allergies to medications, adverse drug reactions, diagnosis change, or new procedure performed?: [x] No    [] Yes (see summary sheet for update)  Subjective functional status/changes:   [] No changes reported  Patient reports that he feels worse since his last visit and believes that it is his HEP that is aggravating the hip pain. The pain is not constant, but is sharp at the lateral hip when goes up/down stairs and makes sharp turns. OBJECTIVE              Modality rationale: decrease pain and increase tissue extensibility to improve the patients ability to sit, stand, transfer, ambulate, lift, carry, reach, complete ADLs    Min Type Additional Details     []? Estim: []? Att   []? Unatt        []? TENS instruct                  []?IFC  []? Premod   []? NMES                     []?Other:  []?w/US   []?w/ice   []?w/heat  Position:  Location:     []? Traction: []? Cervical       []? Lumbar                       []? Prone          []? Supine                       []?Intermittent   []? Continuous Lbs:  []? before manual  []? after manual  []?w/heat     []? Ultrasound: []? Continuous   []? Pulsed at:                           []? 1MHz   []? 3MHz Location:  W/cm2:     []? Paraffin         Location:   []?w/heat   10 []? Ice     [x]? Heat  []? Ice massage Position: supine  Location:     []? Laser  []? Other: Position:  Location:        []? Vasopneumatic Device Pressure:       []? lo []? med []? hi   Temperature:       [x]?  Skin assessment post-treatment: [x]?intact []? redness- no adverse reaction    []? redness  adverse reaction:      55 min Therapeutic Exercise:  [x]? See flow sheet :   Rationale: increase ROM, increase strength and improve coordination to improve the patients ability to sit, stand, transfer, ambulate, lift, carry, reach, complete ADLs    With   [] TE   [] TA   [] Neuro   [] SC   [] other: Patient Education: [x] Review HEP    [] Progressed/Changed HEP based on:   [] positioning   [] body mechanics   [] transfers   [] heat/ice application    [] other:      Other Objective/Functional Measures:   No increase in pain with all new ther ex     Pain Level (0-10 scale) post treatment: 0    ASSESSMENT/Changes in Function:   Progression of therapeutic exercises was tolerated well. No significant change yet in functional status. Patient will continue to benefit from skilled PT services to modify and progress therapeutic interventions, address functional mobility deficits, address ROM deficits, address strength deficits, analyze and address soft tissue restrictions, analyze and cue movement patterns, analyze and modify body mechanics/ergonomics and assess and modify postural abnormalities to attain remaining goals. []  See Plan of Care  []  See progress note/recertification  []  See Discharge Summary         Progress towards goals / Updated goals:  No significant progress yet towards short term goals.     PLAN  [x]  Upgrade activities as tolerated     [x]  Continue plan of care  []  Update interventions per flow sheet       []  Discharge due to:_  []  Other:_      Dino Mondragon, PT 7/28/2021

## 2021-08-04 ENCOUNTER — HOSPITAL ENCOUNTER (OUTPATIENT)
Dept: PHYSICAL THERAPY | Age: 86
Discharge: HOME | End: 2021-08-04
Payer: MEDICARE

## 2021-08-04 PROCEDURE — 97110 THERAPEUTIC EXERCISES: CPT | Performed by: PHYSICAL THERAPIST

## 2021-08-04 NOTE — PROGRESS NOTES
PT DAILY TREATMENT NOTE - [facility] 2-15    Patient Name: Katie Blackwood  Date:2021  : 3/26/1933  [x]  Patient  Verified  Payor: [place] MEDICARE / Plan: [place] MEDICARE PART A & B / Product Type: Medicare /    In time:10:20 AM  Out time:11:15 AM  Total Treatment Time (min): 55  Total Timed Codes (min): 55  1:1 Treatment Time ( [address] only): 54   Visit #:  3    Treatment Area: Pain in left hip [M25.552]    SUBJECTIVE  Pain Level (0-10 scale):0/10  Any medication changes, allergies to medications, adverse drug reactions, diagnosis change, or new procedure performed?: [x] No    [] Yes (see summary sheet for update)  Subjective functional status/changes:   [] No changes reported  Patient reports no significant pain today and is feeling much better compared to his last visit. OBJECTIVE              Modality rationale: Patient declined    Min Type Additional Details     []? Estim: []? Att   []? Unatt        []? TENS instruct                  []?IFC  []? Premod   []? NMES                     []?Other:  []?w/US   []?w/ice   []?w/heat  Position:  Location:     []? Traction: []? Cervical       []? Lumbar                       []? Prone          []? Supine                       []?Intermittent   []? Continuous Lbs:  []? before manual  []? after manual  []?w/heat     []? Ultrasound: []? Continuous   []? Pulsed at:                           []? 1MHz   []? 3MHz Location:  W/cm2:     []? Paraffin         Location:   []?w/heat    []? Ice     []? Heat  []? Ice massage Position: supine  Location:     []? Laser  []? Other: Position:  Location:        []? Vasopneumatic Device Pressure:       []? lo []? med []? hi   Temperature:       [x]? Skin assessment post-treatment:  [x]? intact []? redness- no adverse reaction    []? redness  adverse reaction:      55 min Therapeutic Exercise:  [x]?  See flow sheet :   Rationale: increase ROM, increase strength and improve coordination to improve the patients ability to sit, stand, transfer, ambulate, lift, carry, reach, complete ADLs    With   [] TE   [] TA   [] Neuro   [] SC   [] other: Patient Education: [x] Review HEP    [] Progressed/Changed HEP based on:   [] positioning   [] body mechanics   [] transfers   [] heat/ice application    [] other:      Other Objective/Functional Measures:       Pain Level (0-10 scale) post treatment: 0    ASSESSMENT/Changes in Function:   Patient has shown excellent improvement in lumbar spine mobility since his last visit. Patient will continue to benefit from skilled PT services to modify and progress therapeutic interventions, address functional mobility deficits, address ROM deficits, address strength deficits, analyze and address soft tissue restrictions, analyze and cue movement patterns, analyze and modify body mechanics/ergonomics and assess and modify postural abnormalities to attain remaining goals. []  See Plan of Care  []  See progress note/recertification  []  See Discharge Summary         Progress towards goals / Updated goals:  Good initial progress towards short term goals on today's date.     PLAN  [x]  Upgrade activities as tolerated     [x]  Continue plan of care  []  Update interventions per flow sheet       []  Discharge due to:_  []  Other:_      Suri England, PT 8/4/2021

## 2021-08-09 ENCOUNTER — HOSPITAL ENCOUNTER (OUTPATIENT)
Dept: PHYSICAL THERAPY | Age: 86
Discharge: HOME | End: 2021-08-09
Payer: MEDICARE

## 2021-08-09 PROCEDURE — 97110 THERAPEUTIC EXERCISES: CPT | Performed by: PHYSICAL THERAPIST

## 2021-08-09 NOTE — PROGRESS NOTES
PT DAILY TREATMENT NOTE - [facility] 2-15    Patient Name: Kayla Begum  Date:2021  : 3/26/1933  [x]  Patient  Verified  Payor: [place] MEDICARE / Plan: [place] MEDICARE PART A & B / Product Type: Medicare /    In time:10:05 AM  Out time:11:00 AM  Total Treatment Time (min): 55  Total Timed Codes (min): 55  1:1 Treatment Time ([facility] only): 54   Visit #:  4    Treatment Area: Pain in left hip [M25.552]    SUBJECTIVE  Pain Level (0-10 scale):0/10  Any medication changes, allergies to medications, adverse drug reactions, diagnosis change, or new procedure performed?: [x] No    [] Yes (see summary sheet for update)  Subjective functional status/changes:   [] No changes reported  Patient reports continued improvement at his back, however he is having more difficulty walking on uneven surfaces. OBJECTIVE              Modality rationale: Patient declined    Min Type Additional Details     []? Estim: []? Att   []? Unatt        []? TENS instruct                  []?IFC  []? Premod   []? NMES                     []?Other:  []?w/US   []?w/ice   []?w/heat  Position:  Location:     []? Traction: []? Cervical       []? Lumbar                       []? Prone          []? Supine                       []?Intermittent   []? Continuous Lbs:  []? before manual  []? after manual  []?w/heat     []? Ultrasound: []? Continuous   []? Pulsed at:                           []? 1MHz   []? 3MHz Location:  W/cm2:     []? Paraffin         Location:   []?w/heat    []? Ice     []? Heat  []? Ice massage Position: supine  Location:     []? Laser  []? Other: Position:  Location:        []? Vasopneumatic Device Pressure:       []? lo []? med []? hi   Temperature:       [x]? Skin assessment post-treatment:  [x]? intact []? redness- no adverse reaction    []? redness  adverse reaction:      55 min Therapeutic Exercise:  [x]?  See flow sheet :   Rationale: increase ROM, increase strength and improve coordination to improve the patients ability to sit, stand, transfer, ambulate, lift, carry, reach, complete ADLs    With   [] TE   [] TA   [] Neuro   [] SC   [] other: Patient Education: [x] Review HEP    [] Progressed/Changed HEP based on:   [] positioning   [] body mechanics   [] transfers   [] heat/ice application    [] other:      Other Objective/Functional Measures:   Tandem stance:    R: 20   L: 15 sec    Pain Level (0-10 scale) post treatment: 0    ASSESSMENT/Changes in Function:   Patient will continue to benefit from skilled PT services to modify and progress therapeutic interventions, address functional mobility deficits, address ROM deficits, address strength deficits, analyze and address soft tissue restrictions, analyze and cue movement patterns, analyze and modify body mechanics/ergonomics and assess and modify postural abnormalities to attain remaining goals. []  See Plan of Care  []  See progress note/recertification  []  See Discharge Summary         Progress towards goals / Updated goals:  Continued progress towards short term goals. Will emphasize increasing focus on balance and glut med strength. PLAN  [x]  Upgrade activities as tolerated     [x]  Continue plan of care  []  Update interventions per flow sheet       []  Discharge due to:_  []  Other:_      Tracy Rubinstein, PT , DPT, OCS, Cert.  DN   8/9/2021

## 2021-08-11 ENCOUNTER — HOSPITAL ENCOUNTER (OUTPATIENT)
Dept: PHYSICAL THERAPY | Age: 86
Discharge: HOME | End: 2021-08-11
Payer: MEDICARE

## 2021-08-11 PROCEDURE — 97110 THERAPEUTIC EXERCISES: CPT | Performed by: PHYSICAL THERAPIST

## 2021-08-11 NOTE — PROGRESS NOTES
PT DAILY TREATMENT NOTE - [facility] 2-15    Patient Name: Charle Essex  Date:2021  : 3/26/1933  [x]  Patient  Verified  Payor: [place] MEDICARE / Plan: [place] MEDICARE PART A & B / Product Type: Medicare /    In time:9:55 AM  Out time:10:50 AM  Total Treatment Time (min): 55  Total Timed Codes (min): 55  1:1 Treatment Time ( [address] only): 54   Visit #:  5    Treatment Area: Pain in left hip [M25.552]    SUBJECTIVE  Pain Level (0-10 scale):0/10  Any medication changes, allergies to medications, adverse drug reactions, diagnosis change, or new procedure performed?: [x] No    [] Yes (see summary sheet for update)  Subjective functional status/changes:   [] No changes reported  Patient reports continued progress with his hip strength and has no pain today. OBJECTIVE              Modality rationale: Patient declined    Min Type Additional Details     []? Estim: []? Att   []? Unatt        []? TENS instruct                  []?IFC  []? Premod   []? NMES                     []?Other:  []?w/US   []?w/ice   []?w/heat  Position:  Location:     []? Traction: []? Cervical       []? Lumbar                       []? Prone          []? Supine                       []?Intermittent   []? Continuous Lbs:  []? before manual  []? after manual  []?w/heat     []? Ultrasound: []? Continuous   []? Pulsed at:                           []? 1MHz   []? 3MHz Location:  W/cm2:     []? Paraffin         Location:   []?w/heat    []? Ice     []? Heat  []? Ice massage Position: supine  Location:     []? Laser  []? Other: Position:  Location:        []? Vasopneumatic Device Pressure:       []? lo []? med []? hi   Temperature:       [x]? Skin assessment post-treatment:  [x]? intact []? redness- no adverse reaction    []? redness  adverse reaction:      55 min Therapeutic Exercise:  [x]?  See flow sheet :   Rationale: increase ROM, increase strength and improve coordination to improve the patients ability to sit, stand, transfer, ambulate, lift, carry, reach, complete ADLs    With   [] TE   [] TA   [] Neuro   [] SC   [] other: Patient Education: [x] Review HEP    [] Progressed/Changed HEP based on:   [] positioning   [] body mechanics   [] transfers   [] heat/ice application    [] other:      Other Objective/Functional Measures:       Pain Level (0-10 scale) post treatment: 0    ASSESSMENT/Changes in Function:   Patient continues to tolerate a steady progression of therapeutic exercises well and is showing excellent improvement in glut med strength. Patient will continue to benefit from skilled PT services to modify and progress therapeutic interventions, address functional mobility deficits, address ROM deficits, address strength deficits, analyze and address soft tissue restrictions, analyze and cue movement patterns, analyze and modify body mechanics/ergonomics and assess and modify postural abnormalities to attain remaining goals. []  See Plan of Care  []  See progress note/recertification  []  See Discharge Summary         Progress towards goals / Updated goals:  Steady progress towards short term goals. PLAN  [x]  Upgrade activities as tolerated     [x]  Continue plan of care  []  Update interventions per flow sheet       []  Discharge due to:_  []  Other:_      Raven Lowe, PT , DPT, OCS, Cert.  DN   8/11/2021

## 2021-08-16 ENCOUNTER — TRANSCRIBE ORDER (OUTPATIENT)
Dept: SCHEDULING | Age: 86
End: 2021-08-16

## 2021-08-16 ENCOUNTER — HOSPITAL ENCOUNTER (OUTPATIENT)
Dept: PHYSICAL THERAPY | Age: 86
Discharge: HOME | End: 2021-08-16
Payer: MEDICARE

## 2021-08-16 DIAGNOSIS — J47.9 BRONCHIECTASIS (HCC): Primary | ICD-10-CM

## 2021-08-16 PROCEDURE — 97110 THERAPEUTIC EXERCISES: CPT | Performed by: PHYSICAL THERAPIST

## 2021-08-16 PROCEDURE — 97112 NEUROMUSCULAR REEDUCATION: CPT | Performed by: PHYSICAL THERAPIST

## 2021-08-16 NOTE — PROGRESS NOTES
PT DAILY TREATMENT NOTE - [facility] 2-15    Patient Name: Molly Zacarias  Date:2021  : 3/26/1933  [x]  Patient  Verified  Payor: [place] MEDICARE / Plan: [place] MEDICARE PART A & B / Product Type: Medicare /    In time:10:00 AM  Out time:10:55 AM  Total Treatment Time (min): 55  Total Timed Codes (min): 55  1:1 Treatment Time ( [address] only): 54   Visit #:  6    Treatment Area: Pain in left hip [M25.552]    SUBJECTIVE  Pain Level (0-10 scale):0/10  Any medication changes, allergies to medications, adverse drug reactions, diagnosis change, or new procedure performed?: [x] No    [] Yes (see summary sheet for update)  Subjective functional status/changes:   [] No changes reported  Patient continues to have difficulty with his balance and walking, but his low back is much better. OBJECTIVE              Modality rationale: Patient declined    Min Type Additional Details     []? Estim: []? Att   []? Unatt        []? TENS instruct                  []?IFC  []? Premod   []? NMES                     []?Other:  []?w/US   []?w/ice   []?w/heat  Position:  Location:     []? Traction: []? Cervical       []? Lumbar                       []? Prone          []? Supine                       []?Intermittent   []? Continuous Lbs:  []? before manual  []? after manual  []?w/heat     []? Ultrasound: []? Continuous   []? Pulsed at:                           []? 1MHz   []? 3MHz Location:  W/cm2:     []? Paraffin         Location:   []?w/heat    []? Ice     []? Heat  []? Ice massage Position: supine  Location:     []? Laser  []? Other: Position:  Location:        []? Vasopneumatic Device Pressure:       []? lo []? med []? hi   Temperature:       [x]? Skin assessment post-treatment:  [x]? intact []? redness- no adverse reaction    []? redness - adverse reaction:      40 min Therapeutic Exercise:  [x]?  See flow sheet :   Rationale: increase ROM, increase strength and improve coordination to improve the patients ability to sit, stand, transfer, ambulate, lift, carry, reach, complete ADLs    15 min Neuromuscular Re-education:  [x]? See flow sheet :   Rationale: increase proprioception and improve coordination to improve the patients ability to sit, stand, transfer, ambulate, lift, carry, reach, complete ADLs    With   [] TE   [] TA   [] Neuro   [] SC   [] other: Patient Education: [x] Review HEP    [] Progressed/Changed HEP based on:   [] positioning   [] body mechanics   [] transfers   [] heat/ice application    [] other:      Other Objective/Functional Measures:    SLS: 10 sec B    Pain Level (0-10 scale) post treatment: 0    ASSESSMENT/Changes in Function:   Excellent progress so far with dynamic balance. Patient will continue to benefit from skilled PT services to modify and progress therapeutic interventions, address functional mobility deficits, address ROM deficits, address strength deficits, analyze and address soft tissue restrictions, analyze and cue movement patterns, analyze and modify body mechanics/ergonomics and assess and modify postural abnormalities to attain remaining goals. []  See Plan of Care  []  See progress note/recertification  []  See Discharge Summary         Progress towards goals / Updated goals:  Patient is showing good progress towards functional goals. Will assess FOTO next visit. PLAN  [x]  Upgrade activities as tolerated     [x]  Continue plan of care  []  Update interventions per flow sheet       []  Discharge due to:_  []  Other:_      Raven Lowe, PT , DPT, OCS, Cert.  DN   8/16/2021

## 2021-08-18 ENCOUNTER — APPOINTMENT (OUTPATIENT)
Dept: PHYSICAL THERAPY | Age: 86
End: 2021-08-18
Payer: MEDICARE

## 2021-09-28 NOTE — PROGRESS NOTES
Physical Therapy at [facility],   a part of  [facility]  [address] [facility], [address]  Phone: [phone] Fax: [phone]      Discharge Summary 2-15    Patient name: Debbi Camacho  : 3/26/1933  Provider#: [id number]  Referral source: Cuate Narvaez MD      Medical/Treatment Diagnosis: Pain in left hip [M25.552]     Prior Hospitalization: see medical history     Comorbidities: See Plan of Care  Prior Level of Function: See Plan of Care  Medications: Verified on Patient Summary List    Start of Care: 21      Onset Date:2 years ago   Visits from Start of Care: 6     Missed Visits: 0  Reporting Period : 21 to 21    Assessment/Summary of care: Mr. Kevin Covarrubias was seen for 6 PT visits over the course of 4 weeks. The first several visits focused on improving lumbar ROM and decreasing pain and then the final several visits focused on balance and gait stability. He achieved all long term goals and no longer requires PT services. Short Term Goals: To be accomplished in 4 weeks:  1) The patient will be independent with introductory HEP Met. 2) The patient will improve lumbar flexion AROM to 6 inches from floor to improve ease with dressing  Met. 3) The patient will walk to his mailbox and back without an increase in pain  Met.     Long Term Goals: To be accomplished in 12 weeks:  1) The patient will hold SLS 10 s or greater without LOB to decrease risk of falls  Met. 2) The patient will demonstrate pain free lumbar AROM WFL multi-directionally to improve ease with household chores   Met. 3) The patient will report ability to sleep through the night without an increase in pain  Met.         RECOMMENDATIONS:  [x]Discontinue therapy: [x]Patient has reached or is progressing toward set goals     []Patient is non-compliant or has abdicated     []Due to lack of appreciable progress towards set goals     []Other  Thao Smith, PT 9/28/2021

## 2021-11-01 ENCOUNTER — HOSPITAL ENCOUNTER (OUTPATIENT)
Dept: CT IMAGING | Age: 86
Discharge: HOME | End: 2021-11-01
Attending: INTERNAL MEDICINE
Payer: MEDICARE

## 2021-11-01 DIAGNOSIS — J47.9 BRONCHIECTASIS (HCC): ICD-10-CM

## 2021-11-01 PROCEDURE — 71250 CT THORAX DX C-: CPT

## 2022-03-30 ENCOUNTER — APPOINTMENT (OUTPATIENT)
Dept: GENERAL RADIOLOGY | Age: 87
DRG: 329 | End: 2022-03-30
Attending: STUDENT IN AN ORGANIZED HEALTH CARE EDUCATION/TRAINING PROGRAM
Payer: MEDICARE

## 2022-03-30 ENCOUNTER — ANESTHESIA (OUTPATIENT)
Dept: SURGERY | Age: 87
DRG: 329 | End: 2022-03-30
Payer: MEDICARE

## 2022-03-30 ENCOUNTER — APPOINTMENT (OUTPATIENT)
Dept: CT IMAGING | Age: 87
DRG: 329 | End: 2022-03-30
Attending: STUDENT IN AN ORGANIZED HEALTH CARE EDUCATION/TRAINING PROGRAM
Payer: MEDICARE

## 2022-03-30 ENCOUNTER — APPOINTMENT (OUTPATIENT)
Dept: GENERAL RADIOLOGY | Age: 87
DRG: 329 | End: 2022-03-30
Attending: SURGERY
Payer: MEDICARE

## 2022-03-30 ENCOUNTER — HOSPITAL ENCOUNTER (INPATIENT)
Age: 87
LOS: 7 days | Discharge: HOME HEALTH | DRG: 329 | End: 2022-04-06
Attending: STUDENT IN AN ORGANIZED HEALTH CARE EDUCATION/TRAINING PROGRAM | Admitting: SURGERY
Payer: MEDICARE

## 2022-03-30 ENCOUNTER — ANESTHESIA EVENT (OUTPATIENT)
Dept: SURGERY | Age: 87
DRG: 329 | End: 2022-03-30
Payer: MEDICARE

## 2022-03-30 DIAGNOSIS — Z98.890 S/P EXPLORATORY LAPAROTOMY: ICD-10-CM

## 2022-03-30 DIAGNOSIS — K57.20 DIVERTICULITIS OF LARGE INTESTINE WITH PERFORATION WITHOUT BLEEDING: Primary | ICD-10-CM

## 2022-03-30 DIAGNOSIS — K66.8 FREE INTRAPERITONEAL AIR: ICD-10-CM

## 2022-03-30 LAB
ALBUMIN SERPL-MCNC: 2.7 G/DL (ref 3.5–5)
ALBUMIN/GLOB SERPL: 0.6 {RATIO} (ref 1.1–2.2)
ALP SERPL-CCNC: 57 U/L (ref 45–117)
ALT SERPL-CCNC: 20 U/L (ref 12–78)
ANION GAP SERPL CALC-SCNC: 7 MMOL/L (ref 5–15)
APPEARANCE UR: CLEAR
AST SERPL-CCNC: 11 U/L (ref 15–37)
BACTERIA URNS QL MICRO: ABNORMAL /HPF
BASOPHILS # BLD: 0 K/UL (ref 0–0.1)
BASOPHILS NFR BLD: 0 % (ref 0–1)
BILIRUB SERPL-MCNC: 0.7 MG/DL (ref 0.2–1)
BILIRUB UR QL: NEGATIVE
BUN SERPL-MCNC: 21 MG/DL (ref 6–20)
BUN/CREAT SERPL: 22 (ref 12–20)
CALCIUM SERPL-MCNC: 9 MG/DL (ref 8.5–10.1)
CHLORIDE SERPL-SCNC: 101 MMOL/L (ref 97–108)
CO2 SERPL-SCNC: 29 MMOL/L (ref 21–32)
COLOR UR: ABNORMAL
COVID-19 RAPID TEST, COVR: DETECTED
CREAT SERPL-MCNC: 0.95 MG/DL (ref 0.7–1.3)
DIFFERENTIAL METHOD BLD: ABNORMAL
EOSINOPHIL # BLD: 0 K/UL (ref 0–0.4)
EOSINOPHIL NFR BLD: 0 % (ref 0–7)
EPITH CASTS URNS QL MICRO: ABNORMAL /LPF
ERYTHROCYTE [DISTWIDTH] IN BLOOD BY AUTOMATED COUNT: 12.3 % (ref 11.5–14.5)
GLOBULIN SER CALC-MCNC: 4.5 G/DL (ref 2–4)
GLUCOSE BLD STRIP.AUTO-MCNC: 179 MG/DL (ref 65–117)
GLUCOSE SERPL-MCNC: 187 MG/DL (ref 65–100)
GLUCOSE UR STRIP.AUTO-MCNC: NEGATIVE MG/DL
HCT VFR BLD AUTO: 38.1 % (ref 36.6–50.3)
HGB BLD-MCNC: 12.2 G/DL (ref 12.1–17)
HGB UR QL STRIP: ABNORMAL
IMM GRANULOCYTES # BLD AUTO: 0.1 K/UL (ref 0–0.04)
IMM GRANULOCYTES NFR BLD AUTO: 1 % (ref 0–0.5)
KETONES UR QL STRIP.AUTO: NEGATIVE MG/DL
LACTATE SERPL-SCNC: 1.8 MMOL/L (ref 0.4–2)
LEUKOCYTE ESTERASE UR QL STRIP.AUTO: NEGATIVE
LIPASE SERPL-CCNC: 71 U/L (ref 73–393)
LYMPHOCYTES # BLD: 1.1 K/UL (ref 0.8–3.5)
LYMPHOCYTES NFR BLD: 9 % (ref 12–49)
MCH RBC QN AUTO: 28.3 PG (ref 26–34)
MCHC RBC AUTO-ENTMCNC: 32 G/DL (ref 30–36.5)
MCV RBC AUTO: 88.4 FL (ref 80–99)
MONOCYTES # BLD: 0.6 K/UL (ref 0–1)
MONOCYTES NFR BLD: 5 % (ref 5–13)
NEUTS SEG # BLD: 10.8 K/UL (ref 1.8–8)
NEUTS SEG NFR BLD: 86 % (ref 32–75)
NITRITE UR QL STRIP.AUTO: NEGATIVE
NRBC # BLD: 0 K/UL (ref 0–0.01)
NRBC BLD-RTO: 0 PER 100 WBC
PH UR STRIP: 6 [PH] (ref 5–8)
PLATELET # BLD AUTO: 259 K/UL (ref 150–400)
PMV BLD AUTO: 9.6 FL (ref 8.9–12.9)
POTASSIUM SERPL-SCNC: 4 MMOL/L (ref 3.5–5.1)
PROT SERPL-MCNC: 7.2 G/DL (ref 6.4–8.2)
PROT UR STRIP-MCNC: 30 MG/DL
RBC # BLD AUTO: 4.31 M/UL (ref 4.1–5.7)
RBC #/AREA URNS HPF: ABNORMAL /HPF (ref 0–5)
SERVICE CMNT-IMP: ABNORMAL
SODIUM SERPL-SCNC: 137 MMOL/L (ref 136–145)
SOURCE, COVRS: ABNORMAL
SP GR UR REFRACTOMETRY: 1.02 (ref 1–1.03)
UR CULT HOLD, URHOLD: NORMAL
UROBILINOGEN UR QL STRIP.AUTO: 0.2 EU/DL (ref 0.2–1)
WBC # BLD AUTO: 12.6 K/UL (ref 4.1–11.1)
WBC URNS QL MICRO: ABNORMAL /HPF (ref 0–4)

## 2022-03-30 PROCEDURE — 77030010507 HC ADH SKN DERMBND J&J -B: Performed by: SURGERY

## 2022-03-30 PROCEDURE — 85025 COMPLETE CBC W/AUTO DIFF WBC: CPT

## 2022-03-30 PROCEDURE — 77030026102 HC DEV TISS ENSEAL G2 J&J -F: Performed by: SURGERY

## 2022-03-30 PROCEDURE — 77030013079 HC BLNKT BAIR HGGR 3M -A: Performed by: NURSE ANESTHETIST, CERTIFIED REGISTERED

## 2022-03-30 PROCEDURE — 36415 COLL VENOUS BLD VENIPUNCTURE: CPT

## 2022-03-30 PROCEDURE — 77030002986 HC SUT PROL J&J -A: Performed by: SURGERY

## 2022-03-30 PROCEDURE — 81001 URINALYSIS AUTO W/SCOPE: CPT

## 2022-03-30 PROCEDURE — 77010033678 HC OXYGEN DAILY

## 2022-03-30 PROCEDURE — 80053 COMPREHEN METABOLIC PANEL: CPT

## 2022-03-30 PROCEDURE — 77030040922 HC BLNKT HYPOTHRM STRY -A

## 2022-03-30 PROCEDURE — 77030002916 HC SUT ETHLN J&J -A: Performed by: SURGERY

## 2022-03-30 PROCEDURE — 74011250636 HC RX REV CODE- 250/636: Performed by: STUDENT IN AN ORGANIZED HEALTH CARE EDUCATION/TRAINING PROGRAM

## 2022-03-30 PROCEDURE — 74011250636 HC RX REV CODE- 250/636: Performed by: NURSE ANESTHETIST, CERTIFIED REGISTERED

## 2022-03-30 PROCEDURE — 74011250636 HC RX REV CODE- 250/636: Performed by: ANESTHESIOLOGY

## 2022-03-30 PROCEDURE — 74011000258 HC RX REV CODE- 258: Performed by: SURGERY

## 2022-03-30 PROCEDURE — 87040 BLOOD CULTURE FOR BACTERIA: CPT

## 2022-03-30 PROCEDURE — 77030018809 HC RETRCTR ALXSO DISP AMR -B: Performed by: SURGERY

## 2022-03-30 PROCEDURE — 96368 THER/DIAG CONCURRENT INF: CPT

## 2022-03-30 PROCEDURE — 74011250637 HC RX REV CODE- 250/637: Performed by: SURGERY

## 2022-03-30 PROCEDURE — 77030019940 HC BLNKT HYPOTHRM STRY -B: Performed by: SURGERY

## 2022-03-30 PROCEDURE — 71045 X-RAY EXAM CHEST 1 VIEW: CPT

## 2022-03-30 PROCEDURE — 96376 TX/PRO/DX INJ SAME DRUG ADON: CPT

## 2022-03-30 PROCEDURE — 77030040506 HC DRN WND MDII -A: Performed by: SURGERY

## 2022-03-30 PROCEDURE — 74018 RADEX ABDOMEN 1 VIEW: CPT

## 2022-03-30 PROCEDURE — 96375 TX/PRO/DX INJ NEW DRUG ADDON: CPT

## 2022-03-30 PROCEDURE — 77030036998 HC STPLR CRV CUT CNTOUR J&J -F: Performed by: SURGERY

## 2022-03-30 PROCEDURE — 77030008684 HC TU ET CUF COVD -B: Performed by: NURSE ANESTHETIST, CERTIFIED REGISTERED

## 2022-03-30 PROCEDURE — 77030027876 HC STPLR ENDOSC FLX PWR J&J -G1: Performed by: SURGERY

## 2022-03-30 PROCEDURE — 74011000250 HC RX REV CODE- 250: Performed by: NURSE ANESTHETIST, CERTIFIED REGISTERED

## 2022-03-30 PROCEDURE — 77030009968 HC RELD STPLR ENDOSC J&J -D: Performed by: SURGERY

## 2022-03-30 PROCEDURE — 93005 ELECTROCARDIOGRAM TRACING: CPT

## 2022-03-30 PROCEDURE — 82962 GLUCOSE BLOOD TEST: CPT

## 2022-03-30 PROCEDURE — 77030031139 HC SUT VCRL2 J&J -A: Performed by: SURGERY

## 2022-03-30 PROCEDURE — 76010000132 HC OR TIME 2.5 TO 3 HR: Performed by: SURGERY

## 2022-03-30 PROCEDURE — 77030002966 HC SUT PDS J&J -A: Performed by: SURGERY

## 2022-03-30 PROCEDURE — 77030018673: Performed by: SURGERY

## 2022-03-30 PROCEDURE — 74011250636 HC RX REV CODE- 250/636: Performed by: SURGERY

## 2022-03-30 PROCEDURE — 74176 CT ABD & PELVIS W/O CONTRAST: CPT

## 2022-03-30 PROCEDURE — 94762 N-INVAS EAR/PLS OXIMTRY CONT: CPT

## 2022-03-30 PROCEDURE — 88307 TISSUE EXAM BY PATHOLOGIST: CPT

## 2022-03-30 PROCEDURE — 77030002996 HC SUT SLK J&J -A: Performed by: SURGERY

## 2022-03-30 PROCEDURE — 65610000006 HC RM INTENSIVE CARE

## 2022-03-30 PROCEDURE — 51702 INSERT TEMP BLADDER CATH: CPT

## 2022-03-30 PROCEDURE — 74011000250 HC RX REV CODE- 250: Performed by: STUDENT IN AN ORGANIZED HEALTH CARE EDUCATION/TRAINING PROGRAM

## 2022-03-30 PROCEDURE — 76210000006 HC OR PH I REC 0.5 TO 1 HR: Performed by: SURGERY

## 2022-03-30 PROCEDURE — 77030040361 HC SLV COMPR DVT MDII -B

## 2022-03-30 PROCEDURE — 74011000250 HC RX REV CODE- 250: Performed by: ANESTHESIOLOGY

## 2022-03-30 PROCEDURE — 77030026438 HC STYL ET INTUB CARD -A: Performed by: NURSE ANESTHETIST, CERTIFIED REGISTERED

## 2022-03-30 PROCEDURE — 76060000036 HC ANESTHESIA 2.5 TO 3 HR: Performed by: SURGERY

## 2022-03-30 PROCEDURE — 77030008462 HC STPLR SKN PROX J&J -A: Performed by: SURGERY

## 2022-03-30 PROCEDURE — 99285 EMERGENCY DEPT VISIT HI MDM: CPT

## 2022-03-30 PROCEDURE — 87635 SARS-COV-2 COVID-19 AMP PRB: CPT

## 2022-03-30 PROCEDURE — 77030040504 HC DRN WND MDII -B: Performed by: SURGERY

## 2022-03-30 PROCEDURE — 94640 AIRWAY INHALATION TREATMENT: CPT

## 2022-03-30 PROCEDURE — 0DBN0ZZ EXCISION OF SIGMOID COLON, OPEN APPROACH: ICD-10-PCS | Performed by: SURGERY

## 2022-03-30 PROCEDURE — 77030040923 HC STPLR ENDOSC ECHELON J&J -E: Performed by: SURGERY

## 2022-03-30 PROCEDURE — 77030005513 HC CATH URETH FOL11 MDII -B: Performed by: SURGERY

## 2022-03-30 PROCEDURE — 2709999900 HC NON-CHARGEABLE SUPPLY: Performed by: SURGERY

## 2022-03-30 PROCEDURE — 83605 ASSAY OF LACTIC ACID: CPT

## 2022-03-30 PROCEDURE — 96365 THER/PROPH/DIAG IV INF INIT: CPT

## 2022-03-30 PROCEDURE — 83690 ASSAY OF LIPASE: CPT

## 2022-03-30 PROCEDURE — C9290 INJ, BUPIVACAINE LIPOSOME: HCPCS | Performed by: SURGERY

## 2022-03-30 RX ORDER — FINASTERIDE 5 MG/1
5 TABLET, FILM COATED ORAL DAILY
Status: DISCONTINUED | OUTPATIENT
Start: 2022-03-30 | End: 2022-04-06 | Stop reason: HOSPADM

## 2022-03-30 RX ORDER — ONDANSETRON 2 MG/ML
4 INJECTION INTRAMUSCULAR; INTRAVENOUS
Status: COMPLETED | OUTPATIENT
Start: 2022-03-30 | End: 2022-03-30

## 2022-03-30 RX ORDER — SODIUM CHLORIDE, SODIUM LACTATE, POTASSIUM CHLORIDE, CALCIUM CHLORIDE 600; 310; 30; 20 MG/100ML; MG/100ML; MG/100ML; MG/100ML
75 INJECTION, SOLUTION INTRAVENOUS CONTINUOUS
Status: DISCONTINUED | OUTPATIENT
Start: 2022-03-30 | End: 2022-04-04

## 2022-03-30 RX ORDER — OXYCODONE HYDROCHLORIDE 5 MG/1
5 TABLET ORAL AS NEEDED
Status: DISCONTINUED | OUTPATIENT
Start: 2022-03-30 | End: 2022-03-30

## 2022-03-30 RX ORDER — SODIUM CHLORIDE 0.9 % (FLUSH) 0.9 %
5-40 SYRINGE (ML) INJECTION AS NEEDED
Status: DISCONTINUED | OUTPATIENT
Start: 2022-03-30 | End: 2022-03-30 | Stop reason: HOSPADM

## 2022-03-30 RX ORDER — ONDANSETRON 2 MG/ML
INJECTION INTRAMUSCULAR; INTRAVENOUS AS NEEDED
Status: DISCONTINUED | OUTPATIENT
Start: 2022-03-30 | End: 2022-03-30 | Stop reason: HOSPADM

## 2022-03-30 RX ORDER — CEFAZOLIN SODIUM 1 G/3ML
INJECTION, POWDER, FOR SOLUTION INTRAMUSCULAR; INTRAVENOUS AS NEEDED
Status: DISCONTINUED | OUTPATIENT
Start: 2022-03-30 | End: 2022-03-30 | Stop reason: HOSPADM

## 2022-03-30 RX ORDER — LIDOCAINE HYDROCHLORIDE 10 MG/ML
0.1 INJECTION, SOLUTION EPIDURAL; INFILTRATION; INTRACAUDAL; PERINEURAL AS NEEDED
Status: DISCONTINUED | OUTPATIENT
Start: 2022-03-30 | End: 2022-03-30 | Stop reason: HOSPADM

## 2022-03-30 RX ORDER — LOSARTAN POTASSIUM 50 MG/1
100 TABLET ORAL DAILY
Status: DISCONTINUED | OUTPATIENT
Start: 2022-03-30 | End: 2022-04-06 | Stop reason: HOSPADM

## 2022-03-30 RX ORDER — MORPHINE SULFATE 1 MG/ML
2 INJECTION, SOLUTION EPIDURAL; INTRATHECAL; INTRAVENOUS
Status: DISCONTINUED | OUTPATIENT
Start: 2022-03-30 | End: 2022-03-30

## 2022-03-30 RX ORDER — METRONIDAZOLE 500 MG/100ML
500 INJECTION, SOLUTION INTRAVENOUS EVERY 12 HOURS
Status: DISCONTINUED | OUTPATIENT
Start: 2022-03-30 | End: 2022-04-04

## 2022-03-30 RX ORDER — METRONIDAZOLE 500 MG/100ML
500 INJECTION, SOLUTION INTRAVENOUS EVERY 8 HOURS
Status: DISCONTINUED | OUTPATIENT
Start: 2022-03-30 | End: 2022-03-30

## 2022-03-30 RX ORDER — ACETAMINOPHEN 325 MG/1
650 TABLET ORAL EVERY 6 HOURS
Status: DISCONTINUED | OUTPATIENT
Start: 2022-03-30 | End: 2022-04-06 | Stop reason: HOSPADM

## 2022-03-30 RX ORDER — ALBUTEROL SULFATE 90 UG/1
2 AEROSOL, METERED RESPIRATORY (INHALATION)
Status: DISCONTINUED | OUTPATIENT
Start: 2022-03-30 | End: 2022-04-06 | Stop reason: HOSPADM

## 2022-03-30 RX ORDER — LIDOCAINE HYDROCHLORIDE 20 MG/ML
JELLY TOPICAL ONCE
Status: COMPLETED | OUTPATIENT
Start: 2022-03-30 | End: 2022-03-30

## 2022-03-30 RX ORDER — LOSARTAN POTASSIUM AND HYDROCHLOROTHIAZIDE 25; 100 MG/1; MG/1
1 TABLET ORAL DAILY
Status: DISCONTINUED | OUTPATIENT
Start: 2022-03-30 | End: 2022-03-30

## 2022-03-30 RX ORDER — SUCCINYLCHOLINE CHLORIDE 20 MG/ML
INJECTION INTRAMUSCULAR; INTRAVENOUS AS NEEDED
Status: DISCONTINUED | OUTPATIENT
Start: 2022-03-30 | End: 2022-03-30 | Stop reason: HOSPADM

## 2022-03-30 RX ORDER — MORPHINE SULFATE 4 MG/ML
4 INJECTION INTRAVENOUS ONCE
Status: COMPLETED | OUTPATIENT
Start: 2022-03-30 | End: 2022-03-30

## 2022-03-30 RX ORDER — ALBUTEROL SULFATE 90 UG/1
2 AEROSOL, METERED RESPIRATORY (INHALATION)
Status: DISCONTINUED | OUTPATIENT
Start: 2022-03-30 | End: 2022-03-30

## 2022-03-30 RX ORDER — FLUMAZENIL 0.1 MG/ML
0.2 INJECTION INTRAVENOUS
Status: DISCONTINUED | OUTPATIENT
Start: 2022-03-30 | End: 2022-03-30 | Stop reason: HOSPADM

## 2022-03-30 RX ORDER — ALBUTEROL SULFATE 90 UG/1
1 AEROSOL, METERED RESPIRATORY (INHALATION)
Status: DISCONTINUED | OUTPATIENT
Start: 2022-03-30 | End: 2022-03-30 | Stop reason: SDUPTHER

## 2022-03-30 RX ORDER — BUDESONIDE AND FORMOTEROL FUMARATE DIHYDRATE 80; 4.5 UG/1; UG/1
2 AEROSOL RESPIRATORY (INHALATION)
Status: DISCONTINUED | OUTPATIENT
Start: 2022-03-30 | End: 2022-04-06 | Stop reason: HOSPADM

## 2022-03-30 RX ORDER — SODIUM CHLORIDE 0.9 % (FLUSH) 0.9 %
5-40 SYRINGE (ML) INJECTION EVERY 8 HOURS
Status: DISCONTINUED | OUTPATIENT
Start: 2022-03-30 | End: 2022-03-30 | Stop reason: HOSPADM

## 2022-03-30 RX ORDER — DIPHENHYDRAMINE HYDROCHLORIDE 50 MG/ML
12.5 INJECTION, SOLUTION INTRAMUSCULAR; INTRAVENOUS AS NEEDED
Status: DISCONTINUED | OUTPATIENT
Start: 2022-03-30 | End: 2022-03-30

## 2022-03-30 RX ORDER — DEXAMETHASONE SODIUM PHOSPHATE 4 MG/ML
INJECTION, SOLUTION INTRA-ARTICULAR; INTRALESIONAL; INTRAMUSCULAR; INTRAVENOUS; SOFT TISSUE AS NEEDED
Status: DISCONTINUED | OUTPATIENT
Start: 2022-03-30 | End: 2022-03-30 | Stop reason: HOSPADM

## 2022-03-30 RX ORDER — SODIUM CHLORIDE 0.9 % (FLUSH) 0.9 %
5-40 SYRINGE (ML) INJECTION AS NEEDED
Status: DISCONTINUED | OUTPATIENT
Start: 2022-03-30 | End: 2022-03-30

## 2022-03-30 RX ORDER — SODIUM CHLORIDE, SODIUM LACTATE, POTASSIUM CHLORIDE, CALCIUM CHLORIDE 600; 310; 30; 20 MG/100ML; MG/100ML; MG/100ML; MG/100ML
75 INJECTION, SOLUTION INTRAVENOUS CONTINUOUS
Status: DISCONTINUED | OUTPATIENT
Start: 2022-03-30 | End: 2022-03-30 | Stop reason: HOSPADM

## 2022-03-30 RX ORDER — NALOXONE HYDROCHLORIDE 0.4 MG/ML
0.2 INJECTION, SOLUTION INTRAMUSCULAR; INTRAVENOUS; SUBCUTANEOUS
Status: DISCONTINUED | OUTPATIENT
Start: 2022-03-30 | End: 2022-03-30 | Stop reason: HOSPADM

## 2022-03-30 RX ORDER — PROPOFOL 10 MG/ML
INJECTION, EMULSION INTRAVENOUS AS NEEDED
Status: DISCONTINUED | OUTPATIENT
Start: 2022-03-30 | End: 2022-03-30 | Stop reason: HOSPADM

## 2022-03-30 RX ORDER — NEOSTIGMINE METHYLSULFATE 1 MG/ML
INJECTION, SOLUTION INTRAVENOUS AS NEEDED
Status: DISCONTINUED | OUTPATIENT
Start: 2022-03-30 | End: 2022-03-30 | Stop reason: HOSPADM

## 2022-03-30 RX ORDER — FENTANYL CITRATE 50 UG/ML
INJECTION, SOLUTION INTRAMUSCULAR; INTRAVENOUS AS NEEDED
Status: DISCONTINUED | OUTPATIENT
Start: 2022-03-30 | End: 2022-03-30 | Stop reason: HOSPADM

## 2022-03-30 RX ORDER — LEVOFLOXACIN 5 MG/ML
750 INJECTION, SOLUTION INTRAVENOUS EVERY 24 HOURS
Status: DISCONTINUED | OUTPATIENT
Start: 2022-03-30 | End: 2022-04-04

## 2022-03-30 RX ORDER — TAMSULOSIN HYDROCHLORIDE 0.4 MG/1
0.4 CAPSULE ORAL DAILY
Status: DISCONTINUED | OUTPATIENT
Start: 2022-03-30 | End: 2022-04-06 | Stop reason: HOSPADM

## 2022-03-30 RX ORDER — MORPHINE SULFATE 2 MG/ML
2 INJECTION, SOLUTION INTRAMUSCULAR; INTRAVENOUS
Status: DISCONTINUED | OUTPATIENT
Start: 2022-03-30 | End: 2022-04-06 | Stop reason: HOSPADM

## 2022-03-30 RX ORDER — GLYCOPYRROLATE 0.2 MG/ML
INJECTION INTRAMUSCULAR; INTRAVENOUS AS NEEDED
Status: DISCONTINUED | OUTPATIENT
Start: 2022-03-30 | End: 2022-03-30 | Stop reason: HOSPADM

## 2022-03-30 RX ORDER — SODIUM CHLORIDE 0.9 % (FLUSH) 0.9 %
5-40 SYRINGE (ML) INJECTION EVERY 8 HOURS
Status: DISCONTINUED | OUTPATIENT
Start: 2022-03-30 | End: 2022-04-06 | Stop reason: HOSPADM

## 2022-03-30 RX ORDER — ROCURONIUM BROMIDE 10 MG/ML
INJECTION, SOLUTION INTRAVENOUS AS NEEDED
Status: DISCONTINUED | OUTPATIENT
Start: 2022-03-30 | End: 2022-03-30 | Stop reason: HOSPADM

## 2022-03-30 RX ORDER — ALFUZOSIN HYDROCHLORIDE 10 MG/1
10 TABLET, EXTENDED RELEASE ORAL DAILY
Status: DISCONTINUED | OUTPATIENT
Start: 2022-03-30 | End: 2022-03-30 | Stop reason: CLARIF

## 2022-03-30 RX ORDER — ONDANSETRON 2 MG/ML
4 INJECTION INTRAMUSCULAR; INTRAVENOUS AS NEEDED
Status: DISCONTINUED | OUTPATIENT
Start: 2022-03-30 | End: 2022-03-30

## 2022-03-30 RX ORDER — OXYCODONE HYDROCHLORIDE 5 MG/1
5 TABLET ORAL
Status: DISCONTINUED | OUTPATIENT
Start: 2022-03-30 | End: 2022-04-06 | Stop reason: HOSPADM

## 2022-03-30 RX ORDER — PHENYLEPHRINE HYDROCHLORIDE 10 MG/ML
INJECTION INTRAVENOUS AS NEEDED
Status: DISCONTINUED | OUTPATIENT
Start: 2022-03-30 | End: 2022-03-30 | Stop reason: HOSPADM

## 2022-03-30 RX ORDER — HYDROMORPHONE HYDROCHLORIDE 1 MG/ML
.25-1 INJECTION, SOLUTION INTRAMUSCULAR; INTRAVENOUS; SUBCUTANEOUS
Status: DISCONTINUED | OUTPATIENT
Start: 2022-03-30 | End: 2022-03-30

## 2022-03-30 RX ADMIN — ACETAMINOPHEN 650 MG: 325 TABLET ORAL at 11:58

## 2022-03-30 RX ADMIN — SODIUM CHLORIDE, POTASSIUM CHLORIDE, SODIUM LACTATE AND CALCIUM CHLORIDE: 600; 310; 30; 20 INJECTION, SOLUTION INTRAVENOUS at 07:39

## 2022-03-30 RX ADMIN — FINASTERIDE 5 MG: 5 TABLET, FILM COATED ORAL at 11:58

## 2022-03-30 RX ADMIN — CEFAZOLIN SODIUM 2 G: 1 POWDER, FOR SOLUTION INTRAMUSCULAR; INTRAVENOUS at 07:11

## 2022-03-30 RX ADMIN — SUCCINYLCHOLINE CHLORIDE 100 MG: 20 INJECTION, SOLUTION INTRAMUSCULAR; INTRAVENOUS at 07:04

## 2022-03-30 RX ADMIN — BUDESONIDE AND FORMOTEROL FUMARATE DIHYDRATE 2 PUFF: 80; 4.5 AEROSOL RESPIRATORY (INHALATION) at 11:12

## 2022-03-30 RX ADMIN — ROCURONIUM BROMIDE 10 MG: 10 INJECTION INTRAVENOUS at 07:56

## 2022-03-30 RX ADMIN — LEVOFLOXACIN 750 MG: 5 INJECTION, SOLUTION INTRAVENOUS at 02:56

## 2022-03-30 RX ADMIN — Medication 10 ML: at 14:53

## 2022-03-30 RX ADMIN — Medication 10 ML: at 21:00

## 2022-03-30 RX ADMIN — MORPHINE SULFATE 4 MG: 4 INJECTION INTRAVENOUS at 01:35

## 2022-03-30 RX ADMIN — SODIUM CHLORIDE, POTASSIUM CHLORIDE, SODIUM LACTATE AND CALCIUM CHLORIDE 100 ML/HR: 600; 310; 30; 20 INJECTION, SOLUTION INTRAVENOUS at 11:59

## 2022-03-30 RX ADMIN — METRONIDAZOLE 500 MG: 500 INJECTION, SOLUTION INTRAVENOUS at 14:53

## 2022-03-30 RX ADMIN — GLYCOPYRROLATE 0.4 MG: 0.2 INJECTION INTRAMUSCULAR; INTRAVENOUS at 08:59

## 2022-03-30 RX ADMIN — BUDESONIDE AND FORMOTEROL FUMARATE DIHYDRATE 2 PUFF: 80; 4.5 AEROSOL RESPIRATORY (INHALATION) at 20:58

## 2022-03-30 RX ADMIN — FENTANYL CITRATE 25 MCG: 50 INJECTION, SOLUTION INTRAMUSCULAR; INTRAVENOUS at 09:14

## 2022-03-30 RX ADMIN — SUGAMMADEX 160 MG: 100 INJECTION, SOLUTION INTRAVENOUS at 09:11

## 2022-03-30 RX ADMIN — LIDOCAINE HYDROCHLORIDE: 20 JELLY TOPICAL at 01:20

## 2022-03-30 RX ADMIN — FENTANYL CITRATE 50 MCG: 50 INJECTION, SOLUTION INTRAMUSCULAR; INTRAVENOUS at 06:58

## 2022-03-30 RX ADMIN — PHENYLEPHRINE HYDROCHLORIDE 160 MCG: 10 INJECTION INTRAVENOUS at 07:13

## 2022-03-30 RX ADMIN — FENTANYL CITRATE 25 MCG: 50 INJECTION, SOLUTION INTRAMUSCULAR; INTRAVENOUS at 09:19

## 2022-03-30 RX ADMIN — METRONIDAZOLE 500 MG: 500 INJECTION, SOLUTION INTRAVENOUS at 02:56

## 2022-03-30 RX ADMIN — ROCURONIUM BROMIDE 10 MG: 10 INJECTION INTRAVENOUS at 07:21

## 2022-03-30 RX ADMIN — Medication 3 MG: at 08:59

## 2022-03-30 RX ADMIN — PHENYLEPHRINE HYDROCHLORIDE 160 MCG: 10 INJECTION INTRAVENOUS at 09:07

## 2022-03-30 RX ADMIN — ONDANSETRON 4 MG: 2 INJECTION INTRAMUSCULAR; INTRAVENOUS at 01:35

## 2022-03-30 RX ADMIN — PHENYLEPHRINE HYDROCHLORIDE 80 MCG: 10 INJECTION INTRAVENOUS at 08:48

## 2022-03-30 RX ADMIN — ROCURONIUM BROMIDE 40 MG: 10 INJECTION INTRAVENOUS at 07:06

## 2022-03-30 RX ADMIN — SODIUM CHLORIDE, POTASSIUM CHLORIDE, SODIUM LACTATE AND CALCIUM CHLORIDE: 600; 310; 30; 20 INJECTION, SOLUTION INTRAVENOUS at 09:31

## 2022-03-30 RX ADMIN — Medication 10 ML: at 11:59

## 2022-03-30 RX ADMIN — SODIUM CHLORIDE, POTASSIUM CHLORIDE, SODIUM LACTATE AND CALCIUM CHLORIDE 1000 ML: 600; 310; 30; 20 INJECTION, SOLUTION INTRAVENOUS at 02:56

## 2022-03-30 RX ADMIN — FENTANYL CITRATE 50 MCG: 50 INJECTION, SOLUTION INTRAMUSCULAR; INTRAVENOUS at 06:54

## 2022-03-30 RX ADMIN — ONDANSETRON HYDROCHLORIDE 4 MG: 2 SOLUTION INTRAMUSCULAR; INTRAVENOUS at 08:46

## 2022-03-30 RX ADMIN — MORPHINE SULFATE 4 MG: 4 INJECTION INTRAVENOUS at 03:20

## 2022-03-30 RX ADMIN — FENTANYL CITRATE 50 MCG: 50 INJECTION, SOLUTION INTRAMUSCULAR; INTRAVENOUS at 07:03

## 2022-03-30 RX ADMIN — FENTANYL CITRATE 100 MCG: 50 INJECTION, SOLUTION INTRAMUSCULAR; INTRAVENOUS at 07:32

## 2022-03-30 RX ADMIN — PROPOFOL 150 MG: 10 INJECTION, EMULSION INTRAVENOUS at 07:04

## 2022-03-30 RX ADMIN — ACETAMINOPHEN 650 MG: 325 TABLET ORAL at 23:45

## 2022-03-30 RX ADMIN — FENTANYL CITRATE 25 MCG: 50 INJECTION, SOLUTION INTRAMUSCULAR; INTRAVENOUS at 09:36

## 2022-03-30 RX ADMIN — FENTANYL CITRATE 25 MCG: 50 INJECTION, SOLUTION INTRAMUSCULAR; INTRAVENOUS at 09:02

## 2022-03-30 RX ADMIN — ACETAMINOPHEN 650 MG: 325 TABLET ORAL at 17:25

## 2022-03-30 RX ADMIN — TAMSULOSIN HYDROCHLORIDE 0.4 MG: 0.4 CAPSULE ORAL at 11:58

## 2022-03-30 RX ADMIN — ROCURONIUM BROMIDE 5 MG: 10 INJECTION INTRAVENOUS at 08:39

## 2022-03-30 RX ADMIN — SODIUM CHLORIDE, POTASSIUM CHLORIDE, SODIUM LACTATE AND CALCIUM CHLORIDE 100 ML/HR: 600; 310; 30; 20 INJECTION, SOLUTION INTRAVENOUS at 21:13

## 2022-03-30 RX ADMIN — DEXAMETHASONE SODIUM PHOSPHATE 8 MG: 4 INJECTION, SOLUTION INTRAMUSCULAR; INTRAVENOUS at 07:49

## 2022-03-30 RX ADMIN — SODIUM CHLORIDE, POTASSIUM CHLORIDE, SODIUM LACTATE AND CALCIUM CHLORIDE: 600; 310; 30; 20 INJECTION, SOLUTION INTRAVENOUS at 06:28

## 2022-03-30 NOTE — ED PROVIDER NOTES
Romayne Doom is a 80 y.o. male with past medical history notable for bronchiectasis, hyperlipidemia, asthma, history of empyema, hypertension, hypercholesterolemia, lower urinary tract symptoms, BPH, impaired fasting glucose, recently diagnosed with COVID-19 3/22/2022 presenting with abdominal pain over the past evening. He states that he was diagnosed with Covid after feeling generally unwell for several days. This was over 9 days ago. He is continued to have cough. Since late this past evening he has had constant, gradually worsening severe abdominal discomfort which he feels diffusely, most tender in the bilateral lower quadrants which started when he was laying in bed. Denies vomiting, nausea, diarrhea. Last bowel movement was a several days ago. He has had intermittent fevers since nasra Covid including this morning. He was febrile this morning. He was having some difficulty urinating this evening. He is required a Roth catheter in the past for urinary retention. He has had no previous abdominal surgeries. Past Medical History:   Diagnosis Date    Arthritis     knees liliya    Asthma     Empyema of lung ([address] 2/2013    Hypercholesteremia     Hypertension     Hypertrophy of prostate without urinary obstruction and other lower urinary tract symptoms (LUTS)     Skin cancer     Sun-damaged skin     Type II or unspecified type diabetes mellitus without mention of complication, not stated as uncontrolled 2/27/2013       Past Surgical History:   Procedure Laterality Date    HX APPENDECTOMY      when i was a kid    HX HEENT      cataracts removed bilat.     HX KNEE REPLACEMENT  01/25/14    right knee    HX ORTHOPAEDIC  2007    TKR Left    HX OTHER SURGICAL  2/8/2013    LVATS, pulmonary decortication         Family History:   Problem Relation Age of Onset    Heart Disease Mother     Heart Disease Father     Arthritis-rheumatoid Neg Hx        Social History     Socioeconomic History    Marital status:      Spouse name: Not on file    Number of children: Not on file    Years of education: Not on file    Highest education level: Not on file   Occupational History    Not on file   Tobacco Use    Smoking status: Never Smoker    Smokeless tobacco: Never Used   Substance and Sexual Activity    Alcohol use: Yes     Alcohol/week: 0.0 standard drinks     Types: 1 - 2 Standard drinks or equivalent per week     Comment: occ, moderatley    Drug use: No    Sexual activity: Yes     Partners: Female     Birth control/protection: None   Other Topics Concern    Not on file   Social History Narrative    Not on file     Social Determinants of Health     Financial Resource Strain:     Difficulty of Paying Living Expenses: Not on file   Food Insecurity:     Worried About Running Out of Food in the Last Year: Not on file    Payal of Food in the Last Year: Not on file   Transportation Needs:     Lack of Transportation (Medical): Not on file    Lack of Transportation (Non-Medical):  Not on file   Physical Activity:     Days of Exercise per Week: Not on file    Minutes of Exercise per Session: Not on file   Stress:     Feeling of Stress : Not on file   Social Connections:     Frequency of Communication with Friends and Family: Not on file    Frequency of Social Gatherings with Friends and Family: Not on file    Attends Hoahaoism Services: Not on file    Active Member of [address] [place] [address] or Organizations: Not on file    Attends Club or Organization Meetings: Not on file    Marital Status: Not on file   Intimate Partner Violence:     Fear of Current or Ex-Partner: Not on file    Emotionally Abused: Not on file    Physically Abused: Not on file    Sexually Abused: Not on file   Housing Stability:     Unable to Pay for Housing in the Last Year: Not on file    Number of [place] in the Last Year: Not on file    Unstable Housing in the Last Year: Not on file         ALLERGIES: Pcn [penicillins]    Review of Systems   Constitutional: Positive for fatigue. Negative for chills and fever. HENT: Negative for ear pain, sore throat and trouble swallowing. Eyes: Negative for visual disturbance. Respiratory: Negative for cough and shortness of breath. Cardiovascular: Negative for chest pain. Gastrointestinal: Positive for abdominal pain. Genitourinary: Positive for difficulty urinating. Negative for dysuria, penile discharge and penile swelling. Musculoskeletal: Negative for back pain. Skin: Negative for rash. Neurological: Negative for dizziness, light-headedness and headaches. Psychiatric/Behavioral: Negative for confusion. All other systems reviewed and are negative. Vitals:    03/30/22 0042 03/30/22 0043 03/30/22 0219 03/30/22 0309   BP:  (!) 180/71 (!) 147/66 128/61   Pulse:  72 91 99   Resp:  16 16    Temp:  98.8 °F (37.1 °C)  (!) 100.7 °F (38.2 °C)   SpO2:  96% 91% 100%   Weight: 69 kg (152 lb 1.9 oz)      Height: 5' 5\" (1.651 m)               Physical Exam  Vitals reviewed. Constitutional:       General: He is not in acute distress. Appearance: He is not toxic-appearing. HENT:      Head: Normocephalic and atraumatic. Mouth/Throat:      Mouth: Mucous membranes are moist.   Eyes:      Extraocular Movements: Extraocular movements intact. Cardiovascular:      Rate and Rhythm: Normal rate and regular rhythm. Heart sounds: Normal heart sounds. Pulmonary:      Effort: Pulmonary effort is normal. No respiratory distress. Breath sounds: Normal breath sounds. Abdominal:      General: Abdomen is protuberant. There is distension. Palpations: Abdomen is soft. Tenderness: There is generalized abdominal tenderness (  Worse in the left lower quadrant) and tenderness in the right upper quadrant. There is no right CVA tenderness, left CVA tenderness, guarding or rebound.    Genitourinary:     Testes: Normal.   Musculoskeletal:      Cervical back: Normal range of motion. Right lower leg: No edema. Left lower leg: No edema. Skin:     Capillary Refill: Capillary refill takes less than 2 seconds. Neurological:      General: No focal deficit present. Mental Status: He is alert and oriented to person, place, and time. Psychiatric:         Mood and Affect: Mood normal.          MDM  Number of Diagnoses or Management Options           MEDICAL DECISION MAKIN y.o. male presents with Abdominal Pain and Positive For Covid-23 80year-old gentleman presented with gradually worsening abdominal pain. He has been hemodynamically stable throughout his stay in the ED. He appears to have a distended abdomen and was only mildly tender throughout without peritoneal signs or given his age this may be less apparent. Will start IV fluids, broad-spectrum antibiotics. He states that he has no allergies although he has a chart documented penicillin allergy. Given his benign appearance initially lactic acid was not obtained but one will be obtained now. Will start broad-spectrum antibiotics, IV fluids, transfer to [facility]. Discussed with on-call surgeon Dr. Magdalena Tamayo as well as the ED physician Dr. Chacha Villafana.   2:50 AM with  LABORATORY TESTS:  Labs Reviewed   COVID-19 RAPID TEST - Abnormal; Notable for the following components:       Result Value    COVID-19 rapid test Detected (*)     All other components within normal limits   URINALYSIS W/MICROSCOPIC - Abnormal; Notable for the following components:    Protein 30 (*)     Blood LARGE (*)     Bacteria 2+ (*)     All other components within normal limits   METABOLIC PANEL, COMPREHENSIVE - Abnormal; Notable for the following components:    Glucose 187 (*)     BUN 21 (*)     BUN/Creatinine ratio 22 (*)     AST (SGOT) 11 (*)     Albumin 2.7 (*)     Globulin 4.5 (*)     A-G Ratio 0.6 (*)     All other components within normal limits   CBC WITH AUTOMATED DIFF - Abnormal; Notable for the following components:    WBC 12.6 (*)     NEUTROPHILS 86 (*)     LYMPHOCYTES 9 (*)     IMMATURE GRANULOCYTES 1 (*)     ABS. NEUTROPHILS 10.8 (*)     ABS. IMM. GRANS. 0.1 (*)     All other components within normal limits   LIPASE - Abnormal; Notable for the following components:    Lipase 71 (*)     All other components within normal limits   URINE CULTURE HOLD SAMPLE   CULTURE, BLOOD, PERIPHERAL   LACTIC ACID   SAMPLES BEING HELD       IMAGING RESULTS:  CT ABD PELV WO CONT   Final Result      1. Pneumoperitoneum likely related to perforated sigmoid diverticulitis, with   two extraluminal collections abutting the sigmoid colon as discussed in detail   above. 2. Bibasilar airspace disease and trace right pleural effusion. 3. Multiple cystic structures both kidneys, likely simple and hemorrhagic cysts. No hydronephrosis. 4. Cholelithiasis. The findings were called to Dr. Soo Sanz on 3/30/2022 at 0 225 by myself.      789. XR CHEST SNGL V   Final Result   Bibasilar airspace disease. MEDICATIONS GIVEN:  Medications   levoFLOXacin (LEVAQUIN) 750 mg in D5W IVPB (0 mg IntraVENous IV Completed 3/30/22 0336)   metroNIDAZOLE (FLAGYL) IVPB premix 500 mg (0 mg IntraVENous IV Completed 3/30/22 0336)   lidocaine (URO-JET/ GLYDO) 2 % jelly ( Urethral Given 3/30/22 0120)   morphine injection 4 mg (4 mg IntraVENous Given 3/30/22 0135)   ondansetron (ZOFRAN) injection 4 mg (4 mg IntraVENous Given 3/30/22 0135)   lactated ringers bolus infusion 1,000 mL (0 mL IntraVENous IV Completed 3/30/22 0326)   morphine injection 4 mg (4 mg IntraVENous Given 3/30/22 0320)       PROGRESS NOTE:   3:12 AM Patient's symptoms have improved mildly after treatment    EKG:  Reviewed     CONSULTS:  General Surgery:   ED       IMPRESSION:  1. Diverticulitis of large intestine with perforation without bleeding    2.  Free intraperitoneal air        PLAN:  - Admit to general surgery    Total critical care time spent exclusive of procedures:  79 minutes    Patricia Dhillon MD          Please note that this dictation was completed with [employer], the computer voice recognition software. Quite often unanticipated grammatical, syntax, homophones, and other interpretive errors are inadvertently transcribed by the computer software. Please disregard these errors. Please excuse any errors that have escaped final proofreading.           Procedures

## 2022-03-30 NOTE — ED TRIAGE NOTES
Abdominal pain and distention since last evening. Tested positive for COVID on Monday due to mild respiratory symptoms.

## 2022-03-30 NOTE — H&P
Surgery History and Physical    Subjective:      Pierre Ramirez is a 80 y.o. male with past medical history notable for bronchiectasis, hyperlipidemia, asthma, history of empyema, hypertension, hypercholesterolemia, lower urinary tract symptoms, BPH (on flomax), impaired fasting glucose, recently diagnosed with COVID-19 3/22/2022 presenting with abdominal pain over the past evening. He states that he was diagnosed with Covid after feeling generally unwell for several days. This was over 9 days ago. He reports he started having abdominal pain yesterday which was not improving, but actually getting worse. Denies any nausea or vomiting, denies any history of constipation. Reports his last colonoscopy was when he was around 86y/o. CT done is consistent with perforated sigmoid, likely from diverticulitis with free air and 2 collections adjacent to sigmoid. His WBC is 12. COVID test is positive    Past Medical History:   Diagnosis Date    Arthritis     knees liliya    Asthma     Empyema of lung ([address] 2/2013    Hypercholesteremia     Hypertension     Hypertrophy of prostate without urinary obstruction and other lower urinary tract symptoms (LUTS)     Skin cancer     Sun-damaged skin     Type II or unspecified type diabetes mellitus without mention of complication, not stated as uncontrolled 2/27/2013     Past Surgical History:   Procedure Laterality Date    HX APPENDECTOMY      when i was a kid    HX HEENT      cataracts removed bilat.     HX KNEE REPLACEMENT  01/25/14    right knee    HX ORTHOPAEDIC  2007    TKR Left    HX OTHER SURGICAL  2/8/2013    LVATS, pulmonary decortication      Family History   Problem Relation Age of Onset    Heart Disease Mother     Heart Disease Father     Arthritis-rheumatoid Neg Hx      Social History     Socioeconomic History    Marital status:    Tobacco Use    Smoking status: Never Smoker    Smokeless tobacco: Never Used   Substance and Sexual Activity    Alcohol use: Yes     Alcohol/week: 0.0 standard drinks     Types: 1 - 2 Standard drinks or equivalent per week     Comment: occ, markatshailesh    Drug use: No    Sexual activity: Yes     Partners: Female     Birth control/protection: None      Current Facility-Administered Medications   Medication Dose Route Frequency    levoFLOXacin (LEVAQUIN) 750 mg in D5W IVPB  750 mg IntraVENous Q24H    metroNIDAZOLE (FLAGYL) IVPB premix 500 mg  500 mg IntraVENous Q12H     Current Outpatient Medications   Medication Sig    mometasone (NASONEX) 50 mcg/actuation nasal spray 2 Sprays by Both Nostrils route daily.  albuterol (PROVENTIL HFA) 90 mcg/actuation inhaler Take 2 Puffs by inhalation every four (4) hours as needed for Wheezing or Shortness of Breath.  losartan-hydrochlorothiazide (HYZAAR) 100-25 mg per tablet Take 1 Tab by mouth daily.  azithromycin (ZITHROMAX) 250 mg tablet Take 250 mg by mouth every Monday, Wednesday, Friday.  albuterol (PROAIR HFA) 90 mcg/actuation inhaler Take 90 mcg by inhalation every four (4) hours as needed.  fluorouracil (EFUDEX) 5 % chemo cream Apply twice daily for 4 weeks to arms, hands and shins    diclofenac EC (VOLTAREN) 50 mg EC tablet TAKE 1 TABLET BY MOUTH BY MOUTH 2 TIMES A DAY.  clindamycin (CLEOCIN) 300 mg capsule TAKE 1 CAPSULE THREE TIMES DAILY    FLUZONE HIGH-DOSE 2017-18, PF, syrg injection TO BE ADMINISTERED BY PHARMACIST FOR IMMUNIZATION    predniSONE (STERAPRED DS) 10 mg dose pack TAKE AS DIRECTED ON PACKAGE    ciprofloxacin-dexamethasone (CIPRODEX) 0.3-0.1 % otic suspension     meloxicam (MOBIC) 15 mg tablet TAKE 1 TABLET (15 MG TOTAL) BY MOUTH DAILY.  ofloxacin (FLOXIN) 0.3 % ophthalmic solution     budesonide-formoterol (SYMBICORT) 80-4.5 mcg/actuation HFAA     tiZANidine (ZANAFLEX) 4 mg tablet Take 4 mg by mouth.  losartan-hydroCHLOROthiazide (HYZAAR) 100-25 mg per tablet Take 1 Tab by mouth.     simvastatin (ZOCOR) 20 mg tablet TAKE 1 TABLET AT NIGHT    diclofenac EC (VOLTAREN) 50 mg EC tablet TAKE 1 TABLET BY MOUTH BY MOUTH 2 TIMES A DAY.  CHOLECALCIFEROL, VITAMIN D3, (VITAMIN D3 PO) Take  by mouth.  PREDNISONE by Does Not Apply route.  niacin ER (NIASPAN) 500 mg tablet Take 1 Tab by mouth daily (after dinner).  HYDROcodone-homatropine (HYCODAN) 5-1.5 mg/5 mL (5 mL) syrup Take 5 mL by mouth three (3) times daily as needed.  levofloxacin (LEVAQUIN) 500 mg tablet Take 1 Tab by mouth daily.  tamsulosin (FLOMAX) 0.4 mg capsule Take 0.4 mg by mouth daily.  celecoxib (CELEBREX) 200 mg capsule Take 1 Cap by mouth as directed. Take 1 tablet once or twice a day as needed for arthritis pain.  Blood-Glucose Meter (ONE TOUCH ULTRA SYSTEM KIT) monitoring kit Use as directed daily    glucose blood VI test strips (ONE TOUCH ULTRA TEST) strip Monitor BS daily    Lancets (ONE TOUCH ULTRASOFT LANCETS) Misc Monitor BS daily    aspirin 81 mg tablet Take 81 mg by mouth.  finasteride (PROSCAR) 5 mg tablet Take 5 mg by mouth daily.  alfuzosin (UROXATRAL) 10 mg SR tablet Take 10 mg by mouth daily.       Allergies   Allergen Reactions    Pcn [Penicillins] Swelling       Review of Systems:REVIEW OF SYSTEMS:     []     Unable to obtain  ROS due to  []    mental status change  []    sedated   []    intubated   [x]    Total of 12 systems reviewed as follows:    Constitutional: neg for fevers, chills, weight loss, malaise  Eyes: negative for blurry vision or double vision  Ears, nose, mouth, throat, and face: negative for sore throat, negative for lip swelling  Respiratory: negative for SOB and cough  Cardiovascular: negative for CP, negative for palpitations  Gastrointestinal: negative for nausea, vomiting, diarrhea, constipation, melena, hematochezia  Genitourinary: negative for dysuria  Integument/breast: negative for skin rash  Hematologic/lymphatic: negative for bruising  Musculoskeletal: negative for muscle aches  Neurological: no dizziness or h/a    Objective:        Patient Vitals for the past 8 hrs:   BP Temp Pulse Resp SpO2 Height Weight   22 0434 111/61 -- -- -- 94 % -- --   22 0404 (!) 112/54 -- -- -- 93 % -- --   22 0356 -- -- -- -- 93 % -- --   22 0349 114/60 99.5 °F (37.5 °C) (!) 102 -- 93 % -- --   22 0309 128/61 (!) 100.7 °F (38.2 °C) 99 -- 100 % -- --   22 0219 (!) 147/66 -- 91 16 91 % -- --   22 0043 (!) 180/71 98.8 °F (37.1 °C) 72 16 96 % -- --   22 0042 -- -- -- -- -- 5' 5\" (1.651 m) 69 kg (152 lb 1.9 oz)       Temp (24hrs), Av.7 °F (37.6 °C), Min:98.8 °F (37.1 °C), Max:100.7 °F (38.2 °C)      Physical Exam:  General:  Alert, cooperative, no distress, appears stated age. Eyes:  Conjunctivae/corneas clear. Nose: Nares normal. Septum midline   Mouth/Throat: Lips, mucosa, and tongue normal.    Neck: Supple, symmetrical, trachea midline   Lungs:   Clear to auscultation bilaterally. Heart:  Regular rate and rhythm   Abdomen:   Soft, diffusely TTP mainly in LLQ and suprapubic, no peritoneal signs, distended   Extremities: Extremities normal, atraumatic, no cyanosis or edema. Skin: Skin color, texture, turgor normal. No rashes or lesions   Neuro: Alert, oriented, speech clear     Labs:   Recent Labs     22  0128   WBC 12.6*   HGB 12.2   HCT 38.1        Recent Labs     22  0128      K 4.0      CO2 29   *   BUN 21*   CREA 0.95   CA 9.0   ALB 2.7*   TBILI 0.7   ALT 20     No results for input(s): INR, INREXT in the last 72 hours. Assessment and Plan:   81 y/o male with suspected perforated diverticulitis in sigmoid with free air and intraabdominal collections. I explained to patient procedure of exploratory laparotomy, washout, possible sigmoidectomy, possible colostomy.   I explained risks of surgery which include but are not limited to infection, bleeding, injury to anything near the area we are working in, possible anastomotic leak, ileus, abscess.   All questions answered        Signed By: Reuben Bills MD     March 30, 2022

## 2022-03-30 NOTE — PROGRESS NOTES
Spiritual Care Assessment/Progress Note  [address]      NAME: Roderick Tsai      MRN: [id number]  AGE: 80 y.o. SEX: male  Anglican Affiliation: Moravian   Language: English     3/30/2022     Total Time (in minutes): 7     Spiritual Assessment begun in [facility] INTENSIVE CARE through conversation with:         []Patient        [] Family    [] Friend(s)        Reason for Consult: Initial/Spiritual assessment, critical care     Spiritual beliefs: (Please include comment if needed)     [] Identifies with a viridiana tradition:         [] Supported by a viridiana community:            [] Claims no spiritual orientation:           [] Seeking spiritual identity:                [] Adheres to an individual form of spirituality:           [x] Not able to assess:                           Identified resources for coping:      [] Prayer                               [] Music                  [] Guided Imagery     [] Family/friends                 [] Pet visits     [] Devotional reading                         [x] Unknown     [] Other:                                          Interventions offered during this visit: (See comments for more details)    Patient Interventions: Initial visit (Attempted)           Plan of Care:     [] Support spiritual and/or cultural needs    [] Support AMD and/or advance care planning process      [] Support grieving process   [] Coordinate Rites and/or Rituals    [] Coordination with community clergy   [] No spiritual needs identified at this time   [] Detailed Plan of Care below (See Comments)  [] Make referral to Music Therapy  [] Make referral to Pet Therapy     [] Make referral to Addiction services  [] Make referral to [facility]  [] Make referral to Spiritual Care Partner  [] No future visits requested        [x] Contact Spiritual Care for further referrals     Comments: Attempted initial spiritual assessment in ICU. Reviewed chart prior to visit.  Mr. De Leon Bowels is under contact restrictions, consulted with nurse who indicated he is resting comfortably right now. Unable to assess at this time. Contact Spiritual Care for any further referrals.   Smith George., MS., [address] [place] ([id number])

## 2022-03-30 NOTE — PROGRESS NOTES
Neuro: A&Ox4, follows command, moves all extremities  Cardiac: NSR on monitor, normotensive  Respiratory: diminished lung sounds, on 2L NC   GI: midline abd incision with staples intact. Left CINDY drain intact with moderate serosanguineous drainage. ON clear liquid diet, tolerating diet. No gas or bm  : winter intact with adequate nacho output  Progress Notes:  Afebrile  Will continue to monitor  Problem: Risk for Spread of Infection  Goal: Prevent transmission of infectious organism to others  Description: Prevent the transmission of infectious organisms to other patients, staff members, and visitors.   Outcome: Progressing Towards Goal     Problem: Patient Education:  Go to Education Activity  Goal: Patient/Family Education  Outcome: Progressing Towards Goal     Problem: Airway Clearance - Ineffective  Goal: Achieve or maintain patent airway  Outcome: Progressing Towards Goal     Problem: Gas Exchange - Impaired  Goal: Promote optimal lung function  Outcome: Progressing Towards Goal     Problem: Breathing Pattern - Ineffective  Goal: Ability to achieve and maintain a regular respiratory rate  Outcome: Progressing Towards Goal     Problem: Isolation Precautions - Risk of Spread of Infection  Goal: Prevent transmission of infectious organism to others  Outcome: Progressing Towards Goal

## 2022-03-30 NOTE — PROGRESS NOTES
Page sent to Dr Efrain Calles. Return call from Dr Scotty Puentes stating no intensivist consult needed at this time.

## 2022-03-30 NOTE — ANESTHESIA PREPROCEDURE EVALUATION
Relevant Problems   CARDIOVASCULAR   (+) Essential hypertension, benign      ENDOCRINE   (+) Type II or unspecified type diabetes mellitus without mention of complication, not stated as uncontrolled       Anesthetic History   No history of anesthetic complications            Review of Systems / Medical History  Patient summary reviewed and pertinent labs reviewed    Pulmonary            Asthma   Pertinent negatives: No smoker     Neuro/Psych           Pertinent negatives: No seizures, TIA and CVA   Cardiovascular    Hypertension          Hyperlipidemia  Pertinent negatives: No past MI, angina and CHF  Exercise tolerance: >4 METS     GI/Hepatic/Renal                Endo/Other    Diabetes: well controlled, type 2    Arthritis     Other Findings   Comments: COVID + on POCT today           Physical Exam    Airway  Mallampati: III  TM Distance: 4 - 6 cm  Neck ROM: normal range of motion   Mouth opening: Normal     Cardiovascular      Rate: normal         Dental    Dentition: Poor dentition  Comments: Prominent upper central incisors. Poor dentition throughout. Denies loose teeth.     Pulmonary  Breath sounds clear to auscultation               Abdominal  GI exam deferred       Other Findings            Anesthetic Plan    ASA: 3, emergent  Anesthesia type: general          Induction: Intravenous  Anesthetic plan and risks discussed with: Patient

## 2022-03-30 NOTE — PROGRESS NOTES
Reason for Admission:  Perforated sigmoid likely from diverticulitis with free air                      RUR Score:          14%(small            Plan for utilizing home health: To be dtermined      PCP: First and Last name:  Haley Nur MD     Name of Practice:    Are you a current patient: Yes/No: yes   Approximate date of last visit:    Can you participate in a virtual visit with your PCP:                     Current Advanced Directive/Advance Care Plan:   Full code    Healthcare Decision Maker:     Wife-Dipika [phone]                             Transition of Care Plan:                    Pt is Covid  +  Full assessment tomorrow with wife/family.     Durga Aceves

## 2022-03-30 NOTE — OP NOTES
Jarett Quintero [facility] 79  OPERATIVE REPORT    Name:  Adán Plasencia  MR#:  [id number]  :  1933  ACCOUNT #:  [de-identified]  DATE OF SERVICE:  2022    PREOPERATIVE DIAGNOSIS:  Perforated diverticulitis. POSTOPERATIVE DIAGNOSIS:  Perforated diverticulitis. PROCEDURES PERFORMED:  Exploratory laparotomy, sigmoidectomy, abdominal washout. SURGEONS:  Aziza Lipscomb MD and Brandon Montez MD    ASSISTANT:  None    ANESTHESIA:  General endotracheal anesthesia. COMPLICATIONS:  none    SPECIMENS REMOVED:  Sigmoid colon. IMPLANTS:  None. ESTIMATED BLOOD LOSS:  30 mL. DRAINS:  15-[place] Froylan drain, left upper pelvis. DISPOSITION:  The patient was taken to the ICU in stable condition. PROCEDURE:  The patient was taken to the operating room and placed on the operating room table. He had a Roth placed and was intubated and sedated. Ultimately, he was placed in the lithotomy position. Abdomen was prepped and draped in a standard surgical fashion and ultimately, perineal area was also prepped and draped. A midline incision was performed after a time-out was performed and carried down the subcutaneous tissues into the abdominal cavity with the cautery after making a midline with a knife. Immediately, there was a small amount of purulent material, a small amount that was suctioned out. I placed an Connie Moons. I identified the left sigmoid colon. There was a lot of hardened diverticula throughout his entire left colon. Diverticula was not inflamed in the left colon but with stool inside it. Now, further down towards the sigmoid, there was definite some purulent material from the perforation. We started mobilizing the left colon along the white line of Toldt up to the splenic flexure in order to have enough length and continued my way down.   Once this was done in the sigmoid colon, there was a section of sigmoid colon initially thought was a contained abscess but it actually was a very large diverticulum that it appeared to have enlarged itself over time as the tissue was extremely thickened in this area until, finally, it seems like it perforated and these were the so-called rim-enhancing collections that were seen on the CAT scan but they were actually just enlarged diverticula with stool was inside it and these were dissected from the anterior peritoneal wall and also from the side. Once we were able to sort of take down this adhesed area to further uncoil and see the direction of the sigmoid colon, we then identified an area proximal to this where we made a window in the mesentery and came across the [place] stapler blue loads. We then used the Enseal device to come across the mesentery and towards the specimen area and then identified a window in the sigmoid-rectal junction and came across again with the [place] blue loads. The specimen was passed on. We proceeded to wash out the abdomen with some saline and the upper quadrants as well. I inspected the liver. All that was normal.  I inspected the right colon. It appeared normal.  The appendix appeared normal as well. We inspected the small bowel and also saw that there was a lot of diverticula within the small bowel as well. However, none of it appeared to be actively inflamed. We tried coming from below the stapler, had to disimpact the patient several times and upon coming below, we decided that there was an area that was a little bit thin and the stump was slightly too long so, this time, we again took some of the mesentery beneath the stump and some of the peritoneal reflection on both sides of the sigmoid-rectal stump with a contour stapler, came around again and sent that piece off for specimen. Once this was done,we then proceeded to come again down with an EEA stapler.   By this point, we had opened up the proximal end of the colon and placed the anvil of the 29 EEA stapler and, with a pursestring suture, sewed it in place. Once we were able to see that the EEA stapler was in appropriate position in the rectal area, we proceeded to connect both ends and fired the stapler according to  instructions. There were two intact rings that came off the stapler and a saline bubble test was performed several times and no bubbles were seen. Then, the area was again irrigated. I proceeded to create a small tongue of omentum and place it all the way down in the pelvis around the anastomotic area as well. A 15-Cymro Froylan drain was then placed around the pelvic area as well and we proceeded to close the abdomen with PDS and approximate the skin with staples and a drain stitch was placed. The wounds were dressed. The patient tolerated the procedure.       MD ASHU [id number]  D:  03/30/2022 14:21  T:  03/30/2022 16:27  JOB #:  [id number]

## 2022-03-30 NOTE — ROUTINE PROCESS
TRANSFER - OUT REPORT:    Verbal report given to [facility], RN(name) on Lela Thomson  being transferred to [facility](unit) for routine post - op       Report consisted of patients Situation, Background, Assessment and   Recommendations(SBAR). Information from the following report(s) SBAR and MAR was reviewed with the receiving nurse. Opportunity for questions and clarification was provided.       Patient transported with:   O2 @ 2 liters  Registered Nurse

## 2022-03-30 NOTE — ANESTHESIA POSTPROCEDURE EVALUATION
Procedure(s):  LAPAROTOMY EXPLORATORY; SIGMOID COLECTOMY. general    Anesthesia Post Evaluation      Multimodal analgesia: multimodal analgesia not used between 6 hours prior to anesthesia start to PACU discharge  Patient location during evaluation: PACU  Patient participation: complete - patient participated  Level of consciousness: awake  Pain management: adequate  Airway patency: patent  Anesthetic complications: no  Cardiovascular status: acceptable, blood pressure returned to baseline and hemodynamically stable  Respiratory status: acceptable  Hydration status: acceptable  Post anesthesia nausea and vomiting:  controlled      INITIAL Post-op Vital signs:   Vitals Value Taken Time   /51 03/30/22 1025   Temp 36.7 °C (98 °F) 03/30/22 0952   Pulse 77 03/30/22 1025   Resp 14 03/30/22 1025   SpO2 95 % 03/30/22 1025   Vitals shown include unvalidated device data.

## 2022-03-30 NOTE — BRIEF OP NOTE
Brief Postoperative Note    Patient: Chano Wagoner  YOB: 1933  MRN: [id number]    Date of Procedure: 3/30/2022     Pre-Op Diagnosis: perforated bowel    Post-Op Diagnosis: Same as preoperative diagnosis.       Procedure(s):  LAPAROTOMY EXPLORATORY; SIGMOID COLECTOMY    Surgeon(s):  MD Annemarie Diallo MD    Surgical Assistant: Surg Asst-1: Rob Chris    Anesthesia: General     Estimated Blood Loss (mL): less than 50     Complications: None    Specimens:   ID Type Source Tests Collected by Time Destination   1 : SIGMOID AND ANASTOMOTIC DONUTS Preservative Intestine  Annemarie Michelle MD 3/30/2022 0745 Pathology        Implants: none  Drains: belinda drain in pelvis    Findings: perforated sigmoid colon with diverticula throughout, large thick walled abscess cavity/diverticula adherent to left alteral side wall, no feculent soilage  Diverticula also seen through large segments of small bowel    Electronically Signed by Taylor Gonzalez MD on 3/30/2022 at 9:15 AM

## 2022-03-30 NOTE — CONSULTS
[facility]  Hospitalist Group                                                                                          Hospitalist Progress Note  Pedro Beach MD  Answering service: [phone] from in house phone        Date of Service:  3/30/2022  NAME:  Madisyn Monte  :  3/26/1933  MRN:  [id number]      Admission Summary: \"Leon Ayala is a 80 y.o. male with past medical history notable for bronchiectasis, hyperlipidemia, asthma, history of empyema, hypertension, hypercholesterolemia, lower urinary tract symptoms, BPH (on flomax), impaired fasting glucose, recently diagnosed with COVID-19 3/22/2022 presenting with abdominal pain over the past evening.  He states that he was diagnosed with Covid after feeling generally unwell for several days.  This was over 9 days ago. Rejikeiry Anderson reports he started having abdominal pain yesterday which was not improving, but actually getting worse. Denies any nausea or vomiting, denies any history of constipation. Reports his last colonoscopy was when he was around 84y/o. CT done is consistent with perforated sigmoid, likely from diverticulitis with free air and 2 collections adjacent to sigmoid. His WBC is 12. COVID test is positive\"    Interval history / Subjective:   3/30/2022. Saw patient this morning, awake alert and oriented, appears weak, stating that he is trying to get better. Does not appear to be in any apparent distress. Assessment & Plan:     Perforated sigmoid colon  Status post exploratory laparoscopy  Will defer management to surgical team.    COPD  Does not appear to be acutely exacerbated. Continue LAMA, LABA. As needed DuoNebs, supplemental oxygen, flutter valve and pulmonary toileting. Dyslipidemia   Continue statins. BPH   Asymptomatic, continue finasteride. Monitor for urinary retention. Hypertension   Euvolemic, SBP on the soft side. Continue losartan, hold if MAP less than 65.     Code status: Full code  DVT prophylaxis: SCD    Care Plan discussed with: Patient/Family  Anticipated Disposition: Home w/Family  Anticipated Discharge: Less than 24 hours     Hospital Problems  Date Reviewed: 9/25/2019          Codes Class Noted POA    S/P exploratory laparotomy ICD-10-CM: Z98.890  ICD-9-CM: V45.89  3/30/2022 Unknown                Review of Systems:   A comprehensive review of systems was negative except for that written in the HPI. Vital Signs:    Last 24hrs VS reviewed since prior progress note. Most recent are:  Visit Vitals  BP (!) 113/53   Pulse 71   Temp 97.6 °F (36.4 °C)   Resp 13   Ht 5' 5\" (1.651 m)   Wt 69 kg (152 lb 1.9 oz)   SpO2 98%   BMI 25.31 kg/m²         Intake/Output Summary (Last 24 hours) at 3/30/2022 1535  Last data filed at 3/30/2022 1457  Gross per 24 hour   Intake 4671.67 ml   Output 685 ml   Net 3986.67 ml        Physical Examination:     I had a face to face encounter with this patient and independently examined them on 3/30/2022 as outlined below:          Constitutional:  No acute distress, cooperative, pleasant    ENT:  Oral mucosa moist, oropharynx benign. Resp:  CTA bilaterally. No wheezing/rhonchi/rales. No accessory muscle use   CV:  Regular rhythm, normal rate, no murmurs, gallops, rubs    GI:  Soft, non distended, non tender. normoactive bowel sounds, no hepatosplenomegaly     Musculoskeletal:  No edema, warm, 2+ pulses throughout    Neurologic:  Moves all extremities. AAOx3, CN II-XII reviewed            Data Review:    Review and/or order of clinical lab test      Labs:     Recent Labs     03/30/22  0128   WBC 12.6*   HGB 12.2   HCT 38.1        Recent Labs     03/30/22  0128      K 4.0      CO2 29   BUN 21*   CREA 0.95   *   CA 9.0     Recent Labs     03/30/22  0128   ALT 20   AP 57   TBILI 0.7   TP 7.2   ALB 2.7*   GLOB 4.5*   LPSE 71*     No results for input(s): INR, PTP, APTT, INREXT in the last 72 hours.    No results for input(s): FE, TIBC, PSAT, FERR in the last 72 hours. No results found for: FOL, RBCF   No results for input(s): PH, PCO2, PO2 in the last 72 hours. No results for input(s): CPK, CKNDX, TROIQ in the last 72 hours.     No lab exists for component: CPKMB  Lab Results   Component Value Date/Time    Cholesterol, total 108 05/31/2013 10:42 AM    HDL Cholesterol 57 05/31/2013 10:42 AM    LDL, calculated 41 05/31/2013 10:42 AM    Triglyceride 48 05/31/2013 10:42 AM    CHOL/HDL Ratio 2.4 02/02/2010 09:20 AM     Lab Results   Component Value Date/Time    Glucose (POC) 179 (H) 03/30/2022 10:14 AM    Glucose (POC) 117 (H) 01/27/2014 10:12 AM    Glucose (POC) 154 (H) 01/27/2014 06:47 AM    Glucose (POC) 177 (H) 04/03/2013 04:25 PM    Glucose (POC) 154 (H) 02/14/2013 11:42 AM     Lab Results   Component Value Date/Time    Color YELLOW/STRAW 03/30/2022 01:28 AM    Appearance CLEAR 03/30/2022 01:28 AM    Specific gravity 1.025 03/30/2022 01:28 AM    Specific gravity 1.010 09/07/2017 09:46 AM    pH (UA) 6.0 03/30/2022 01:28 AM    Protein 30 (A) 03/30/2022 01:28 AM    Glucose Negative 03/30/2022 01:28 AM    Ketone Negative 03/30/2022 01:28 AM    Bilirubin Negative 03/30/2022 01:28 AM    Urobilinogen 0.2 03/30/2022 01:28 AM    Nitrites Negative 03/30/2022 01:28 AM    Leukocyte Esterase Negative 03/30/2022 01:28 AM    Epithelial cells FEW 03/30/2022 01:28 AM    Bacteria 2+ (A) 03/30/2022 01:28 AM    WBC 0-4 03/30/2022 01:28 AM    RBC 20-50 03/30/2022 01:28 AM         Medications Reviewed:     Current Facility-Administered Medications   Medication Dose Route Frequency    levoFLOXacin (LEVAQUIN) 750 mg in D5W IVPB  750 mg IntraVENous Q24H    metroNIDAZOLE (FLAGYL) IVPB premix 500 mg  500 mg IntraVENous Q12H    lactated Ringers infusion  100 mL/hr IntraVENous CONTINUOUS    sodium chloride (NS) flush 5-40 mL  5-40 mL IntraVENous Q8H    oxyCODONE IR (ROXICODONE) tablet 5 mg  5 mg Oral Q4H PRN    acetaminophen (TYLENOL) tablet 650 mg  650 mg Oral Q6H    finasteride (PROSCAR) tablet 5 mg  5 mg Oral DAILY    losartan (COZAAR) tablet 100 mg  100 mg Oral DAILY    tamsulosin (FLOMAX) capsule 0.4 mg  0.4 mg Oral DAILY    budesonide-formoterol (SYMBICORT) 80-4.5 mcg inhaler  2 Puff Inhalation BID RT    albuterol (PROVENTIL HFA, VENTOLIN HFA, PROAIR HFA) inhaler 2 Puff  2 Puff Inhalation Q4H PRN    morphine injection 2 mg  2 mg IntraVENous Q4H PRN     ______________________________________________________________________  EXPECTED LENGTH OF STAY: - - -  ACTUAL LENGTH OF STAY:          0                 Delfino Herrera MD

## 2022-03-30 NOTE — ED NOTES
Per Dr Steph Valero, ED to ED transfer  [facility] ETA 3400    Notified nursing supervisor Phil tellez at [facility] of pt transfer

## 2022-03-31 LAB
ALBUMIN SERPL-MCNC: 1.6 G/DL (ref 3.5–5)
ALBUMIN/GLOB SERPL: 0.5 {RATIO} (ref 1.1–2.2)
ALP SERPL-CCNC: 39 U/L (ref 45–117)
ALT SERPL-CCNC: 10 U/L (ref 12–78)
ANION GAP SERPL CALC-SCNC: 4 MMOL/L (ref 5–15)
AST SERPL-CCNC: 8 U/L (ref 15–37)
ATRIAL RATE: 98 BPM
BILIRUB SERPL-MCNC: 0.8 MG/DL (ref 0.2–1)
BUN SERPL-MCNC: 18 MG/DL (ref 6–20)
BUN/CREAT SERPL: 21 (ref 12–20)
CALCIUM SERPL-MCNC: 7.8 MG/DL (ref 8.5–10.1)
CALCULATED P AXIS, ECG09: 38 DEGREES
CALCULATED R AXIS, ECG10: 23 DEGREES
CALCULATED T AXIS, ECG11: 56 DEGREES
CHLORIDE SERPL-SCNC: 105 MMOL/L (ref 97–108)
CO2 SERPL-SCNC: 28 MMOL/L (ref 21–32)
CREAT SERPL-MCNC: 0.86 MG/DL (ref 0.7–1.3)
DIAGNOSIS, 93000: NORMAL
ERYTHROCYTE [DISTWIDTH] IN BLOOD BY AUTOMATED COUNT: 12.5 % (ref 11.5–14.5)
GLOBULIN SER CALC-MCNC: 3.5 G/DL (ref 2–4)
GLUCOSE SERPL-MCNC: 139 MG/DL (ref 65–100)
HCT VFR BLD AUTO: 32.6 % (ref 36.6–50.3)
HGB BLD-MCNC: 10.6 G/DL (ref 12.1–17)
MCH RBC QN AUTO: 29 PG (ref 26–34)
MCHC RBC AUTO-ENTMCNC: 32.5 G/DL (ref 30–36.5)
MCV RBC AUTO: 89.1 FL (ref 80–99)
NRBC # BLD: 0 K/UL (ref 0–0.01)
NRBC BLD-RTO: 0 PER 100 WBC
P-R INTERVAL, ECG05: 170 MS
PLATELET # BLD AUTO: 232 K/UL (ref 150–400)
PMV BLD AUTO: 9.5 FL (ref 8.9–12.9)
POTASSIUM SERPL-SCNC: 4.3 MMOL/L (ref 3.5–5.1)
PROT SERPL-MCNC: 5.1 G/DL (ref 6.4–8.2)
Q-T INTERVAL, ECG07: 332 MS
QRS DURATION, ECG06: 76 MS
QTC CALCULATION (BEZET), ECG08: 423 MS
RBC # BLD AUTO: 3.66 M/UL (ref 4.1–5.7)
SODIUM SERPL-SCNC: 137 MMOL/L (ref 136–145)
VENTRICULAR RATE, ECG03: 98 BPM
WBC # BLD AUTO: 13.2 K/UL (ref 4.1–11.1)

## 2022-03-31 PROCEDURE — 77010033678 HC OXYGEN DAILY

## 2022-03-31 PROCEDURE — 94762 N-INVAS EAR/PLS OXIMTRY CONT: CPT

## 2022-03-31 PROCEDURE — 36415 COLL VENOUS BLD VENIPUNCTURE: CPT

## 2022-03-31 PROCEDURE — 74011250637 HC RX REV CODE- 250/637: Performed by: SURGERY

## 2022-03-31 PROCEDURE — 94761 N-INVAS EAR/PLS OXIMETRY MLT: CPT

## 2022-03-31 PROCEDURE — 85027 COMPLETE CBC AUTOMATED: CPT

## 2022-03-31 PROCEDURE — 74011250636 HC RX REV CODE- 250/636: Performed by: SURGERY

## 2022-03-31 PROCEDURE — 65270000029 HC RM PRIVATE

## 2022-03-31 PROCEDURE — 80053 COMPREHEN METABOLIC PANEL: CPT

## 2022-03-31 PROCEDURE — 74011250636 HC RX REV CODE- 250/636: Performed by: NURSE PRACTITIONER

## 2022-03-31 PROCEDURE — 74011250636 HC RX REV CODE- 250/636: Performed by: STUDENT IN AN ORGANIZED HEALTH CARE EDUCATION/TRAINING PROGRAM

## 2022-03-31 PROCEDURE — 74011000250 HC RX REV CODE- 250: Performed by: ANESTHESIOLOGY

## 2022-03-31 PROCEDURE — 94640 AIRWAY INHALATION TREATMENT: CPT

## 2022-03-31 PROCEDURE — 74011250636 HC RX REV CODE- 250/636: Performed by: INTERNAL MEDICINE

## 2022-03-31 RX ORDER — ENOXAPARIN SODIUM 100 MG/ML
40 INJECTION SUBCUTANEOUS EVERY 24 HOURS
Status: DISCONTINUED | OUTPATIENT
Start: 2022-03-31 | End: 2022-04-06 | Stop reason: HOSPADM

## 2022-03-31 RX ADMIN — Medication 10 ML: at 05:00

## 2022-03-31 RX ADMIN — METRONIDAZOLE 500 MG: 500 INJECTION, SOLUTION INTRAVENOUS at 13:47

## 2022-03-31 RX ADMIN — OXYCODONE 5 MG: 5 TABLET ORAL at 20:13

## 2022-03-31 RX ADMIN — FINASTERIDE 5 MG: 5 TABLET, FILM COATED ORAL at 08:41

## 2022-03-31 RX ADMIN — ACETAMINOPHEN 650 MG: 325 TABLET ORAL at 11:45

## 2022-03-31 RX ADMIN — Medication 10 ML: at 13:47

## 2022-03-31 RX ADMIN — Medication 10 ML: at 22:35

## 2022-03-31 RX ADMIN — SODIUM CHLORIDE, POTASSIUM CHLORIDE, SODIUM LACTATE AND CALCIUM CHLORIDE 75 ML/HR: 600; 310; 30; 20 INJECTION, SOLUTION INTRAVENOUS at 18:23

## 2022-03-31 RX ADMIN — LEVOFLOXACIN 750 MG: 5 INJECTION, SOLUTION INTRAVENOUS at 08:42

## 2022-03-31 RX ADMIN — BUDESONIDE AND FORMOTEROL FUMARATE DIHYDRATE 2 PUFF: 80; 4.5 AEROSOL RESPIRATORY (INHALATION) at 22:35

## 2022-03-31 RX ADMIN — ENOXAPARIN SODIUM 40 MG: 40 INJECTION SUBCUTANEOUS at 13:47

## 2022-03-31 RX ADMIN — METRONIDAZOLE 500 MG: 500 INJECTION, SOLUTION INTRAVENOUS at 02:10

## 2022-03-31 RX ADMIN — SODIUM CHLORIDE, POTASSIUM CHLORIDE, SODIUM LACTATE AND CALCIUM CHLORIDE 125 ML/HR: 600; 310; 30; 20 INJECTION, SOLUTION INTRAVENOUS at 05:00

## 2022-03-31 RX ADMIN — BUDESONIDE AND FORMOTEROL FUMARATE DIHYDRATE 2 PUFF: 80; 4.5 AEROSOL RESPIRATORY (INHALATION) at 09:56

## 2022-03-31 RX ADMIN — TAMSULOSIN HYDROCHLORIDE 0.4 MG: 0.4 CAPSULE ORAL at 08:41

## 2022-03-31 RX ADMIN — ACETAMINOPHEN 650 MG: 325 TABLET ORAL at 05:00

## 2022-03-31 RX ADMIN — ACETAMINOPHEN 650 MG: 325 TABLET ORAL at 18:23

## 2022-03-31 NOTE — PROGRESS NOTES
Transition of care note:    RUR 13%    Problems:  Perforated diverticulitis  Free intraperitoneal air  POD #1 ex lap,sigmoidectomy,abdominal washout  Covid +(diagnosed 10 days ago)    I contacted pt.'s Jeff Krishnan @ [phone]. Pt is very active fishing and cutting grass. Pt played tennis for 20 years. Wife describes her  as independent and more active than most individuals a lot younger than him. They live in a one story home with one entry step through the garage. Wife conformed PCP is Dr Anne Washington and that Dr Anne Washington treated both pt & his wife for \"Covid with medicine last week. \" but could not remember the name of the medication. In the past,pt had outpatient PT for his knee replacements but no home health or rehab previously. When stable,please order PT/OT   Pt has orders to transfer to surgical floor.     Penny Neves

## 2022-03-31 NOTE — CONSULTS
[facility] Adult  Hospitalist Group                                                                                          Hospitalist Progress Note  Jonh Ghosh MD  Answering service: [phone] from in house phone        Date of Service:  3/31/2022  NAME:  Yue Phillips  :  3/26/1933  MRN:  [id number]      Admission Summary: \"Leon Blackwood is a 80 y.o. male with past medical history notable for bronchiectasis, hyperlipidemia, asthma, history of empyema, hypertension, hypercholesterolemia, lower urinary tract symptoms, BPH (on flomax), impaired fasting glucose, recently diagnosed with COVID-19 3/22/2022 presenting with abdominal pain over the past evening.  He states that he was diagnosed with Covid after feeling generally unwell for several days.  This was over 9 days ago. [facility] reports he started having abdominal pain yesterday which was not improving, but actually getting worse. Denies any nausea or vomiting, denies any history of constipation. Reports his last colonoscopy was when he was around 86y/o. CT done is consistent with perforated sigmoid, likely from diverticulitis with free air and 2 collections adjacent to sigmoid. His WBC is 12. COVID test is positive\"    Interval history / Subjective:   3/30/2022. Saw patient this morning, awake alert and oriented, appears weak, stating that he is trying to get better. Does not appear to be in any apparent distress. 3/21/2022. No acute events overnight. Saw patient this morning, awake alert oriented in no apparent distress, has not had a bowel movement, passing flatus, denies any fever, chills, nausea, vomiting. Abdominal pain is tolerable. Assessment & Plan:     Perforated sigmoid colon  Status post exploratory laparoscopy  Will defer management to surgical team.    COPD  Does not appear to be acutely exacerbated. Continue LAMA, LABA.   As needed DuoNebs, supplemental oxygen, flutter valve and pulmonary toileting. Dyslipidemia   Continue statins. BPH   Asymptomatic, continue finasteride. Monitor for urinary retention. Hypertension   Euvolemic, SBP on the soft side. Continue losartan, hold if MAP less than 65. Code status: Full code  DVT prophylaxis: SCD    Care Plan discussed with: Patient/Family  Anticipated Disposition: Home w/Family  Anticipated Discharge: Less than 24 hours     Hospital Problems  Date Reviewed: 9/25/2019          Codes Class Noted POA    S/P exploratory laparotomy ICD-10-CM: Z98.890  ICD-9-CM: V45.89  3/30/2022 Unknown                Review of Systems:   A comprehensive review of systems was negative except for that written in the HPI. Vital Signs:    Last 24hrs VS reviewed since prior progress note. Most recent are:  Visit Vitals  BP (!) 120/50 (BP 1 Location: Left upper arm, BP Patient Position: At rest)   Pulse 70   Temp 98.4 °F (36.9 °C)   Resp 21   Ht 5' 5\" (1.651 m)   Wt 69 kg (152 lb 1.9 oz)   SpO2 91%   BMI 25.31 kg/m²         Intake/Output Summary (Last 24 hours) at 3/31/2022 1220  Last data filed at 3/31/2022 1130  Gross per 24 hour   Intake 2687.5 ml   Output 1095 ml   Net 1592.5 ml        Physical Examination:     I had a face to face encounter with this patient and independently examined them on 3/31/2022 as outlined below:          Constitutional:  No acute distress, cooperative, pleasant    ENT:  Oral mucosa moist, oropharynx benign. Resp:  CTA bilaterally. No wheezing/rhonchi/rales. No accessory muscle use   CV:  Regular rhythm, normal rate, no murmurs, gallops, rubs    GI:  Soft, non distended, non tender. normoactive bowel sounds, no hepatosplenomegaly     Musculoskeletal:  No edema, warm, 2+ pulses throughout    Neurologic:  Moves all extremities.   AAOx3, CN II-XII reviewed            Data Review:    Review and/or order of clinical lab test      Labs:     Recent Labs     03/31/22  0213 03/30/22  0128   WBC 13.2* 12.6*   HGB 10.6* 12.2   HCT 32.6* 38.1  259     Recent Labs     03/31/22  0213 03/30/22  0128    137   K 4.3 4.0    101   CO2 28 29   BUN 18 21*   CREA 0.86 0.95   * 187*   CA 7.8* 9.0     Recent Labs     03/31/22  0213 03/30/22  0128   ALT 10* 20   AP 39* 57   TBILI 0.8 0.7   TP 5.1* 7.2   ALB 1.6* 2.7*   GLOB 3.5 4.5*   LPSE  --  71*     No results for input(s): INR, PTP, APTT, INREXT, INREXT in the last 72 hours. No results for input(s): FE, TIBC, PSAT, FERR in the last 72 hours. No results found for: FOL, RBCF   No results for input(s): PH, PCO2, PO2 in the last 72 hours. No results for input(s): CPK, CKNDX, TROIQ in the last 72 hours.     No lab exists for component: CPKMB  Lab Results   Component Value Date/Time    Cholesterol, total 108 05/31/2013 10:42 AM    HDL Cholesterol 57 05/31/2013 10:42 AM    LDL, calculated 41 05/31/2013 10:42 AM    Triglyceride 48 05/31/2013 10:42 AM    CHOL/HDL Ratio 2.4 02/02/2010 09:20 AM     Lab Results   Component Value Date/Time    Glucose (POC) 179 (H) 03/30/2022 10:14 AM    Glucose (POC) 117 (H) 01/27/2014 10:12 AM    Glucose (POC) 154 (H) 01/27/2014 06:47 AM    Glucose (POC) 177 (H) 04/03/2013 04:25 PM    Glucose (POC) 154 (H) 02/14/2013 11:42 AM     Lab Results   Component Value Date/Time    Color YELLOW/STRAW 03/30/2022 01:28 AM    Appearance CLEAR 03/30/2022 01:28 AM    Specific gravity 1.025 03/30/2022 01:28 AM    Specific gravity 1.010 09/07/2017 09:46 AM    pH (UA) 6.0 03/30/2022 01:28 AM    Protein 30 (A) 03/30/2022 01:28 AM    Glucose Negative 03/30/2022 01:28 AM    Ketone Negative 03/30/2022 01:28 AM    Bilirubin Negative 03/30/2022 01:28 AM    Urobilinogen 0.2 03/30/2022 01:28 AM    Nitrites Negative 03/30/2022 01:28 AM    Leukocyte Esterase Negative 03/30/2022 01:28 AM    Epithelial cells FEW 03/30/2022 01:28 AM    Bacteria 2+ (A) 03/30/2022 01:28 AM    WBC 0-4 03/30/2022 01:28 AM    RBC 20-50 03/30/2022 01:28 AM         Medications Reviewed:     Current Facility-Administered Medications   Medication Dose Route Frequency    levoFLOXacin (LEVAQUIN) 750 mg in D5W IVPB  750 mg IntraVENous Q24H    metroNIDAZOLE (FLAGYL) IVPB premix 500 mg  500 mg IntraVENous Q12H    lactated Ringers infusion  125 mL/hr IntraVENous CONTINUOUS    sodium chloride (NS) flush 5-40 mL  5-40 mL IntraVENous Q8H    oxyCODONE IR (ROXICODONE) tablet 5 mg  5 mg Oral Q4H PRN    acetaminophen (TYLENOL) tablet 650 mg  650 mg Oral Q6H    finasteride (PROSCAR) tablet 5 mg  5 mg Oral DAILY    losartan (COZAAR) tablet 100 mg  100 mg Oral DAILY    tamsulosin (FLOMAX) capsule 0.4 mg  0.4 mg Oral DAILY    budesonide-formoterol (SYMBICORT) 80-4.5 mcg inhaler  2 Puff Inhalation BID RT    albuterol (PROVENTIL HFA, VENTOLIN HFA, PROAIR HFA) inhaler 2 Puff  2 Puff Inhalation Q4H PRN    morphine injection 2 mg  2 mg IntraVENous Q4H PRN     ______________________________________________________________________  EXPECTED LENGTH OF STAY: - - -  ACTUAL LENGTH OF STAY:          1                 Romy Do MD

## 2022-03-31 NOTE — PROGRESS NOTES
0700 - Bedside shift change report given to Lionel (oncoming nurse) by Nelson Peters (offgoing nurse). Report included the following information SBAR, Kardex, ED Summary, OR Summary, Procedure Summary, Intake/Output, MAR, Recent Results, Med Rec Status, Cardiac Rhythm NSR and Alarm Parameters . Primary Nurse Adela Cervantes RN and Adrian Izquierdo RN performed a dual skin assessment on this patient No impairment noted  Brendan score is 17    0800 - Assessment completed. No complaints of pain at this time. Patient states he does not wear O2 at home, patient only on 2 L/min, took off O2, now on room air. 1000 - Patient tolerating room air, O2 sats at 90-92%, no complaints of shortness of breath at this time. 1200 - Reassessment completed. Patient still tolerating room air, no complaints of pain or shortness of breath at this time. 1351 - Removed winter. 1408 - Received orders for patient to transfer to surgical floor. 1420 - Patient's O2 86-87%, encouraged patient to take deep breaths, O2 sats still not increasing. Placed patient back on 2 L/min via nasal cannula, repositioned patient higher in bed, O2 sats now at 94%. Provided patient incentive spirometer and educated patient on how to use, patient demonstrated and verbalized understanding. 1600 - Reassessment completed. 1900 - Bedside shift change report given to Nelson Peters (oncoming nurse) by Fabián Madrigal (offgoing nurse). Report included the following information SBAR, Kardex, ED Summary, OR Summary, Procedure Summary, Intake/Output, MAR, Recent Results, Med Rec Status, Cardiac Rhythm NSR with 1st degree AV block and Alarm Parameters .

## 2022-03-31 NOTE — PROGRESS NOTES
General Surgery Daily Progress Note    Patient: Madisyn Monte MRN: [id number]  SSN: [id number]    YOB: 1933  Age: 80 y.o. Sex: male      Admit Date: 3/30/2022    POD 1 Day Post-Op    Procedure: Procedure(s):  LAPAROTOMY EXPLORATORY; SIGMOID COLECTOMY    Subjective:   Patient reports doing well  Nancy clears  Denies N/V  No BM  Reports pain well controlled    Current Facility-Administered Medications   Medication Dose Route Frequency    enoxaparin (LOVENOX) injection 40 mg  40 mg SubCUTAneous Q24H    levoFLOXacin (LEVAQUIN) 750 mg in D5W IVPB  750 mg IntraVENous Q24H    metroNIDAZOLE (FLAGYL) IVPB premix 500 mg  500 mg IntraVENous Q12H    lactated Ringers infusion  75 mL/hr IntraVENous CONTINUOUS    sodium chloride (NS) flush 5-40 mL  5-40 mL IntraVENous Q8H    oxyCODONE IR (ROXICODONE) tablet 5 mg  5 mg Oral Q4H PRN    acetaminophen (TYLENOL) tablet 650 mg  650 mg Oral Q6H    finasteride (PROSCAR) tablet 5 mg  5 mg Oral DAILY    losartan (COZAAR) tablet 100 mg  100 mg Oral DAILY    tamsulosin (FLOMAX) capsule 0.4 mg  0.4 mg Oral DAILY    budesonide-formoterol (SYMBICORT) 80-4.5 mcg inhaler  2 Puff Inhalation BID RT    albuterol (PROVENTIL HFA, VENTOLIN HFA, PROAIR HFA) inhaler 2 Puff  2 Puff Inhalation Q4H PRN    morphine injection 2 mg  2 mg IntraVENous Q4H PRN        Objective:   03/31 0701 - 03/31 1900  In: 1177.5 [P.O.:240;  I.V.:937.5]  Out: 440 [Urine:420; Drains:20]  03/29 1901 - 03/31 0700  In: 6769.2 [I.V.:6769.2]  Out: 2178 [Urine:1180; Drains:140]  Patient Vitals for the past 8 hrs:   BP Temp Pulse Resp SpO2   03/31/22 1200 (!) 128/58 -- 82 21 --   03/31/22 1130 (!) 120/50 98.4 °F (36.9 °C) 70 21 91 %   03/31/22 1009 -- -- 82 20 91 %   03/31/22 1008 -- -- 88 20 91 %   03/31/22 1007 -- -- 87 21 91 %   03/31/22 1000 119/62 -- 86 23 92 %   03/31/22 0958 -- -- 88 25 93 %   03/31/22 0956 -- -- -- -- 92 %   03/31/22 0935 -- -- 87 20 91 %   03/31/22 0934 -- -- 90 21 92 %   03/31/22 0933 -- -- 87 22 92 %   03/31/22 0932 -- -- 88 16 92 %   03/31/22 0931 -- -- 94 19 93 %   03/31/22 0914 -- -- 89 23 90 %   03/31/22 0900 (!) 123/50 -- 84 21 92 %   03/31/22 0845 -- -- 78 24 91 %   03/31/22 0830 -- -- 61 15 95 %   03/31/22 0815 -- -- 60 15 95 %   03/31/22 0800 (!) 105/52 98.4 °F (36.9 °C) 65 18 95 %   03/31/22 0745 -- -- 60 19 95 %   03/31/22 0730 -- -- 67 21 95 %   03/31/22 0715 -- -- 61 17 95 %   03/31/22 0700 (!) 114/51 -- 70 21 96 %       Physical Exam:  General: Alert, cooperative, NAD  Lungs: Unlabored  Abdomen: Soft, appropriately TTP, drain serosanguinous.  Dressing C/D/I  Extremities: Warm, moves all, no edema  Skin:  Warm and dry, no rash    Labs:   Recent Labs     03/31/22 0213   WBC 13.2*   HGB 10.6*   HCT 32.6*        Recent Labs     03/31/22 0213      K 4.3      CO2 28   *   BUN 18   CREA 0.86   CA 7.8*   ALB 1.6*   TBILI 0.8   ALT 10*       Assessment / Plan:     POD 1 Day Post-Op    Procedure: Procedure(s):  LAPAROTOMY EXPLORATORY; SIGMOID COLECTOMY    Ok to start fulls  D/C winter  May down grade to floor  Decrease IVF  Cristo Greer MD

## 2022-04-01 LAB
ALBUMIN SERPL-MCNC: 1.6 G/DL (ref 3.5–5)
ALBUMIN/GLOB SERPL: 0.4 {RATIO} (ref 1.1–2.2)
ALP SERPL-CCNC: 48 U/L (ref 45–117)
ALT SERPL-CCNC: 15 U/L (ref 12–78)
ANION GAP SERPL CALC-SCNC: 4 MMOL/L (ref 5–15)
AST SERPL-CCNC: 29 U/L (ref 15–37)
BILIRUB SERPL-MCNC: 0.6 MG/DL (ref 0.2–1)
BUN SERPL-MCNC: 14 MG/DL (ref 6–20)
BUN/CREAT SERPL: 22 (ref 12–20)
CALCIUM SERPL-MCNC: 8.6 MG/DL (ref 8.5–10.1)
CHLORIDE SERPL-SCNC: 102 MMOL/L (ref 97–108)
CO2 SERPL-SCNC: 27 MMOL/L (ref 21–32)
CREAT SERPL-MCNC: 0.65 MG/DL (ref 0.7–1.3)
ERYTHROCYTE [DISTWIDTH] IN BLOOD BY AUTOMATED COUNT: 12.5 % (ref 11.5–14.5)
GLOBULIN SER CALC-MCNC: 4 G/DL (ref 2–4)
GLUCOSE SERPL-MCNC: 83 MG/DL (ref 65–100)
HCT VFR BLD AUTO: 33.4 % (ref 36.6–50.3)
HGB BLD-MCNC: 11 G/DL (ref 12.1–17)
MCH RBC QN AUTO: 29.1 PG (ref 26–34)
MCHC RBC AUTO-ENTMCNC: 32.9 G/DL (ref 30–36.5)
MCV RBC AUTO: 88.4 FL (ref 80–99)
NRBC # BLD: 0 K/UL (ref 0–0.01)
NRBC BLD-RTO: 0 PER 100 WBC
PLATELET # BLD AUTO: 291 K/UL (ref 150–400)
PMV BLD AUTO: 9.1 FL (ref 8.9–12.9)
POTASSIUM SERPL-SCNC: 4 MMOL/L (ref 3.5–5.1)
PROT SERPL-MCNC: 5.6 G/DL (ref 6.4–8.2)
RBC # BLD AUTO: 3.78 M/UL (ref 4.1–5.7)
SODIUM SERPL-SCNC: 133 MMOL/L (ref 136–145)
WBC # BLD AUTO: 16.5 K/UL (ref 4.1–11.1)

## 2022-04-01 PROCEDURE — 74011250636 HC RX REV CODE- 250/636: Performed by: NURSE PRACTITIONER

## 2022-04-01 PROCEDURE — 94761 N-INVAS EAR/PLS OXIMETRY MLT: CPT

## 2022-04-01 PROCEDURE — 97161 PT EVAL LOW COMPLEX 20 MIN: CPT

## 2022-04-01 PROCEDURE — 74011250637 HC RX REV CODE- 250/637: Performed by: SURGERY

## 2022-04-01 PROCEDURE — 94640 AIRWAY INHALATION TREATMENT: CPT

## 2022-04-01 PROCEDURE — 2709999900 HC NON-CHARGEABLE SUPPLY

## 2022-04-01 PROCEDURE — 65270000029 HC RM PRIVATE

## 2022-04-01 PROCEDURE — 85027 COMPLETE CBC AUTOMATED: CPT

## 2022-04-01 PROCEDURE — 74011000250 HC RX REV CODE- 250: Performed by: ANESTHESIOLOGY

## 2022-04-01 PROCEDURE — 74011250636 HC RX REV CODE- 250/636: Performed by: STUDENT IN AN ORGANIZED HEALTH CARE EDUCATION/TRAINING PROGRAM

## 2022-04-01 PROCEDURE — 74011250637 HC RX REV CODE- 250/637: Performed by: NURSE PRACTITIONER

## 2022-04-01 PROCEDURE — 74011250636 HC RX REV CODE- 250/636: Performed by: SURGERY

## 2022-04-01 PROCEDURE — 36415 COLL VENOUS BLD VENIPUNCTURE: CPT

## 2022-04-01 PROCEDURE — 74011250637 HC RX REV CODE- 250/637: Performed by: INTERNAL MEDICINE

## 2022-04-01 PROCEDURE — 80053 COMPREHEN METABOLIC PANEL: CPT

## 2022-04-01 PROCEDURE — 97116 GAIT TRAINING THERAPY: CPT

## 2022-04-01 PROCEDURE — 77010033678 HC OXYGEN DAILY

## 2022-04-01 RX ORDER — DICYCLOMINE HYDROCHLORIDE 10 MG/1
10 CAPSULE ORAL 4 TIMES DAILY
Status: DISCONTINUED | OUTPATIENT
Start: 2022-04-01 | End: 2022-04-06 | Stop reason: HOSPADM

## 2022-04-01 RX ORDER — SIMETHICONE 80 MG
80 TABLET,CHEWABLE ORAL
Status: COMPLETED | OUTPATIENT
Start: 2022-04-01 | End: 2022-04-01

## 2022-04-01 RX ORDER — DOCUSATE SODIUM 100 MG/1
100 CAPSULE, LIQUID FILLED ORAL 2 TIMES DAILY
Status: DISCONTINUED | OUTPATIENT
Start: 2022-04-01 | End: 2022-04-06 | Stop reason: HOSPADM

## 2022-04-01 RX ADMIN — DOCUSATE SODIUM 100 MG: 100 CAPSULE ORAL at 12:09

## 2022-04-01 RX ADMIN — Medication 10 ML: at 05:39

## 2022-04-01 RX ADMIN — METRONIDAZOLE 500 MG: 500 INJECTION, SOLUTION INTRAVENOUS at 14:25

## 2022-04-01 RX ADMIN — OXYCODONE 5 MG: 5 TABLET ORAL at 01:21

## 2022-04-01 RX ADMIN — METRONIDAZOLE 500 MG: 500 INJECTION, SOLUTION INTRAVENOUS at 05:36

## 2022-04-01 RX ADMIN — LOSARTAN POTASSIUM 100 MG: 50 TABLET, FILM COATED ORAL at 09:56

## 2022-04-01 RX ADMIN — DICYCLOMINE HYDROCHLORIDE 10 MG: 10 CAPSULE ORAL at 20:29

## 2022-04-01 RX ADMIN — TAMSULOSIN HYDROCHLORIDE 0.4 MG: 0.4 CAPSULE ORAL at 09:56

## 2022-04-01 RX ADMIN — DICYCLOMINE HYDROCHLORIDE 10 MG: 10 CAPSULE ORAL at 23:15

## 2022-04-01 RX ADMIN — BUDESONIDE AND FORMOTEROL FUMARATE DIHYDRATE 2 PUFF: 80; 4.5 AEROSOL RESPIRATORY (INHALATION) at 08:10

## 2022-04-01 RX ADMIN — BUDESONIDE AND FORMOTEROL FUMARATE DIHYDRATE 2 PUFF: 80; 4.5 AEROSOL RESPIRATORY (INHALATION) at 22:22

## 2022-04-01 RX ADMIN — ACETAMINOPHEN 650 MG: 325 TABLET ORAL at 01:21

## 2022-04-01 RX ADMIN — SIMETHICONE 80 MG: 80 TABLET, CHEWABLE ORAL at 12:09

## 2022-04-01 RX ADMIN — SODIUM CHLORIDE, POTASSIUM CHLORIDE, SODIUM LACTATE AND CALCIUM CHLORIDE 75 ML/HR: 600; 310; 30; 20 INJECTION, SOLUTION INTRAVENOUS at 09:58

## 2022-04-01 RX ADMIN — FINASTERIDE 5 MG: 5 TABLET, FILM COATED ORAL at 09:56

## 2022-04-01 RX ADMIN — ACETAMINOPHEN 650 MG: 325 TABLET ORAL at 12:09

## 2022-04-01 RX ADMIN — ACETAMINOPHEN 650 MG: 325 TABLET ORAL at 05:42

## 2022-04-01 RX ADMIN — LEVOFLOXACIN 750 MG: 5 INJECTION, SOLUTION INTRAVENOUS at 09:56

## 2022-04-01 RX ADMIN — DICYCLOMINE HYDROCHLORIDE 10 MG: 10 CAPSULE ORAL at 12:09

## 2022-04-01 RX ADMIN — DOCUSATE SODIUM 100 MG: 100 CAPSULE ORAL at 20:29

## 2022-04-01 RX ADMIN — ACETAMINOPHEN 650 MG: 325 TABLET ORAL at 20:29

## 2022-04-01 RX ADMIN — Medication 10 ML: at 20:30

## 2022-04-01 RX ADMIN — ACETAMINOPHEN 650 MG: 325 TABLET ORAL at 23:15

## 2022-04-01 RX ADMIN — ENOXAPARIN SODIUM 40 MG: 40 INJECTION SUBCUTANEOUS at 14:25

## 2022-04-01 NOTE — PROGRESS NOTES
Problem: Mobility Impaired (Adult and Pediatric)  Goal: *Acute Goals and Plan of Care (Insert Text)  Description: FUNCTIONAL STATUS PRIOR TO ADMISSION: Patient was independent and active without use of DME.    HOME SUPPORT PRIOR TO ADMISSION: The patient lived alone with wife to provide assistance. Physical Therapy Goals  Initiated 4/1/2022  1. Patient will move from supine to sit and sit to supine  in bed with independence within 7 day(s). 2.  Patient will transfer from bed to chair and chair to bed with independence using the least restrictive device within 7 day(s). 3.  Patient will perform sit to stand with independence within 7 day(s). 4.  Patient will ambulate with modified independence for 100 feet with the least restrictive device within 7 day(s). 5.  Patient will ascend/descend 1 step with minimal assistance/contact guard assist within 7 day(s). 4/1/2022 1303 by Giovanny Rothman, HILARIO  Outcome: Progressing Towards Goal    PHYSICAL THERAPY EVALUATION  Patient: Ammy Yo (66 y.o. male)  Date: 4/1/2022  Primary Diagnosis: S/P exploratory laparotomy [Z98.890]  Procedure(s) (LRB):  LAPAROTOMY EXPLORATORY; SIGMOID COLECTOMY (N/A) 2 Days Post-Op   Precautions: Droplet plus       ASSESSMENT  Based on the objective data described below, the patient presents with decreased strength, decreased mobility, and assistance needed for safe mobility. He was recently diagnosed with Covid, and is POD 1 for exploratory lap. Performed transfer training for out of bed and sit <> stand, he needs cueing to control eccentric into the chair. Performed gait training with the rolling walker which he stated \"felt more secure\". He requires min assist for steering. He is moving well POD 1 and should continue to progress to discharge home. He may not need te rolling walker at discharge.       Current Level of Function Impacting Discharge (mobility/balance): min assist with gait and transfers    Functional Outcome Measure: The patient scored 55 out of 100 on the Barthel outcome measure      Other factors to consider for discharge: was very active     Patient will benefit from skilled therapy intervention to address the above noted impairments. PLAN :  Recommendations and Planned Interventions: bed mobility training, transfer training, gait training, and therapeutic exercises      Frequency/Duration: Patient will be followed by physical therapy:  5 times a week to address goals. Recommendation for discharge: (in order for the patient to meet his/her long term goals)  To be determined: patient should continue to improve    This discharge recommendation:  Has not yet been discussed the attending provider and/or case management    IF patient discharges home will need the following DME: Likely will not need any at discharge, currently using rw         SUBJECTIVE:   Patient stated How long sould I sit up? Sinan Dawkins    OBJECTIVE DATA SUMMARY:   HISTORY:    Past Medical History:   Diagnosis Date    Arthritis     knees liliya    Asthma     Empyema of lung ([facility] [address] 2/2013    Hypercholesteremia     Hypertension     Hypertrophy of prostate without urinary obstruction and other lower urinary tract symptoms (LUTS)     Skin cancer     Sun-damaged skin     Type II or unspecified type diabetes mellitus without mention of complication, not stated as uncontrolled 2/27/2013     Past Surgical History:   Procedure Laterality Date    HX APPENDECTOMY      when i was a kid    HX HEENT      cataracts removed bilat.     HX KNEE REPLACEMENT  01/25/14    right knee    HX ORTHOPAEDIC  2007    TKR Left    HX OTHER SURGICAL  2/8/2013    LVATS, pulmonary decortication       Personal factors and/or comorbidities impacting plan of care: none    Home Situation  Patient Expects to be Discharged to[de-identified]  (home)    EXAMINATION/PRESENTATION/DECISION MAKING:   Critical Behavior:              Hearing:       Range Of Motion:  AROM: Within functional limits           PROM: Within functional limits           Strength:    Strength: Generally decreased, functional                    Tone & Sensation:   Tone: Normal                              Coordination:  Coordination: Within functional limits  Vision:      Functional Mobility:  Bed Mobility:  Rolling: Contact guard assistance  Supine to Sit: Minimum assistance;Assist x1;Additional time; Adaptive equipment        Transfers:  Sit to Stand: Minimum assistance;Assist x1;Additional time  Stand to Sit: Minimum assistance;Assist x1                       Balance:   Sitting: Intact  Standing: Intact; With support  Ambulation/Gait Training:  Distance (ft): 40 Feet (ft)  Assistive Device: Walker, rolling;Gait belt  Ambulation - Level of Assistance: Minimal assistance;Assist x1;Additional time                              Functional Measure:  Barthel Index:    Bathin  Bladder: 10  Bowels: 10  Groomin  Dressin  Feeding: 10  Mobility: 0  Stairs: 0  Toilet Use: 5  Transfer (Bed to Chair and Back): 10  Total: 55/100       The Barthel ADL Index: Guidelines  1. The index should be used as a record of what a patient does, not as a record of what a patient could do. 2. The main aim is to establish degree of independence from any help, physical or verbal, however minor and for whatever reason. 3. The need for supervision renders the patient not independent. 4. A patient's performance should be established using the best available evidence. Asking the patient, friends/relatives and nurses are the usual sources, but direct observation and common sense are also important. However direct testing is not needed. 5. Usually the patient's performance over the preceding 24-48 hours is important, but occasionally longer periods will be relevant. 6. Middle categories imply that the patient supplies over 50 per cent of the effort. 7. Use of aids to be independent is allowed.     Score Interpretation (from [address] [place] [address])    Independent   60-79 Minimally independent   40-59 Partially dependent   20-39 Very dependent   <20 Totally dependent     -Antonella Urena, Barthel, D.W. (9093). Functional evaluation: the Barthel Index. [facility] ([address] (1997). The Barthel activities of daily living index: self-reporting versus actual performance in the old (> or = 75 years). Journal of [address] [place] [address] 45(7), [facility], EBENEZER, Sabrina Baker., Sana Reid. (1999). Measuring the change in disability after inpatient rehabilitation; comparison of the responsiveness of the Barthel Index and Functional Hughes Measure. Journal of Neurology, Neurosurgery, and Psychiatry, 66(4), 739-529. WILLIAM Sevilla, LYNN Malloy, & Lauren Marie MJulioA. (2004) Assessment of post-stroke quality of life in cost-effectiveness studies: The usefulness of the Barthel Index and the EuroQoL-5D. Quality of Life Research, 15, 586-68           Physical Therapy Evaluation Charge Determination   History Examination Presentation Decision-Making   MEDIUM  Complexity : 1-2 comorbidities / personal factors will impact the outcome/ POC  MEDIUM Complexity : 3 Standardized tests and measures addressing body structure, function, activity limitation and / or participation in recreation  LOW Complexity : Stable, uncomplicated  LOW Complexity : FOTO score of       Based on the above components, the patient evaluation is determined to be of the following complexity level: LOW     Pain Rating:  My stomach hurts only when I move    Activity Tolerance:   Good    After treatment patient left in no apparent distress:   Sitting in chair, Call bell within reach, and Bed / chair alarm activated    COMMUNICATION/EDUCATION:   The patients plan of care was discussed with: Registered nurse.      Fall prevention education was provided and the patient/caregiver indicated understanding., Patient/family have participated as able in goal setting and plan of care. , and Patient/family agree to work toward stated goals and plan of care.     Thank you for this referral.  Keenan Robles, PT   Time Calculation: 28 mins

## 2022-04-01 NOTE — PROGRESS NOTES
4/1/2022   CARE MANAGEMENT NOTE:  Pt transferred from ICU to the 5th floor. EMR reviewed and handoff received from previous  (Kathy). Pt was admitted with perforated diverticulitis; also Covid positive. Reportedly, pt resides with his wife Leanna Dong ([phone]). RUR 10%    Transition Plan of Care:  1. General Surgery is following for medical management - pt is s/p ex lap; sigmoid colectomy on 3/30  2. Plan is for pt to return home with his wife  3. Home oxygen eval prior to discharge  4. Outpt f/u  5. Wife will transport pt home    CM will continue to follow pt until discharged.   Nika

## 2022-04-01 NOTE — CONSULTS
[address] [facility] Adult  Hospitalist Group                                                                                          Hospitalist Progress Note  Guru Herron MD  Answering service: [phone] from in house phone        Date of Service:  2022  NAME:  Chano Wagoner  :  3/26/1933  MRN:  [id number]      Admission Summary: \"Leon Krishnan is a 80 y.o. male with past medical history notable for bronchiectasis, hyperlipidemia, asthma, history of empyema, hypertension, hypercholesterolemia, lower urinary tract symptoms, BPH (on flomax), impaired fasting glucose, recently diagnosed with COVID-19 3/22/2022 presenting with abdominal pain over the past evening.  He states that he was diagnosed with Covid after feeling generally unwell for several days.  This was over 9 days ago. [facility] reports he started having abdominal pain yesterday which was not improving, but actually getting worse. Denies any nausea or vomiting, denies any history of constipation. Reports his last colonoscopy was when he was around 86y/o. CT done is consistent with perforated sigmoid, likely from diverticulitis with free air and 2 collections adjacent to sigmoid. His WBC is 12. COVID test is positive\"    Interval history / Subjective:   3/30/2022. Saw patient this morning, awake alert and oriented, appears weak, stating that he is trying to get better. Does not appear to be in any apparent distress. 3/21/2022. No acute events overnight. Saw patient this morning, awake alert oriented in no apparent distress, has not had a bowel movement, passing flatus, denies any fever, chills, nausea, vomiting. Abdominal pain is tolerable. 2022. No acute events overnight. Saw patient this morning, complaining of abdominal distention, not passing flatus or having bowel movement, neuro denies any fever, chills, nausea, vomiting.      Assessment & Plan:     Perforated sigmoid colon  Status post exploratory laparoscopy  Will defer management to surgical team.    COPD  Does not appear to be acutely exacerbated. Continue LAMA, LABA. As needed DuoNebs, supplemental oxygen, flutter valve and pulmonary toileting. Dyslipidemia   Continue statins. BPH   Asymptomatic, continue finasteride. Monitor for urinary retention. Hypertension   Euvolemic, SBP on the soft side. Continue losartan, hold if MAP less than 65. COVID-19 positive  Self recent COVID-19 infection. Patient is still tested positive for COVID-19. Symptomatic. Continue supportive measures. Code status: Full code  DVT prophylaxis: SCD    Care Plan discussed with: Patient/Family  Anticipated Disposition: Home w/Family  Anticipated Discharge: Less than 24 hours     Hospital Problems  Date Reviewed: 9/25/2019          Codes Class Noted POA    S/P exploratory laparotomy ICD-10-CM: Z98.890  ICD-9-CM: V45.89  3/30/2022 Unknown                Review of Systems:   A comprehensive review of systems was negative except for that written in the HPI. Vital Signs:    Last 24hrs VS reviewed since prior progress note. Most recent are:  Visit Vitals  /69 (BP 1 Location: Left upper arm, BP Patient Position: At rest;Lying)   Pulse 94   Temp 98.3 °F (36.8 °C)   Resp 18   Ht 5' 5\" (1.651 m)   Wt 69 kg (152 lb 1.9 oz)   SpO2 96%   BMI 25.31 kg/m²         Intake/Output Summary (Last 24 hours) at 4/1/2022 1434  Last data filed at 4/1/2022 1025  Gross per 24 hour   Intake 997.5 ml   Output 630 ml   Net 367.5 ml        Physical Examination:     I had a face to face encounter with this patient and independently examined them on 4/1/2022 as outlined below:          Constitutional:  No acute distress, cooperative, pleasant    ENT:  Oral mucosa moist, oropharynx benign. Resp:  CTA bilaterally. No wheezing/rhonchi/rales. No accessory muscle use   CV:  Regular rhythm, normal rate, no murmurs, gallops, rubs    GI:  Soft, distended, non tender.  normoactive bowel sounds, no hepatosplenomegaly     Musculoskeletal:  No edema, warm, 2+ pulses throughout    Neurologic:  Moves all extremities. AAOx3, CN II-XII reviewed            Data Review:    Review and/or order of clinical lab test      Labs:     Recent Labs     04/01/22 0537 03/31/22 0213   WBC 16.5* 13.2*   HGB 11.0* 10.6*   HCT 33.4* 32.6*    232     Recent Labs     04/01/22 0537 03/31/22 0213 03/30/22  0128   * 137 137   K 4.0 4.3 4.0    105 101   CO2 27 28 29   BUN 14 18 21*   CREA 0.65* 0.86 0.95   GLU 83 139* 187*   CA 8.6 7.8* 9.0     Recent Labs     04/01/22 0537 03/31/22 0213 03/30/22 0128   ALT 15 10* 20   AP 48 39* 57   TBILI 0.6 0.8 0.7   TP 5.6* 5.1* 7.2   ALB 1.6* 1.6* 2.7*   GLOB 4.0 3.5 4.5*   LPSE  --   --  71*     No results for input(s): INR, PTP, APTT, INREXT, INREXT in the last 72 hours. No results for input(s): FE, TIBC, PSAT, FERR in the last 72 hours. No results found for: FOL, RBCF   No results for input(s): PH, PCO2, PO2 in the last 72 hours. No results for input(s): CPK, CKNDX, TROIQ in the last 72 hours.     No lab exists for component: CPKMB  Lab Results   Component Value Date/Time    Cholesterol, total 108 05/31/2013 10:42 AM    HDL Cholesterol 57 05/31/2013 10:42 AM    LDL, calculated 41 05/31/2013 10:42 AM    Triglyceride 48 05/31/2013 10:42 AM    CHOL/HDL Ratio 2.4 02/02/2010 09:20 AM     Lab Results   Component Value Date/Time    Glucose (POC) 179 (H) 03/30/2022 10:14 AM    Glucose (POC) 117 (H) 01/27/2014 10:12 AM    Glucose (POC) 154 (H) 01/27/2014 06:47 AM    Glucose (POC) 177 (H) 04/03/2013 04:25 PM    Glucose (POC) 154 (H) 02/14/2013 11:42 AM     Lab Results   Component Value Date/Time    Color YELLOW/STRAW 03/30/2022 01:28 AM    Appearance CLEAR 03/30/2022 01:28 AM    Specific gravity 1.025 03/30/2022 01:28 AM    Specific gravity 1.010 09/07/2017 09:46 AM    pH (UA) 6.0 03/30/2022 01:28 AM    Protein 30 (A) 03/30/2022 01:28 AM    Glucose Negative 03/30/2022 01:28 AM    Ketone Negative 03/30/2022 01:28 AM    Bilirubin Negative 03/30/2022 01:28 AM    Urobilinogen 0.2 03/30/2022 01:28 AM    Nitrites Negative 03/30/2022 01:28 AM    Leukocyte Esterase Negative 03/30/2022 01:28 AM    Epithelial cells FEW 03/30/2022 01:28 AM    Bacteria 2+ (A) 03/30/2022 01:28 AM    WBC 0-4 03/30/2022 01:28 AM    RBC 20-50 03/30/2022 01:28 AM         Medications Reviewed:     Current Facility-Administered Medications   Medication Dose Route Frequency    docusate sodium (COLACE) capsule 100 mg  100 mg Oral BID    dicyclomine (BENTYL) capsule 10 mg  10 mg Oral QID    enoxaparin (LOVENOX) injection 40 mg  40 mg SubCUTAneous Q24H    levoFLOXacin (LEVAQUIN) 750 mg in D5W IVPB  750 mg IntraVENous Q24H    metroNIDAZOLE (FLAGYL) IVPB premix 500 mg  500 mg IntraVENous Q12H    lactated Ringers infusion  75 mL/hr IntraVENous CONTINUOUS    sodium chloride (NS) flush 5-40 mL  5-40 mL IntraVENous Q8H    oxyCODONE IR (ROXICODONE) tablet 5 mg  5 mg Oral Q4H PRN    acetaminophen (TYLENOL) tablet 650 mg  650 mg Oral Q6H    finasteride (PROSCAR) tablet 5 mg  5 mg Oral DAILY    losartan (COZAAR) tablet 100 mg  100 mg Oral DAILY    tamsulosin (FLOMAX) capsule 0.4 mg  0.4 mg Oral DAILY    budesonide-formoterol (SYMBICORT) 80-4.5 mcg inhaler  2 Puff Inhalation BID RT    albuterol (PROVENTIL HFA, VENTOLIN HFA, PROAIR HFA) inhaler 2 Puff  2 Puff Inhalation Q4H PRN    morphine injection 2 mg  2 mg IntraVENous Q4H PRN     ______________________________________________________________________  EXPECTED LENGTH OF STAY: 3d 7h  ACTUAL LENGTH OF STAY:          2                 Jorge Handley MD

## 2022-04-01 NOTE — PROGRESS NOTES
SURGERY PROGRESS NOTE      Admit Date: 3/30/2022    POD 2 Days Post-Op    Procedure: Procedure(s):  LAPAROTOMY EXPLORATORY; SIGMOID COLECTOMY      Subjective:   feels well  tolerating diet  No flatus or BM      Objective:     Visit Vitals  /69 (BP 1 Location: Left upper arm, BP Patient Position: At rest;Lying)   Pulse 94   Temp 98.3 °F (36.8 °C)   Resp 18   Ht 5' 5\" (1.651 m)   Wt 69 kg (152 lb 1.9 oz)   SpO2 96%   BMI 25.31 kg/m²        Temp (24hrs), Av.3 °F (36.8 °C), Min:98 °F (36.7 °C), Max:98.4 °F (36.9 °C)      Physical Exam:     Abdomen:  Soft. Non-tender, distended upper abdomen.   Incision C/D/I. CINDY serous        Lab Results   Component Value Date/Time    WBC 16.5 (H) 2022 05:37 AM    HGB 11.0 (L) 2022 05:37 AM    HCT 33.4 (L) 2022 05:37 AM    PLATELET 726  05:37 AM    MCV 88.4 2022 05:37 AM     Lab Results   Component Value Date/Time    GFR est non-AA >60 2022 05:37 AM    GFR est AA >60 2022 05:37 AM    Creatinine 0.65 (L) 2022 05:37 AM    BUN 14 2022 05:37 AM    Sodium 133 (L) 2022 05:37 AM    Potassium 4.0 2022 05:37 AM    Chloride 102 2022 05:37 AM    CO2 27 2022 05:37 AM    Magnesium 2.0 2017 08:17 AM    Phosphorus 2.6 2013 05:45 AM       Assessment:     Active Problems:    S/P exploratory laparotomy (3/30/2022)        Plan/Recommendations/Medical Decision Making:   Continue IV abx  Leave on fulls today  OOB ambulate

## 2022-04-01 NOTE — PROGRESS NOTES
Patient notified that wallet and wedding band are in security      [id number]  Bedside and Verbal shift change report given to Travis Samuel (oncoming nurse) by Dang Jamison (offgoing nurse). Report included the following information SBAR, Kardex, Procedure Summary, Intake/Output, MAR and Recent Results.

## 2022-04-01 NOTE — PROGRESS NOTES
TRANSFER - IN REPORT:    Verbal report received from Carrol(name) on Jen Kothari  being received from ICU(unit) for routine progression of care      Report consisted of patients Situation, Background, Assessment and   Recommendations(SBAR). Information from the following report(s) SBAR, Kardex, Procedure Summary, Intake/Output, MAR and Recent Results was reviewed with the receiving nurse. Opportunity for questions and clarification was provided. Assessment completed upon patients arrival to unit and care assumed.

## 2022-04-02 ENCOUNTER — APPOINTMENT (OUTPATIENT)
Dept: GENERAL RADIOLOGY | Age: 87
DRG: 329 | End: 2022-04-02
Attending: INTERNAL MEDICINE
Payer: MEDICARE

## 2022-04-02 LAB
ALBUMIN SERPL-MCNC: 1.6 G/DL (ref 3.5–5)
ALBUMIN/GLOB SERPL: 0.4 {RATIO} (ref 1.1–2.2)
ALP SERPL-CCNC: 49 U/L (ref 45–117)
ALT SERPL-CCNC: 13 U/L (ref 12–78)
ANION GAP SERPL CALC-SCNC: 6 MMOL/L (ref 5–15)
AST SERPL-CCNC: 17 U/L (ref 15–37)
BILIRUB SERPL-MCNC: 0.6 MG/DL (ref 0.2–1)
BUN SERPL-MCNC: 18 MG/DL (ref 6–20)
BUN/CREAT SERPL: 28 (ref 12–20)
CALCIUM SERPL-MCNC: 8.8 MG/DL (ref 8.5–10.1)
CHLORIDE SERPL-SCNC: 99 MMOL/L (ref 97–108)
CO2 SERPL-SCNC: 26 MMOL/L (ref 21–32)
CREAT SERPL-MCNC: 0.65 MG/DL (ref 0.7–1.3)
ERYTHROCYTE [DISTWIDTH] IN BLOOD BY AUTOMATED COUNT: 12.7 % (ref 11.5–14.5)
GLOBULIN SER CALC-MCNC: 3.8 G/DL (ref 2–4)
GLUCOSE SERPL-MCNC: 79 MG/DL (ref 65–100)
HCT VFR BLD AUTO: 31.7 % (ref 36.6–50.3)
HGB BLD-MCNC: 10.5 G/DL (ref 12.1–17)
MAGNESIUM SERPL-MCNC: 2 MG/DL (ref 1.6–2.4)
MCH RBC QN AUTO: 28.9 PG (ref 26–34)
MCHC RBC AUTO-ENTMCNC: 33.1 G/DL (ref 30–36.5)
MCV RBC AUTO: 87.3 FL (ref 80–99)
NRBC # BLD: 0 K/UL (ref 0–0.01)
NRBC BLD-RTO: 0 PER 100 WBC
PLATELET # BLD AUTO: 333 K/UL (ref 150–400)
PMV BLD AUTO: 9.1 FL (ref 8.9–12.9)
POTASSIUM SERPL-SCNC: 3.9 MMOL/L (ref 3.5–5.1)
PROT SERPL-MCNC: 5.4 G/DL (ref 6.4–8.2)
RBC # BLD AUTO: 3.63 M/UL (ref 4.1–5.7)
SODIUM SERPL-SCNC: 131 MMOL/L (ref 136–145)
WBC # BLD AUTO: 14.2 K/UL (ref 4.1–11.1)

## 2022-04-02 PROCEDURE — 36415 COLL VENOUS BLD VENIPUNCTURE: CPT

## 2022-04-02 PROCEDURE — 74011250637 HC RX REV CODE- 250/637: Performed by: NURSE PRACTITIONER

## 2022-04-02 PROCEDURE — 74011250636 HC RX REV CODE- 250/636: Performed by: STUDENT IN AN ORGANIZED HEALTH CARE EDUCATION/TRAINING PROGRAM

## 2022-04-02 PROCEDURE — 83735 ASSAY OF MAGNESIUM: CPT

## 2022-04-02 PROCEDURE — 94761 N-INVAS EAR/PLS OXIMETRY MLT: CPT

## 2022-04-02 PROCEDURE — 74011250637 HC RX REV CODE- 250/637: Performed by: SURGERY

## 2022-04-02 PROCEDURE — 74011250636 HC RX REV CODE- 250/636: Performed by: NURSE PRACTITIONER

## 2022-04-02 PROCEDURE — 77010033678 HC OXYGEN DAILY

## 2022-04-02 PROCEDURE — 74011000250 HC RX REV CODE- 250: Performed by: ANESTHESIOLOGY

## 2022-04-02 PROCEDURE — 65270000029 HC RM PRIVATE

## 2022-04-02 PROCEDURE — 85027 COMPLETE CBC AUTOMATED: CPT

## 2022-04-02 PROCEDURE — 74018 RADEX ABDOMEN 1 VIEW: CPT

## 2022-04-02 PROCEDURE — 94664 DEMO&/EVAL PT USE INHALER: CPT

## 2022-04-02 PROCEDURE — 74011250636 HC RX REV CODE- 250/636: Performed by: SURGERY

## 2022-04-02 PROCEDURE — 94640 AIRWAY INHALATION TREATMENT: CPT

## 2022-04-02 PROCEDURE — 77030037878 HC DRSG MEPILEX >48IN BORD MOLN -B

## 2022-04-02 PROCEDURE — 74011250637 HC RX REV CODE- 250/637: Performed by: INTERNAL MEDICINE

## 2022-04-02 PROCEDURE — 80053 COMPREHEN METABOLIC PANEL: CPT

## 2022-04-02 RX ORDER — ONDANSETRON 2 MG/ML
4 INJECTION INTRAMUSCULAR; INTRAVENOUS
Status: DISCONTINUED | OUTPATIENT
Start: 2022-04-02 | End: 2022-04-06 | Stop reason: HOSPADM

## 2022-04-02 RX ORDER — ONDANSETRON 2 MG/ML
4 INJECTION INTRAMUSCULAR; INTRAVENOUS ONCE
Status: COMPLETED | OUTPATIENT
Start: 2022-04-02 | End: 2022-04-02

## 2022-04-02 RX ORDER — LANOLIN ALCOHOL/MO/W.PET/CERES
3 CREAM (GRAM) TOPICAL
Status: DISCONTINUED | OUTPATIENT
Start: 2022-04-02 | End: 2022-04-06 | Stop reason: HOSPADM

## 2022-04-02 RX ADMIN — DOCUSATE SODIUM 100 MG: 100 CAPSULE ORAL at 10:01

## 2022-04-02 RX ADMIN — BUDESONIDE AND FORMOTEROL FUMARATE DIHYDRATE 2 PUFF: 80; 4.5 AEROSOL RESPIRATORY (INHALATION) at 21:47

## 2022-04-02 RX ADMIN — LEVOFLOXACIN 750 MG: 5 INJECTION, SOLUTION INTRAVENOUS at 10:00

## 2022-04-02 RX ADMIN — Medication 10 ML: at 06:48

## 2022-04-02 RX ADMIN — TAMSULOSIN HYDROCHLORIDE 0.4 MG: 0.4 CAPSULE ORAL at 10:02

## 2022-04-02 RX ADMIN — METRONIDAZOLE 500 MG: 500 INJECTION, SOLUTION INTRAVENOUS at 04:01

## 2022-04-02 RX ADMIN — ACETAMINOPHEN 650 MG: 325 TABLET ORAL at 13:54

## 2022-04-02 RX ADMIN — ONDANSETRON 4 MG: 2 INJECTION INTRAMUSCULAR; INTRAVENOUS at 10:02

## 2022-04-02 RX ADMIN — Medication 3 MG: at 22:20

## 2022-04-02 RX ADMIN — SODIUM CHLORIDE, POTASSIUM CHLORIDE, SODIUM LACTATE AND CALCIUM CHLORIDE 75 ML/HR: 600; 310; 30; 20 INJECTION, SOLUTION INTRAVENOUS at 01:09

## 2022-04-02 RX ADMIN — LOSARTAN POTASSIUM 100 MG: 50 TABLET, FILM COATED ORAL at 10:01

## 2022-04-02 RX ADMIN — DICYCLOMINE HYDROCHLORIDE 10 MG: 10 CAPSULE ORAL at 22:20

## 2022-04-02 RX ADMIN — Medication 10 ML: at 22:20

## 2022-04-02 RX ADMIN — ENOXAPARIN SODIUM 40 MG: 40 INJECTION SUBCUTANEOUS at 13:54

## 2022-04-02 RX ADMIN — FINASTERIDE 5 MG: 5 TABLET, FILM COATED ORAL at 10:02

## 2022-04-02 RX ADMIN — SODIUM CHLORIDE, POTASSIUM CHLORIDE, SODIUM LACTATE AND CALCIUM CHLORIDE 75 ML/HR: 600; 310; 30; 20 INJECTION, SOLUTION INTRAVENOUS at 17:40

## 2022-04-02 RX ADMIN — BUDESONIDE AND FORMOTEROL FUMARATE DIHYDRATE 2 PUFF: 80; 4.5 AEROSOL RESPIRATORY (INHALATION) at 09:16

## 2022-04-02 RX ADMIN — DICYCLOMINE HYDROCHLORIDE 10 MG: 10 CAPSULE ORAL at 10:01

## 2022-04-02 RX ADMIN — DOCUSATE SODIUM 100 MG: 100 CAPSULE ORAL at 17:37

## 2022-04-02 RX ADMIN — METRONIDAZOLE 500 MG: 500 INJECTION, SOLUTION INTRAVENOUS at 17:37

## 2022-04-02 RX ADMIN — DICYCLOMINE HYDROCHLORIDE 10 MG: 10 CAPSULE ORAL at 13:54

## 2022-04-02 RX ADMIN — DICYCLOMINE HYDROCHLORIDE 10 MG: 10 CAPSULE ORAL at 17:37

## 2022-04-02 NOTE — ACP (ADVANCE CARE PLANNING)
Advance Care Planning   Advance Care Planning Inpatient Note  [address] Department    Today's Date: 4/2/2022  Unit: [facility]  MED SURG 2    Received request from admission screening. Upon review of chart and communication with care team, patient's decision making abilities are not in question. Unable to visit patient due to Covid isolation precautions. was/were present in the room during visit. Goals of ACP Conversation:  Discuss Advance Care planning documents  Facilitate a discussion related to patient's goals of care as they align with the patient's values and beliefs    Health Care Decision Makers:    No healthcare decision makers have been documented. Click here to complete [address] including selection of the Healthcare Decision Maker Relationship (ie \"Primary\")    Summary:  No Decision Maker named by patient at this time    Advance Care Planning Documents (Patient Wishes) on file: An AMD which does not name a MPOA     Assessment:    Attempted a visit with pt in response to an In-Basket request to assist with an Advance Medical Directive (AMD). Patient is on Covid isolation precautions. A call to his room phone went unanswered.  Spoke with his nurse who will deliver to the patient a blank AMD. He is being requested to have a  paged if he has questions or wishes to complete an AMD.    Interventions:  No conversation at this time    Care Preferences Communicated:  No    Outcomes/Plan:  No AMD completed at this time    [facility] on 4/2/2022 at 11:56 AM

## 2022-04-02 NOTE — PROGRESS NOTES
Problem: Risk for Spread of Infection  Goal: Prevent transmission of infectious organism to others  Description: Prevent the transmission of infectious organisms to other patients, staff members, and visitors. Outcome: Progressing Towards Goal     Problem: Patient Education:  Go to Education Activity  Goal: Patient/Family Education  Outcome: Progressing Towards Goal     Problem: Airway Clearance - Ineffective  Goal: Achieve or maintain patent airway  Outcome: Progressing Towards Goal     Problem: Gas Exchange - Impaired  Goal: Absence of hypoxia  Outcome: Progressing Towards Goal  Goal: Promote optimal lung function  Outcome: Progressing Towards Goal     Problem: Breathing Pattern - Ineffective  Goal: Ability to achieve and maintain a regular respiratory rate  Outcome: Progressing Towards Goal     Problem:  Body Temperature -  Risk of, Imbalanced  Goal: Ability to maintain a body temperature within defined limits  Outcome: Progressing Towards Goal  Goal: Will regain or maintain usual level of consciousness  Outcome: Progressing Towards Goal  Goal: Complications related to the disease process, condition or treatment will be avoided or minimized  Outcome: Progressing Towards Goal     Problem: Isolation Precautions - Risk of Spread of Infection  Goal: Prevent transmission of infectious organism to others  Outcome: Progressing Towards Goal     Problem: Nutrition Deficits  Goal: Optimize nutrtional status  Outcome: Progressing Towards Goal     Problem: Risk for Fluid Volume Deficit  Goal: Maintain normal heart rhythm  Outcome: Progressing Towards Goal  Goal: Maintain absence of muscle cramping  Outcome: Progressing Towards Goal  Goal: Maintain normal serum potassium, sodium, calcium, phosphorus, and pH  Outcome: Progressing Towards Goal     Problem: Loneliness or Risk for Loneliness  Goal: Demonstrate positive use of time alone when socialization is not possible  Outcome: Progressing Towards Goal     Problem: Fatigue  Goal: Verbalize increase energy and improved vitality  Outcome: Progressing Towards Goal     Problem: Patient Education: Go to Patient Education Activity  Goal: Patient/Family Education  Outcome: Progressing Towards Goal     Problem: Falls - Risk of  Goal: *Absence of Falls  Description: Document Nam Trevino Fall Risk and appropriate interventions in the flowsheet. Outcome: Progressing Towards Goal  Note: Fall Risk Interventions:  Mobility Interventions: Communicate number of staff needed for ambulation/transfer    Mentation Interventions: Adequate sleep, hydration, pain control    Medication Interventions: Evaluate medications/consider consulting pharmacy,Patient to call before getting OOB    Elimination Interventions: Call light in reach,Patient to call for help with toileting needs              Problem: Patient Education: Go to Patient Education Activity  Goal: Patient/Family Education  Outcome: Progressing Towards Goal     Problem: Pressure Injury - Risk of  Goal: *Prevention of pressure injury  Description: Document Brendan Scale and appropriate interventions in the flowsheet.   Outcome: Progressing Towards Goal  Note: Pressure Injury Interventions:  Sensory Interventions: Assess need for specialty bed    Moisture Interventions: Maintain skin hydration (lotion/cream),Minimize layers    Activity Interventions: Increase time out of bed    Mobility Interventions: Pressure redistribution bed/mattress (bed type)    Nutrition Interventions: Document food/fluid/supplement intake    Friction and Shear Interventions: Apply protective barrier, creams and emollients,Minimize layers                Problem: Patient Education: Go to Patient Education Activity  Goal: Patient/Family Education  Outcome: Progressing Towards Goal

## 2022-04-02 NOTE — PROGRESS NOTES
SURGERY PROGRESS NOTE      Admit Date: 3/30/2022    POD 3 Days Post-Op    Procedure: Procedure(s):  LAPAROTOMY EXPLORATORY; SIGMOID COLECTOMY      Subjective:   feels nauseous, but otherwise ok  No flatus or BM      Objective:     Visit Vitals  BP (!) 140/74 (BP 1 Location: Right upper arm, BP Patient Position: At rest)   Pulse 95   Temp 98.3 °F (36.8 °C)   Resp 16   Ht 5' 5\" (1.651 m)   Wt 69 kg (152 lb 1.9 oz)   SpO2 95%   BMI 25.31 kg/m²        Temp (24hrs), Av.5 °F (36.9 °C), Min:98.3 °F (36.8 °C), Max:99.2 °F (37.3 °C)      Physical Exam:     Abdomen:  Soft. Non-tender, distended upper abdomen.   Incision C/D/I. CINDY serous        Lab Results   Component Value Date/Time    WBC 14.2 (H) 2022 04:05 AM    HGB 10.5 (L) 2022 04:05 AM    HCT 31.7 (L) 2022 04:05 AM    PLATELET 121  04:05 AM    MCV 87.3 2022 04:05 AM     Lab Results   Component Value Date/Time    GFR est non-AA >60 2022 04:05 AM    GFR est AA >60 2022 04:05 AM    Creatinine 0.65 (L) 2022 04:05 AM    BUN 18 2022 04:05 AM    Sodium 131 (L) 2022 04:05 AM    Potassium 3.9 2022 04:05 AM    Chloride 99 2022 04:05 AM    CO2 26 2022 04:05 AM    Magnesium 2.0 2022 04:05 AM    Phosphorus 2.6 2013 05:45 AM       Assessment:     Active Problems:    S/P exploratory laparotomy (3/30/2022)        Plan/Recommendations/Medical Decision Making:   Continue IV abx  Leave on fulls today, added zofran  OOB ambulate

## 2022-04-02 NOTE — PROGRESS NOTES
Spiritual Care Assessment/Progress Note  [address]      NAME: Betty Mantilla      MRN: [id number]  AGE: 80 y.o. SEX: male  [employer] Affiliation: [employer]   Language: English     4/2/2022     Total Time (in minutes): 35     Spiritual Assessment begun in [facility]  MED SURG 2 through conversation with:         [x]Patient        [] Family    [] Friend(s)        Reason for Consult: Advance medical directive consult     Spiritual beliefs: (Please include comment if needed)     [] Identifies with a viridiana tradition:         [] Supported by a viridiana community:            [] Claims no spiritual orientation:           [] Seeking spiritual identity:                [] Adheres to an individual form of spirituality:           [x] Not able to assess:                           Identified resources for coping:      [] Prayer                               [] Music                  [] Guided Imagery     [] Family/friends                 [] Pet visits     [] Devotional reading                         [x] Unknown     [] Other:                                             Interventions offered during this visit: (See comments for more details)    Patient Interventions: Initial visit           Plan of Care:     [] Support spiritual and/or cultural needs    [] Support AMD and/or advance care planning process      [] Support grieving process   [] Coordinate Rites and/or Rituals    [] Coordination with community clergy   [] No spiritual needs identified at this time   [] Detailed Plan of Care below (See Comments)  [] Make referral to Music Therapy  [] Make referral to Pet Therapy     [] Make referral to Addiction services  [] Make referral to [facility]  [] Make referral to Spiritual Care Partner  [] No future visits requested        [x] Follow up visits as needed     Attempted a visit with pt in response to an In-Basket request to assist with an Advance Medical Directive (AMD). Patient is on Covid isolation precautions.  A call to his room phone went unanswered.  Spoke with his nurse who will deliver to the patient a blank AMD. He is being requested to have a  paged if he has questions or wishes to complete an AMD.  Chaplain Burkett MDiv, MS, [facility]

## 2022-04-02 NOTE — CONSULTS
[facility]  Hospitalist Group                                                                                          Hospitalist Progress Note  Jacquie Connelly MD  Answering service: [phone] from in house phone        Date of Service:  2022  NAME:  Corinne Human  :  3/26/1933  MRN:  [id number]      Admission Summary: \"Leon Garcia is a 80 y.o. male with past medical history notable for bronchiectasis, hyperlipidemia, asthma, history of empyema, hypertension, hypercholesterolemia, lower urinary tract symptoms, BPH (on flomax), impaired fasting glucose, recently diagnosed with COVID-19 3/22/2022 presenting with abdominal pain over the past evening.  He states that he was diagnosed with Covid after feeling generally unwell for several days.  This was over 9 days ago. [facility] reports he started having abdominal pain yesterday which was not improving, but actually getting worse. Denies any nausea or vomiting, denies any history of constipation. Reports his last colonoscopy was when he was around 86y/o. CT done is consistent with perforated sigmoid, likely from diverticulitis with free air and 2 collections adjacent to sigmoid. His WBC is 12. COVID test is positive\"    Interval history / Subjective:   3/30/2022. Saw patient this morning, awake alert and oriented, appears weak, stating that he is trying to get better. Does not appear to be in any apparent distress. 3/21/2022. No acute events overnight. Saw patient this morning, awake alert oriented in no apparent distress, has not had a bowel movement, passing flatus, denies any fever, chills, nausea, vomiting. Abdominal pain is tolerable. 2022. No acute events overnight. Saw patient this morning, complaining of abdominal distention, not passing flatus or having bowel movement, neuro denies any fever, chills, nausea, vomiting. 2022. No acute events overnight.   Saw patient this morning, awake alert and oriented, still complaining of distended abdomen and feeling nauseous, is not passing flatus but has not had a bowel movement. Denies any fever, chills, chest pain, abdominal pain, urinary symptoms. Assessment & Plan:     Perforated sigmoid colon  Status post exploratory laparoscopy  Will defer management to surgical team.    COPD  Does not appear to be acutely exacerbated. Continue LAMA, LABA. As needed DuoNebs, supplemental oxygen, flutter valve and pulmonary toileting. Dyslipidemia   Continue statins. BPH   Asymptomatic, continue finasteride. Monitor for urinary retention. Hypertension   Euvolemic, SBP on the soft side. Continue losartan, hold if MAP less than 65. COVID-19 positive  Self recent COVID-19 infection. Patient is still tested positive for COVID-19. Symptomatic. Continue supportive measures. Abdominal distention  Likely developing ileus postsurgical.  Pending KUB. Continue bowel rest.  Surgery on board. Code status: Full code  DVT prophylaxis: SCD    Care Plan discussed with: Patient/Family  Anticipated Disposition: Home w/Family  Anticipated Discharge: Less than 24 hours     Hospital Problems  Date Reviewed: 9/25/2019          Codes Class Noted POA    S/P exploratory laparotomy ICD-10-CM: Z98.890  ICD-9-CM: V45.89  3/30/2022 Unknown                Review of Systems:   A comprehensive review of systems was negative except for that written in the HPI. Vital Signs:    Last 24hrs VS reviewed since prior progress note.  Most recent are:  Visit Vitals  BP (!) 145/77   Pulse 98   Temp 98.5 °F (36.9 °C)   Resp 16   Ht 5' 5\" (1.651 m)   Wt 69 kg (152 lb 1.9 oz)   SpO2 95%   BMI 25.31 kg/m²         Intake/Output Summary (Last 24 hours) at 4/2/2022 1457  Last data filed at 4/2/2022 1225  Gross per 24 hour   Intake 601.25 ml   Output 485 ml   Net 116.25 ml        Physical Examination:     I had a face to face encounter with this patient and independently examined them on 4/2/2022 as outlined below:          Constitutional:  No acute distress, cooperative, pleasant    ENT:  Oral mucosa moist, oropharynx benign. Resp:  CTA bilaterally. No wheezing/rhonchi/rales. No accessory muscle use   CV:  Regular rhythm, normal rate, no murmurs, gallops, rubs    GI:  Soft, distended, hypoactive bowel sounds ,no hepatosplenomegaly     Musculoskeletal:  No edema, warm, 2+ pulses throughout    Neurologic:  Moves all extremities. AAOx3, CN II-XII reviewed            Data Review:    Review and/or order of clinical lab test      Labs:     Recent Labs     04/02/22 0405 04/01/22 0537   WBC 14.2* 16.5*   HGB 10.5* 11.0*   HCT 31.7* 33.4*    291     Recent Labs     04/02/22 0405 04/01/22 0537 03/31/22 0213   * 133* 137   K 3.9 4.0 4.3   CL 99 102 105   CO2 26 27 28   BUN 18 14 18   CREA 0.65* 0.65* 0.86   GLU 79 83 139*   CA 8.8 8.6 7.8*   MG 2.0  --   --      Recent Labs     04/02/22 0405 04/01/22 0537 03/31/22  0213   ALT 13 15 10*   AP 49 48 39*   TBILI 0.6 0.6 0.8   TP 5.4* 5.6* 5.1*   ALB 1.6* 1.6* 1.6*   GLOB 3.8 4.0 3.5     No results for input(s): INR, PTP, APTT, INREXT, INREXT in the last 72 hours. No results for input(s): FE, TIBC, PSAT, FERR in the last 72 hours. No results found for: FOL, RBCF   No results for input(s): PH, PCO2, PO2 in the last 72 hours. No results for input(s): CPK, CKNDX, TROIQ in the last 72 hours.     No lab exists for component: CPKMB  Lab Results   Component Value Date/Time    Cholesterol, total 108 05/31/2013 10:42 AM    HDL Cholesterol 57 05/31/2013 10:42 AM    LDL, calculated 41 05/31/2013 10:42 AM    Triglyceride 48 05/31/2013 10:42 AM    CHOL/HDL Ratio 2.4 02/02/2010 09:20 AM     Lab Results   Component Value Date/Time    Glucose (POC) 179 (H) 03/30/2022 10:14 AM    Glucose (POC) 117 (H) 01/27/2014 10:12 AM    Glucose (POC) 154 (H) 01/27/2014 06:47 AM    Glucose (POC) 177 (H) 04/03/2013 04:25 PM    Glucose (POC) 154 (H) 02/14/2013 11:42 AM Lab Results   Component Value Date/Time    Color YELLOW/STRAW 03/30/2022 01:28 AM    Appearance CLEAR 03/30/2022 01:28 AM    Specific gravity 1.025 03/30/2022 01:28 AM    Specific gravity 1.010 09/07/2017 09:46 AM    pH (UA) 6.0 03/30/2022 01:28 AM    Protein 30 (A) 03/30/2022 01:28 AM    Glucose Negative 03/30/2022 01:28 AM    Ketone Negative 03/30/2022 01:28 AM    Bilirubin Negative 03/30/2022 01:28 AM    Urobilinogen 0.2 03/30/2022 01:28 AM    Nitrites Negative 03/30/2022 01:28 AM    Leukocyte Esterase Negative 03/30/2022 01:28 AM    Epithelial cells FEW 03/30/2022 01:28 AM    Bacteria 2+ (A) 03/30/2022 01:28 AM    WBC 0-4 03/30/2022 01:28 AM    RBC 20-50 03/30/2022 01:28 AM         Medications Reviewed:     Current Facility-Administered Medications   Medication Dose Route Frequency    ondansetron (ZOFRAN) injection 4 mg  4 mg IntraVENous Q4H PRN    docusate sodium (COLACE) capsule 100 mg  100 mg Oral BID    dicyclomine (BENTYL) capsule 10 mg  10 mg Oral QID    enoxaparin (LOVENOX) injection 40 mg  40 mg SubCUTAneous Q24H    levoFLOXacin (LEVAQUIN) 750 mg in D5W IVPB  750 mg IntraVENous Q24H    metroNIDAZOLE (FLAGYL) IVPB premix 500 mg  500 mg IntraVENous Q12H    lactated Ringers infusion  75 mL/hr IntraVENous CONTINUOUS    sodium chloride (NS) flush 5-40 mL  5-40 mL IntraVENous Q8H    oxyCODONE IR (ROXICODONE) tablet 5 mg  5 mg Oral Q4H PRN    acetaminophen (TYLENOL) tablet 650 mg  650 mg Oral Q6H    finasteride (PROSCAR) tablet 5 mg  5 mg Oral DAILY    losartan (COZAAR) tablet 100 mg  100 mg Oral DAILY    tamsulosin (FLOMAX) capsule 0.4 mg  0.4 mg Oral DAILY    budesonide-formoterol (SYMBICORT) 80-4.5 mcg inhaler  2 Puff Inhalation BID RT    albuterol (PROVENTIL HFA, VENTOLIN HFA, PROAIR HFA) inhaler 2 Puff  2 Puff Inhalation Q4H PRN    morphine injection 2 mg  2 mg IntraVENous Q4H PRN     ______________________________________________________________________  EXPECTED LENGTH OF STAY: 3d 7h  ACTUAL LENGTH OF STAY:          3                 Jennifer Goff MD

## 2022-04-03 LAB
ALBUMIN SERPL-MCNC: 1.5 G/DL (ref 3.5–5)
ALBUMIN/GLOB SERPL: 0.4 {RATIO} (ref 1.1–2.2)
ALP SERPL-CCNC: 51 U/L (ref 45–117)
ALT SERPL-CCNC: 12 U/L (ref 12–78)
ANION GAP SERPL CALC-SCNC: 4 MMOL/L (ref 5–15)
AST SERPL-CCNC: 14 U/L (ref 15–37)
BILIRUB SERPL-MCNC: 0.4 MG/DL (ref 0.2–1)
BUN SERPL-MCNC: 14 MG/DL (ref 6–20)
BUN/CREAT SERPL: 23 (ref 12–20)
CALCIUM SERPL-MCNC: 8.5 MG/DL (ref 8.5–10.1)
CHLORIDE SERPL-SCNC: 100 MMOL/L (ref 97–108)
CO2 SERPL-SCNC: 29 MMOL/L (ref 21–32)
CREAT SERPL-MCNC: 0.6 MG/DL (ref 0.7–1.3)
ERYTHROCYTE [DISTWIDTH] IN BLOOD BY AUTOMATED COUNT: 12.8 % (ref 11.5–14.5)
GLOBULIN SER CALC-MCNC: 3.6 G/DL (ref 2–4)
GLUCOSE SERPL-MCNC: 91 MG/DL (ref 65–100)
HCT VFR BLD AUTO: 30.7 % (ref 36.6–50.3)
HGB BLD-MCNC: 10 G/DL (ref 12.1–17)
MCH RBC QN AUTO: 28.6 PG (ref 26–34)
MCHC RBC AUTO-ENTMCNC: 32.6 G/DL (ref 30–36.5)
MCV RBC AUTO: 87.7 FL (ref 80–99)
NRBC # BLD: 0 K/UL (ref 0–0.01)
NRBC BLD-RTO: 0 PER 100 WBC
PLATELET # BLD AUTO: 311 K/UL (ref 150–400)
PMV BLD AUTO: 8.6 FL (ref 8.9–12.9)
POTASSIUM SERPL-SCNC: 3.8 MMOL/L (ref 3.5–5.1)
PROT SERPL-MCNC: 5.1 G/DL (ref 6.4–8.2)
RBC # BLD AUTO: 3.5 M/UL (ref 4.1–5.7)
SODIUM SERPL-SCNC: 133 MMOL/L (ref 136–145)
WBC # BLD AUTO: 11 K/UL (ref 4.1–11.1)

## 2022-04-03 PROCEDURE — 65270000029 HC RM PRIVATE

## 2022-04-03 PROCEDURE — 94761 N-INVAS EAR/PLS OXIMETRY MLT: CPT

## 2022-04-03 PROCEDURE — 74011000250 HC RX REV CODE- 250: Performed by: ANESTHESIOLOGY

## 2022-04-03 PROCEDURE — 74011250637 HC RX REV CODE- 250/637: Performed by: NURSE PRACTITIONER

## 2022-04-03 PROCEDURE — 94640 AIRWAY INHALATION TREATMENT: CPT

## 2022-04-03 PROCEDURE — 74011250637 HC RX REV CODE- 250/637: Performed by: INTERNAL MEDICINE

## 2022-04-03 PROCEDURE — 74011250636 HC RX REV CODE- 250/636: Performed by: SURGERY

## 2022-04-03 PROCEDURE — 74011250637 HC RX REV CODE- 250/637: Performed by: SURGERY

## 2022-04-03 PROCEDURE — 77010033678 HC OXYGEN DAILY

## 2022-04-03 PROCEDURE — 80053 COMPREHEN METABOLIC PANEL: CPT

## 2022-04-03 PROCEDURE — 36415 COLL VENOUS BLD VENIPUNCTURE: CPT

## 2022-04-03 PROCEDURE — 94664 DEMO&/EVAL PT USE INHALER: CPT

## 2022-04-03 PROCEDURE — 74011250636 HC RX REV CODE- 250/636: Performed by: STUDENT IN AN ORGANIZED HEALTH CARE EDUCATION/TRAINING PROGRAM

## 2022-04-03 PROCEDURE — 85027 COMPLETE CBC AUTOMATED: CPT

## 2022-04-03 PROCEDURE — 74011250636 HC RX REV CODE- 250/636: Performed by: NURSE PRACTITIONER

## 2022-04-03 RX ORDER — OXYMETAZOLINE HCL 0.05 %
2 SPRAY, NON-AEROSOL (ML) NASAL 2 TIMES DAILY
Status: DISCONTINUED | OUTPATIENT
Start: 2022-04-03 | End: 2022-04-06 | Stop reason: HOSPADM

## 2022-04-03 RX ADMIN — DICYCLOMINE HYDROCHLORIDE 10 MG: 10 CAPSULE ORAL at 21:52

## 2022-04-03 RX ADMIN — BUDESONIDE AND FORMOTEROL FUMARATE DIHYDRATE 2 PUFF: 80; 4.5 AEROSOL RESPIRATORY (INHALATION) at 09:31

## 2022-04-03 RX ADMIN — SODIUM CHLORIDE, POTASSIUM CHLORIDE, SODIUM LACTATE AND CALCIUM CHLORIDE 75 ML/HR: 600; 310; 30; 20 INJECTION, SOLUTION INTRAVENOUS at 09:09

## 2022-04-03 RX ADMIN — Medication 2 SPRAY: at 18:06

## 2022-04-03 RX ADMIN — FINASTERIDE 5 MG: 5 TABLET, FILM COATED ORAL at 09:10

## 2022-04-03 RX ADMIN — METRONIDAZOLE 500 MG: 500 INJECTION, SOLUTION INTRAVENOUS at 18:06

## 2022-04-03 RX ADMIN — ACETAMINOPHEN 650 MG: 325 TABLET ORAL at 13:12

## 2022-04-03 RX ADMIN — LOSARTAN POTASSIUM 100 MG: 50 TABLET, FILM COATED ORAL at 09:09

## 2022-04-03 RX ADMIN — BUDESONIDE AND FORMOTEROL FUMARATE DIHYDRATE 2 PUFF: 80; 4.5 AEROSOL RESPIRATORY (INHALATION) at 20:41

## 2022-04-03 RX ADMIN — DOCUSATE SODIUM 100 MG: 100 CAPSULE ORAL at 09:10

## 2022-04-03 RX ADMIN — TAMSULOSIN HYDROCHLORIDE 0.4 MG: 0.4 CAPSULE ORAL at 09:09

## 2022-04-03 RX ADMIN — Medication 10 ML: at 06:50

## 2022-04-03 RX ADMIN — DOCUSATE SODIUM 100 MG: 100 CAPSULE ORAL at 18:06

## 2022-04-03 RX ADMIN — ONDANSETRON 4 MG: 2 INJECTION INTRAMUSCULAR; INTRAVENOUS at 18:06

## 2022-04-03 RX ADMIN — DICYCLOMINE HYDROCHLORIDE 10 MG: 10 CAPSULE ORAL at 13:12

## 2022-04-03 RX ADMIN — METRONIDAZOLE 500 MG: 500 INJECTION, SOLUTION INTRAVENOUS at 04:06

## 2022-04-03 RX ADMIN — DICYCLOMINE HYDROCHLORIDE 10 MG: 10 CAPSULE ORAL at 09:10

## 2022-04-03 RX ADMIN — Medication 3 MG: at 21:52

## 2022-04-03 RX ADMIN — ENOXAPARIN SODIUM 40 MG: 40 INJECTION SUBCUTANEOUS at 13:12

## 2022-04-03 RX ADMIN — LEVOFLOXACIN 750 MG: 5 INJECTION, SOLUTION INTRAVENOUS at 09:09

## 2022-04-03 RX ADMIN — DICYCLOMINE HYDROCHLORIDE 10 MG: 10 CAPSULE ORAL at 18:05

## 2022-04-03 NOTE — PROGRESS NOTES
SURGERY PROGRESS NOTE      Admit Date: 3/30/2022    POD 4 Days Post-Op    Procedure: Procedure(s):  LAPAROTOMY EXPLORATORY; SIGMOID COLECTOMY      Subjective:   feels nauseous, but otherwise ok  No flatus or BM    4/3 No nausea, but no appetite. +Flatus overnight, no BM. Feels better, just needs help to get out of bed. Objective:     Visit Vitals  BP (!) 156/74 (BP 1 Location: Right upper arm)   Pulse 70   Temp 98 °F (36.7 °C)   Resp 18   Ht 5' 5\" (1.651 m)   Wt 69 kg (152 lb 1.9 oz)   SpO2 94%   BMI 25.31 kg/m²        Temp (24hrs), Av.2 °F (36.8 °C), Min:97.6 °F (36.4 °C), Max:98.6 °F (37 °C)      Physical Exam:     Abdomen:  Soft. Non-tender, distended upper abdomen. Incision C/D/I. CINDY serous        Lab Results   Component Value Date/Time    WBC 11.0 2022 04:08 AM    HGB 10.0 (L) 2022 04:08 AM    HCT 30.7 (L) 2022 04:08 AM    PLATELET 326  04:08 AM    MCV 87.7 2022 04:08 AM     Lab Results   Component Value Date/Time    GFR est non-AA >60 2022 04:08 AM    GFR est AA >60 2022 04:08 AM    Creatinine 0.60 (L) 2022 04:08 AM    BUN 14 2022 04:08 AM    Sodium 133 (L) 2022 04:08 AM    Potassium 3.8 2022 04:08 AM    Chloride 100 2022 04:08 AM    CO2 29 2022 04:08 AM    Magnesium 2.0 2022 04:05 AM    Phosphorus 2.6 2013 05:45 AM       Assessment:     Active Problems:    S/P exploratory laparotomy (3/30/2022)        Plan/Recommendations/Medical Decision Making:   Continue IV abx x 24h more, leukocytosis resolved. Stay at fulls today, no appetite. Mobilize.

## 2022-04-03 NOTE — PROGRESS NOTES
Pt requested for something to help with sleep. On call MD ordered Melatonin PRN. Pt experienced nausea and refused Tylenol at 0000 and 0600. Pt stated he was in pain. BSR and SBAR given to Travis Samuel RN at 4074.

## 2022-04-03 NOTE — PROGRESS NOTES
1327 - Pt's /75. Attempted to contact Dr Jessenia Quinones. Awaiting call back. 46 - Dr. Wil Clark informed of pt's BP. States he will order something. 1423 - BP rechecked and is now 142/68.    1823 - Pt complaining of constipation and has not had BM since 3/31/22. Attempted to call Dr. Jessenia Quinones. Message left with no response. Messaged Dr. Wil Clark regarding adding miralax. Instructed to follow up with Dr. Jessenia Quinones due to being surgical pt. Nightshift nurse made aware.

## 2022-04-03 NOTE — CONSULTS
[facility]  Hospitalist Group                                                                                          Hospitalist Progress Note  Lucius Hilario MD  Answering service: [phone] from in house phone        Date of Service:  4/3/2022  NAME:  Pierre Ramirez  :  3/26/1933  MRN:  [id number]      Admission Summary: \"Leon Krishnamurthy is a 80 y.o. male with past medical history notable for bronchiectasis, hyperlipidemia, asthma, history of empyema, hypertension, hypercholesterolemia, lower urinary tract symptoms, BPH (on flomax), impaired fasting glucose, recently diagnosed with COVID-19 3/22/2022 presenting with abdominal pain over the past evening.  He states that he was diagnosed with Covid after feeling generally unwell for several days.  This was over 9 days ago. Caty Bernardo reports he started having abdominal pain yesterday which was not improving, but actually getting worse. Denies any nausea or vomiting, denies any history of constipation. Reports his last colonoscopy was when he was around 84y/o. CT done is consistent with perforated sigmoid, likely from diverticulitis with free air and 2 collections adjacent to sigmoid. His WBC is 12. COVID test is positive\"    Interval history / Subjective:   3/30/2022. Saw patient this morning, awake alert and oriented, appears weak, stating that he is trying to get better. Does not appear to be in any apparent distress. 3/21/2022. No acute events overnight. Saw patient this morning, awake alert oriented in no apparent distress, has not had a bowel movement, passing flatus, denies any fever, chills, nausea, vomiting. Abdominal pain is tolerable. 2022. No acute events overnight. Saw patient this morning, complaining of abdominal distention, not passing flatus or having bowel movement, neuro denies any fever, chills, nausea, vomiting. 2022. No acute events overnight.   Saw patient this morning, awake alert and oriented, still complaining of distended abdomen and feeling nauseous, is not passing flatus but has not had a bowel movement. Denies any fever, chills, chest pain, abdominal pain, urinary symptoms. 4/3/2022. No acute events overnight. Still complaining of abdominal distention however passing flatus, has not had a bowel movement, denies any fever, chills, nausea, vomiting. Endorsing low appetite. Assessment & Plan:     Perforated sigmoid colon  Status post exploratory laparoscopy  Will defer management to surgical team.    COPD  Does not appear to be acutely exacerbated. Continue LAMA, LABA. As needed DuoNebs, supplemental oxygen, flutter valve and pulmonary toileting. Dyslipidemia   Continue statins. BPH   Asymptomatic, continue finasteride. Monitor for urinary retention. Hypertension   Euvolemic, SBP on the soft side. Continue losartan, hold if MAP less than 65. COVID-19 positive  Self recent COVID-19 infection. Patient is still tested positive for COVID-19. Symptomatic. Continue supportive measures. Abdominal distention  Likely developing ileus postsurgical.  KUB 4/2/2022 shows improved ileus. Code status: Full code  DVT prophylaxis: SCD    Care Plan discussed with: Patient/Family  Anticipated Disposition: Home w/Family  Anticipated Discharge: Less than 24 hours     Hospital Problems  Date Reviewed: 9/25/2019          Codes Class Noted POA    S/P exploratory laparotomy ICD-10-CM: Z98.890  ICD-9-CM: V45.89  3/30/2022 Unknown                Review of Systems:   A comprehensive review of systems was negative except for that written in the HPI. Vital Signs:    Last 24hrs VS reviewed since prior progress note.  Most recent are:  Visit Vitals  BP (!) 157/77 (BP 1 Location: Right upper arm)   Pulse 76   Temp 98.2 °F (36.8 °C)   Resp 18   Ht 5' 5\" (1.651 m)   Wt 69 kg (152 lb 1.9 oz)   SpO2 96%   BMI 25.31 kg/m²         Intake/Output Summary (Last 24 hours) at 4/3/2022 Michael Fortune filed at 4/3/2022 7237  Gross per 24 hour   Intake 1112.5 ml   Output 1215 ml   Net -102.5 ml        Physical Examination:     I had a face to face encounter with this patient and independently examined them on 4/3/2022 as outlined below:          Constitutional:  No acute distress, cooperative, pleasant    ENT:  Oral mucosa moist, oropharynx benign. Resp:  CTA bilaterally. No wheezing/rhonchi/rales. No accessory muscle use   CV:  Regular rhythm, normal rate, no murmurs, gallops, rubs    GI:  Soft, distended, hypoactive bowel sounds ,no hepatosplenomegaly     Musculoskeletal:  No edema, warm, 2+ pulses throughout    Neurologic:  Moves all extremities. AAOx3, CN II-XII reviewed            Data Review:    Review and/or order of clinical lab test      Labs:     Recent Labs     04/03/22 0408 04/02/22  0405   WBC 11.0 14.2*   HGB 10.0* 10.5*   HCT 30.7* 31.7*    333     Recent Labs     04/03/22 0408 04/02/22  0405 04/01/22  0537   * 131* 133*   K 3.8 3.9 4.0    99 102   CO2 29 26 27   BUN 14 18 14   CREA 0.60* 0.65* 0.65*   GLU 91 79 83   CA 8.5 8.8 8.6   MG  --  2.0  --      Recent Labs     04/03/22 0408 04/02/22 0405 04/01/22  0537   ALT 12 13 15   AP 51 49 48   TBILI 0.4 0.6 0.6   TP 5.1* 5.4* 5.6*   ALB 1.5* 1.6* 1.6*   GLOB 3.6 3.8 4.0     No results for input(s): INR, PTP, APTT, INREXT, INREXT in the last 72 hours. No results for input(s): FE, TIBC, PSAT, FERR in the last 72 hours. No results found for: FOL, RBCF   No results for input(s): PH, PCO2, PO2 in the last 72 hours. No results for input(s): CPK, CKNDX, TROIQ in the last 72 hours.     No lab exists for component: CPKMB  Lab Results   Component Value Date/Time    Cholesterol, total 108 05/31/2013 10:42 AM    HDL Cholesterol 57 05/31/2013 10:42 AM    LDL, calculated 41 05/31/2013 10:42 AM    Triglyceride 48 05/31/2013 10:42 AM    CHOL/HDL Ratio 2.4 02/02/2010 09:20 AM     Lab Results   Component Value Date/Time Glucose (POC) 179 (H) 03/30/2022 10:14 AM    Glucose (POC) 117 (H) 01/27/2014 10:12 AM    Glucose (POC) 154 (H) 01/27/2014 06:47 AM    Glucose (POC) 177 (H) 04/03/2013 04:25 PM    Glucose (POC) 154 (H) 02/14/2013 11:42 AM     Lab Results   Component Value Date/Time    Color YELLOW/STRAW 03/30/2022 01:28 AM    Appearance CLEAR 03/30/2022 01:28 AM    Specific gravity 1.025 03/30/2022 01:28 AM    Specific gravity 1.010 09/07/2017 09:46 AM    pH (UA) 6.0 03/30/2022 01:28 AM    Protein 30 (A) 03/30/2022 01:28 AM    Glucose Negative 03/30/2022 01:28 AM    Ketone Negative 03/30/2022 01:28 AM    Bilirubin Negative 03/30/2022 01:28 AM    Urobilinogen 0.2 03/30/2022 01:28 AM    Nitrites Negative 03/30/2022 01:28 AM    Leukocyte Esterase Negative 03/30/2022 01:28 AM    Epithelial cells FEW 03/30/2022 01:28 AM    Bacteria 2+ (A) 03/30/2022 01:28 AM    WBC 0-4 03/30/2022 01:28 AM    RBC 20-50 03/30/2022 01:28 AM         Medications Reviewed:     Current Facility-Administered Medications   Medication Dose Route Frequency    ondansetron (ZOFRAN) injection 4 mg  4 mg IntraVENous Q4H PRN    melatonin tablet 3 mg  3 mg Oral QHS PRN    docusate sodium (COLACE) capsule 100 mg  100 mg Oral BID    dicyclomine (BENTYL) capsule 10 mg  10 mg Oral QID    enoxaparin (LOVENOX) injection 40 mg  40 mg SubCUTAneous Q24H    levoFLOXacin (LEVAQUIN) 750 mg in D5W IVPB  750 mg IntraVENous Q24H    metroNIDAZOLE (FLAGYL) IVPB premix 500 mg  500 mg IntraVENous Q12H    lactated Ringers infusion  75 mL/hr IntraVENous CONTINUOUS    sodium chloride (NS) flush 5-40 mL  5-40 mL IntraVENous Q8H    oxyCODONE IR (ROXICODONE) tablet 5 mg  5 mg Oral Q4H PRN    acetaminophen (TYLENOL) tablet 650 mg  650 mg Oral Q6H    finasteride (PROSCAR) tablet 5 mg  5 mg Oral DAILY    losartan (COZAAR) tablet 100 mg  100 mg Oral DAILY    tamsulosin (FLOMAX) capsule 0.4 mg  0.4 mg Oral DAILY    budesonide-formoterol (SYMBICORT) 80-4.5 mcg inhaler  2 Puff Inhalation BID RT    albuterol (PROVENTIL HFA, VENTOLIN HFA, PROAIR HFA) inhaler 2 Puff  2 Puff Inhalation Q4H PRN    morphine injection 2 mg  2 mg IntraVENous Q4H PRN     ______________________________________________________________________  EXPECTED LENGTH OF STAY: 3d 7h  ACTUAL LENGTH OF STAY:          4                 Evelyn Nguyen MD

## 2022-04-04 LAB
GLUCOSE BLD STRIP.AUTO-MCNC: 135 MG/DL (ref 65–117)
SERVICE CMNT-IMP: ABNORMAL

## 2022-04-04 PROCEDURE — 97530 THERAPEUTIC ACTIVITIES: CPT

## 2022-04-04 PROCEDURE — 74011250636 HC RX REV CODE- 250/636: Performed by: NURSE PRACTITIONER

## 2022-04-04 PROCEDURE — 74011250637 HC RX REV CODE- 250/637: Performed by: INTERNAL MEDICINE

## 2022-04-04 PROCEDURE — 77010033678 HC OXYGEN DAILY

## 2022-04-04 PROCEDURE — 97116 GAIT TRAINING THERAPY: CPT

## 2022-04-04 PROCEDURE — 74011000250 HC RX REV CODE- 250: Performed by: ANESTHESIOLOGY

## 2022-04-04 PROCEDURE — 65270000029 HC RM PRIVATE

## 2022-04-04 PROCEDURE — 74011250636 HC RX REV CODE- 250/636: Performed by: STUDENT IN AN ORGANIZED HEALTH CARE EDUCATION/TRAINING PROGRAM

## 2022-04-04 PROCEDURE — 74011250637 HC RX REV CODE- 250/637: Performed by: SURGERY

## 2022-04-04 PROCEDURE — 82962 GLUCOSE BLOOD TEST: CPT

## 2022-04-04 PROCEDURE — 74011250636 HC RX REV CODE- 250/636: Performed by: SURGERY

## 2022-04-04 PROCEDURE — 74011250637 HC RX REV CODE- 250/637: Performed by: NURSE PRACTITIONER

## 2022-04-04 PROCEDURE — 94761 N-INVAS EAR/PLS OXIMETRY MLT: CPT

## 2022-04-04 PROCEDURE — 94664 DEMO&/EVAL PT USE INHALER: CPT

## 2022-04-04 PROCEDURE — 94640 AIRWAY INHALATION TREATMENT: CPT

## 2022-04-04 RX ORDER — POLYETHYLENE GLYCOL 3350 17 G/17G
17 POWDER, FOR SOLUTION ORAL DAILY
Status: DISCONTINUED | OUTPATIENT
Start: 2022-04-04 | End: 2022-04-06 | Stop reason: HOSPADM

## 2022-04-04 RX ORDER — AMLODIPINE BESYLATE 5 MG/1
5 TABLET ORAL DAILY
Status: DISCONTINUED | OUTPATIENT
Start: 2022-04-04 | End: 2022-04-06 | Stop reason: HOSPADM

## 2022-04-04 RX ORDER — HYDRALAZINE HYDROCHLORIDE 20 MG/ML
10 INJECTION INTRAMUSCULAR; INTRAVENOUS
Status: DISCONTINUED | OUTPATIENT
Start: 2022-04-04 | End: 2022-04-06 | Stop reason: HOSPADM

## 2022-04-04 RX ADMIN — DICYCLOMINE HYDROCHLORIDE 10 MG: 10 CAPSULE ORAL at 11:39

## 2022-04-04 RX ADMIN — Medication 10 ML: at 11:41

## 2022-04-04 RX ADMIN — AMLODIPINE BESYLATE 5 MG: 5 TABLET ORAL at 11:39

## 2022-04-04 RX ADMIN — SODIUM CHLORIDE, POTASSIUM CHLORIDE, SODIUM LACTATE AND CALCIUM CHLORIDE 75 ML/HR: 600; 310; 30; 20 INJECTION, SOLUTION INTRAVENOUS at 03:08

## 2022-04-04 RX ADMIN — POLYETHYLENE GLYCOL 3350 17 G: 17 POWDER, FOR SOLUTION ORAL at 14:12

## 2022-04-04 RX ADMIN — DOCUSATE SODIUM 100 MG: 100 CAPSULE ORAL at 17:52

## 2022-04-04 RX ADMIN — DICYCLOMINE HYDROCHLORIDE 10 MG: 10 CAPSULE ORAL at 21:21

## 2022-04-04 RX ADMIN — Medication 10 ML: at 21:22

## 2022-04-04 RX ADMIN — ACETAMINOPHEN 650 MG: 325 TABLET ORAL at 00:00

## 2022-04-04 RX ADMIN — BUDESONIDE AND FORMOTEROL FUMARATE DIHYDRATE 2 PUFF: 80; 4.5 AEROSOL RESPIRATORY (INHALATION) at 21:34

## 2022-04-04 RX ADMIN — METRONIDAZOLE 500 MG: 500 INJECTION, SOLUTION INTRAVENOUS at 06:03

## 2022-04-04 RX ADMIN — Medication 3 MG: at 21:21

## 2022-04-04 RX ADMIN — ENOXAPARIN SODIUM 40 MG: 40 INJECTION SUBCUTANEOUS at 14:12

## 2022-04-04 RX ADMIN — ACETAMINOPHEN 650 MG: 325 TABLET ORAL at 06:02

## 2022-04-04 RX ADMIN — ACETAMINOPHEN 650 MG: 325 TABLET ORAL at 11:39

## 2022-04-04 RX ADMIN — DOCUSATE SODIUM 100 MG: 100 CAPSULE ORAL at 11:08

## 2022-04-04 RX ADMIN — LEVOFLOXACIN 750 MG: 5 INJECTION, SOLUTION INTRAVENOUS at 11:08

## 2022-04-04 RX ADMIN — Medication 2 SPRAY: at 11:08

## 2022-04-04 RX ADMIN — DICYCLOMINE HYDROCHLORIDE 10 MG: 10 CAPSULE ORAL at 17:52

## 2022-04-04 RX ADMIN — LOSARTAN POTASSIUM 100 MG: 50 TABLET, FILM COATED ORAL at 11:07

## 2022-04-04 RX ADMIN — DICYCLOMINE HYDROCHLORIDE 10 MG: 10 CAPSULE ORAL at 14:11

## 2022-04-04 RX ADMIN — BUDESONIDE AND FORMOTEROL FUMARATE DIHYDRATE 2 PUFF: 80; 4.5 AEROSOL RESPIRATORY (INHALATION) at 09:58

## 2022-04-04 RX ADMIN — FINASTERIDE 5 MG: 5 TABLET, FILM COATED ORAL at 11:07

## 2022-04-04 RX ADMIN — ACETAMINOPHEN 650 MG: 325 TABLET ORAL at 17:52

## 2022-04-04 RX ADMIN — TAMSULOSIN HYDROCHLORIDE 0.4 MG: 0.4 CAPSULE ORAL at 11:07

## 2022-04-04 NOTE — PROGRESS NOTES
Bedside and Verbal shift change report given to Michelle Ahn RN (oncoming nurse) by Lakisha Pearl RN (offgoing nurse). Report included the following information SBAR, Procedure Summary, Intake/Output and MAR.

## 2022-04-04 NOTE — PROGRESS NOTES
4/4/2022   CARE MANAGEMENT NOTE:  CM reviewed EMR. Pt was admitted with perforated diverticulitis; also Covid positive. Reportedly, pt resides with his wife Jose Armando Toure ([phone]).    RUR 13%; LOS 5 days     Transition Plan of Care:  1. General Surgery is following for medical management - pt is s/p ex lap; sigmoid colectomy on 3/30  2. Plan is for pt to return home with his wife  3. PT eval complete; pt ambulated 40 feet on 4/1  4. Home oxygen eval prior to discharge  5. Outpt f/u  6. Wife will transport pt home     CM will continue to follow pt until discharged.   Nika

## 2022-04-04 NOTE — CONSULTS
[facility]  Hospitalist Group                                                                                          Hospitalist Progress Note  Dori Vigil MD  Answering service: [phone] from in house phone        Date of Service:  2022  NAME:  Marshall Rodriguez YOB: 1933  MRN:  [id number]      Admission Summary: \"Leon Luna is a 80 y.o. male with past medical history notable for bronchiectasis, hyperlipidemia, asthma, history of empyema, hypertension, hypercholesterolemia, lower urinary tract symptoms, BPH (on flomax), impaired fasting glucose, recently diagnosed with COVID-19 3/22/2022 presenting with abdominal pain over the past evening.  He states that he was diagnosed with Covid after feeling generally unwell for several days.  This was over 9 days ago. Ari Braga reports he started having abdominal pain yesterday which was not improving, but actually getting worse. Denies any nausea or vomiting, denies any history of constipation. Reports his last colonoscopy was when he was around 84y/o. CT done is consistent with perforated sigmoid, likely from diverticulitis with free air and 2 collections adjacent to sigmoid. His WBC is 12. COVID test is positive\"    Interval history / Subjective:   3/30/2022. Saw patient this morning, awake alert and oriented, appears weak, stating that he is trying to get better. Does not appear to be in any apparent distress. 3/21/2022. No acute events overnight. Saw patient this morning, awake alert oriented in no apparent distress, has not had a bowel movement, passing flatus, denies any fever, chills, nausea, vomiting. Abdominal pain is tolerable. 2022. No acute events overnight. Saw patient this morning, complaining of abdominal distention, not passing flatus or having bowel movement, neuro denies any fever, chills, nausea, vomiting. 2022. No acute events overnight.   Saw patient this morning, awake alert and oriented, still complaining of distended abdomen and feeling nauseous, is not passing flatus but has not had a bowel movement. Denies any fever, chills, chest pain, abdominal pain, urinary symptoms. 4/3/2022. No acute events overnight. Still complaining of abdominal distention however passing flatus, has not had a bowel movement, denies any fever, chills, nausea, vomiting. Endorsing low appetite. 4/4/2022. No acute events overnight. Still complaining of abdominal distention, low appetite, passing some flatus, has not had a bowel movement since admission, nasal congestion has resolved, denies any fever, chills, nausea, vomiting. Assessment & Plan:     Perforated sigmoid colon  Status post exploratory laparoscopy  Will defer management to surgical team.    COPD  Does not appear to be acutely exacerbated. Continue LAMA, LABA. As needed DuoNebs, supplemental oxygen, flutter valve and pulmonary toileting. Dyslipidemia   Continue statins. BPH   Asymptomatic, continue finasteride. Monitor for urinary retention. Hypertension   Euvolemic, SBP on the soft side. Continue losartan, hold if MAP less than 65. COVID-19 positive  Self recent COVID-19 infection. Patient is still tested positive for COVID-19. Symptomatic. Continue supportive measures. Abdominal distention  Likely developing ileus postsurgical.  KUB 4/2/2022 shows improved ileus. Code status: Full code  DVT prophylaxis: SCD    Care Plan discussed with: Patient/Family  Anticipated Disposition: Home w/Family  Anticipated Discharge: Less than 24 hours     Hospital Problems  Date Reviewed: 9/25/2019          Codes Class Noted POA    S/P exploratory laparotomy ICD-10-CM: Z98.890  ICD-9-CM: V45.89  3/30/2022 Unknown                Review of Systems:   A comprehensive review of systems was negative except for that written in the HPI. Vital Signs:    Last 24hrs VS reviewed since prior progress note.  Most recent are:  Visit Vitals  BP (!) 173/77   Pulse 73   Temp 97.9 °F (36.6 °C)   Resp 20   Ht 5' 5\" (1.651 m)   Wt 69 kg (152 lb 1.9 oz)   SpO2 94%   BMI 25.31 kg/m²         Intake/Output Summary (Last 24 hours) at 4/4/2022 1351  Last data filed at 4/4/2022 0603  Gross per 24 hour   Intake 400 ml   Output 1320 ml   Net -920 ml        Physical Examination:     I had a face to face encounter with this patient and independently examined them on 4/4/2022 as outlined below:          Constitutional:  No acute distress, cooperative, pleasant    ENT:  Oral mucosa moist, oropharynx benign. Resp:  CTA bilaterally. No wheezing/rhonchi/rales. No accessory muscle use   CV:  Regular rhythm, normal rate, no murmurs, gallops, rubs    GI:  Soft, distended, hypoactive bowel sounds ,no hepatosplenomegaly     Musculoskeletal:  No edema, warm, 2+ pulses throughout    Neurologic:  Moves all extremities. AAOx3, CN II-XII reviewed            Data Review:    Review and/or order of clinical lab test      Labs:     Recent Labs     04/03/22  0408 04/02/22 0405   WBC 11.0 14.2*   HGB 10.0* 10.5*   HCT 30.7* 31.7*    333     Recent Labs     04/03/22  0408 04/02/22 0405   * 131*   K 3.8 3.9    99   CO2 29 26   BUN 14 18   CREA 0.60* 0.65*   GLU 91 79   CA 8.5 8.8   MG  --  2.0     Recent Labs     04/03/22  0408 04/02/22  0405   ALT 12 13   AP 51 49   TBILI 0.4 0.6   TP 5.1* 5.4*   ALB 1.5* 1.6*   GLOB 3.6 3.8     No results for input(s): INR, PTP, APTT, INREXT, INREXT in the last 72 hours. No results for input(s): FE, TIBC, PSAT, FERR in the last 72 hours. No results found for: FOL, RBCF   No results for input(s): PH, PCO2, PO2 in the last 72 hours. No results for input(s): CPK, CKNDX, TROIQ in the last 72 hours.     No lab exists for component: CPKMB  Lab Results   Component Value Date/Time    Cholesterol, total 108 05/31/2013 10:42 AM    HDL Cholesterol 57 05/31/2013 10:42 AM    LDL, calculated 41 05/31/2013 10:42 AM    Triglyceride 48 05/31/2013 10:42 AM    CHOL/HDL Ratio 2.4 02/02/2010 09:20 AM     Lab Results   Component Value Date/Time    Glucose (POC) 135 (H) 04/04/2022 11:14 AM    Glucose (POC) 179 (H) 03/30/2022 10:14 AM    Glucose (POC) 117 (H) 01/27/2014 10:12 AM    Glucose (POC) 154 (H) 01/27/2014 06:47 AM    Glucose (POC) 177 (H) 04/03/2013 04:25 PM     Lab Results   Component Value Date/Time    Color YELLOW/STRAW 03/30/2022 01:28 AM    Appearance CLEAR 03/30/2022 01:28 AM    Specific gravity 1.025 03/30/2022 01:28 AM    Specific gravity 1.010 09/07/2017 09:46 AM    pH (UA) 6.0 03/30/2022 01:28 AM    Protein 30 (A) 03/30/2022 01:28 AM    Glucose Negative 03/30/2022 01:28 AM    Ketone Negative 03/30/2022 01:28 AM    Bilirubin Negative 03/30/2022 01:28 AM    Urobilinogen 0.2 03/30/2022 01:28 AM    Nitrites Negative 03/30/2022 01:28 AM    Leukocyte Esterase Negative 03/30/2022 01:28 AM    Epithelial cells FEW 03/30/2022 01:28 AM    Bacteria 2+ (A) 03/30/2022 01:28 AM    WBC 0-4 03/30/2022 01:28 AM    RBC 20-50 03/30/2022 01:28 AM         Medications Reviewed:     Current Facility-Administered Medications   Medication Dose Route Frequency    amLODIPine (NORVASC) tablet 5 mg  5 mg Oral DAILY    hydrALAZINE (APRESOLINE) 20 mg/mL injection 10 mg  10 mg IntraVENous Q6H PRN    polyethylene glycol (MIRALAX) packet 17 g  17 g Oral DAILY    oxymetazoline (AFRIN) 0.05 % nasal spray 2 Spray  2 Spray Both Nostrils BID    ondansetron (ZOFRAN) injection 4 mg  4 mg IntraVENous Q4H PRN    melatonin tablet 3 mg  3 mg Oral QHS PRN    docusate sodium (COLACE) capsule 100 mg  100 mg Oral BID    dicyclomine (BENTYL) capsule 10 mg  10 mg Oral QID    enoxaparin (LOVENOX) injection 40 mg  40 mg SubCUTAneous Q24H    sodium chloride (NS) flush 5-40 mL  5-40 mL IntraVENous Q8H    oxyCODONE IR (ROXICODONE) tablet 5 mg  5 mg Oral Q4H PRN    acetaminophen (TYLENOL) tablet 650 mg  650 mg Oral Q6H    finasteride (PROSCAR) tablet 5 mg  5 mg Oral DAILY    losartan (COZAAR) tablet 100 mg  100 mg Oral DAILY    tamsulosin (FLOMAX) capsule 0.4 mg  0.4 mg Oral DAILY    budesonide-formoterol (SYMBICORT) 80-4.5 mcg inhaler  2 Puff Inhalation BID RT    albuterol (PROVENTIL HFA, VENTOLIN HFA, PROAIR HFA) inhaler 2 Puff  2 Puff Inhalation Q4H PRN    morphine injection 2 mg  2 mg IntraVENous Q4H PRN     ______________________________________________________________________  EXPECTED LENGTH OF STAY: 3d 7h  ACTUAL LENGTH OF STAY:          5                 Vannesa Pham MD

## 2022-04-04 NOTE — PROGRESS NOTES
Bedside and Verbal shift change report given to RADHAMES Santiago (oncoming nurse) by Zen Herman RN (offgoing nurse). Report included the following information SBAR, Kardex, OR Summary, Procedure Summary, Intake/Output, MAR, Accordion and Recent Results.

## 2022-04-04 NOTE — PROGRESS NOTES
Problem: Mobility Impaired (Adult and Pediatric)  Goal: *Acute Goals and Plan of Care (Insert Text)  Description: FUNCTIONAL STATUS PRIOR TO ADMISSION: Patient was independent and active without use of DME.    HOME SUPPORT PRIOR TO ADMISSION: The patient lived alone with wife to provide assistance. Physical Therapy Goals  Initiated 4/1/2022  1. Patient will move from supine to sit and sit to supine  in bed with independence within 7 day(s). 2.  Patient will transfer from bed to chair and chair to bed with independence using the least restrictive device within 7 day(s). 3.  Patient will perform sit to stand with independence within 7 day(s). 4.  Patient will ambulate with modified independence for 100 feet with the least restrictive device within 7 day(s). 5.  Patient will ascend/descend 1 step with minimal assistance/contact guard assist within 7 day(s). Outcome: Not Met   PHYSICAL THERAPY TREATMENT  Patient: Marshall Rodriguez (15 y.o. male)  Date: 4/4/2022  Diagnosis: S/P exploratory laparotomy [Z98.890] <principal problem not specified>  Procedure(s) (LRB):  LAPAROTOMY EXPLORATORY; SIGMOID COLECTOMY (N/A) 5 Days Post-Op  Precautions: Fall; Droplet +  Chart, physical therapy assessment, plan of care and goals were reviewed. ASSESSMENT  Patient continues with skilled PT services and is progressing towards goals. He verbalizes eagerness to mobilize out of bed and offers excellent participation. Pt mobilizes to edge of bed where he dangles x 4 mins, transfers, stands for toileting needs, and ambulates in room using RW. Pt remains out of bed in chair and tolerates activity with reports of fatigue only. SpO2 stable using 2LNCO2 as found. Current Level of Function Impacting Discharge (mobility/balance): min A    Other factors to consider for discharge: none additional         PLAN :  Patient continues to benefit from skilled intervention to address the above impairments.   Continue treatment per established plan of care. to address goals. Recommendation for discharge: (in order for the patient to meet his/her long term goals)  Physical therapy at least 2 days/week in the home AND ensure assist and/or supervision for safety with all functional mobility pending progress with PT while admitted. This discharge recommendation:  Has not yet been discussed the attending provider and/or case management    IF patient discharges home will need the following DME: to be determined (TBD)       SUBJECTIVE:   Patient stated It feels good to sit in the chair.     Pt received supine, agreeable to PT and cleared by RN. OBJECTIVE DATA SUMMARY:   Critical Behavior:  Neurologic State: Alert  Orientation Level: Oriented X4  Cognition: Appropriate decision making,Appropriate for age attention/concentration,Appropriate safety awareness     Functional Mobility Training:  Bed Mobility:     Supine to Sit: Additional time;Contact guard assistance     Scooting: Additional time;Contact guard assistance        Transfers:  Sit to Stand: Minimum assistance  Stand to Sit: Minimum assistance                             Balance:  Sitting: Without support  Sitting - Static: Good (unsupported)  Sitting - Dynamic: Good (unsupported)  Standing: With support  Standing - Static: Fair (improves to good)  Standing - Dynamic : Fair  Ambulation/Gait Training:  Distance (ft): 25 Feet (ft)  Assistive Device: Walker, rolling;Gait belt  Ambulation - Level of Assistance: Minimal assistance        Gait Abnormalities: Decreased step clearance        Base of Support: Widened     Speed/Ashlee: Pace decreased (<100 feet/min)      Distance limited by generalized fatigue. CINDY drain secured for mobility. Therapeutic Exercises:    Ankle pumps x 10  Pain Rating:  Denies pain    Activity Tolerance:   requires rest breaks    After treatment patient left in no apparent distress:   Sitting in chair, Call bell within reach, and Bed / chair alarm activated  Encouraged pt to call staff for assistance to mobilize out of bed to chair at least 3x/day      COMMUNICATION/COLLABORATION:   The patients plan of care was discussed with: Registered nurse.      Naif Craig, PT, DPT   Time Calculation: 35 mins

## 2022-04-04 NOTE — PROGRESS NOTES
SURGERY PROGRESS NOTE      Admit Date: 3/30/2022    POD 5 Days Post-Op    Procedure: Procedure(s):  LAPAROTOMY EXPLORATORY; SIGMOID COLECTOMY      Subjective:   No N/V, feeling better  Passing flatus but no BMs  Feels better, just needs help to get out of bed. Pt states he has not been helped out of bed since 22    Objective:     Visit Vitals  BP (!) 173/77   Pulse 73   Temp 97.9 °F (36.6 °C)   Resp 20   Ht 5' 5\" (1.651 m)   Wt 69 kg (152 lb 1.9 oz)   SpO2 94%   BMI 25.31 kg/m²        Temp (24hrs), Av.4 °F (36.9 °C), Min:97.9 °F (36.6 °C), Max:98.7 °F (37.1 °C)      Physical Exam:     Abdomen:  Soft. Non-tender, distended upper abdomen-improving.   Incision C/D/I. CINDY serous        Lab Results   Component Value Date/Time    WBC 11.0 2022 04:08 AM    HGB 10.0 (L) 2022 04:08 AM    HCT 30.7 (L) 2022 04:08 AM    PLATELET 799  04:08 AM    MCV 87.7 2022 04:08 AM     Lab Results   Component Value Date/Time    GFR est non-AA >60 2022 04:08 AM    GFR est AA >60 2022 04:08 AM    Creatinine 0.60 (L) 2022 04:08 AM    BUN 14 2022 04:08 AM    Sodium 133 (L) 2022 04:08 AM    Potassium 3.8 2022 04:08 AM    Chloride 100 2022 04:08 AM    CO2 29 2022 04:08 AM    Magnesium 2.0 2022 04:05 AM    Phosphorus 2.6 2013 05:45 AM       Assessment:     Active Problems:    S/P exploratory laparotomy (3/30/2022)        Plan/Recommendations/Medical Decision Making:       DC IV antibx- leukocytosis resolved  Stay at fulls today  Add miralax and continue colace  Dc IVF  Must ambulate- needs help from PT and/or mobility- has not been helped out of bed since     Pt seen and discussed with Dr Robinson Monzon, NP

## 2022-04-05 LAB
ALBUMIN SERPL-MCNC: 1.6 G/DL (ref 3.5–5)
ALBUMIN/GLOB SERPL: 0.4 {RATIO} (ref 1.1–2.2)
ALP SERPL-CCNC: 57 U/L (ref 45–117)
ALT SERPL-CCNC: 10 U/L (ref 12–78)
ANION GAP SERPL CALC-SCNC: 3 MMOL/L (ref 5–15)
AST SERPL-CCNC: 13 U/L (ref 15–37)
BACTERIA SPEC CULT: NORMAL
BILIRUB SERPL-MCNC: 0.4 MG/DL (ref 0.2–1)
BUN SERPL-MCNC: 9 MG/DL (ref 6–20)
BUN/CREAT SERPL: 17 (ref 12–20)
CALCIUM SERPL-MCNC: 8.3 MG/DL (ref 8.5–10.1)
CHLORIDE SERPL-SCNC: 100 MMOL/L (ref 97–108)
CO2 SERPL-SCNC: 32 MMOL/L (ref 21–32)
CREAT SERPL-MCNC: 0.54 MG/DL (ref 0.7–1.3)
ERYTHROCYTE [DISTWIDTH] IN BLOOD BY AUTOMATED COUNT: 12.9 % (ref 11.5–14.5)
GLOBULIN SER CALC-MCNC: 3.6 G/DL (ref 2–4)
GLUCOSE SERPL-MCNC: 133 MG/DL (ref 65–100)
HCT VFR BLD AUTO: 30.4 % (ref 36.6–50.3)
HGB BLD-MCNC: 9.9 G/DL (ref 12.1–17)
MAGNESIUM SERPL-MCNC: 2.1 MG/DL (ref 1.6–2.4)
MCH RBC QN AUTO: 28.5 PG (ref 26–34)
MCHC RBC AUTO-ENTMCNC: 32.6 G/DL (ref 30–36.5)
MCV RBC AUTO: 87.6 FL (ref 80–99)
NRBC # BLD: 0 K/UL (ref 0–0.01)
NRBC BLD-RTO: 0 PER 100 WBC
PLATELET # BLD AUTO: 310 K/UL (ref 150–400)
PMV BLD AUTO: 8.6 FL (ref 8.9–12.9)
POTASSIUM SERPL-SCNC: 3.7 MMOL/L (ref 3.5–5.1)
PROT SERPL-MCNC: 5.2 G/DL (ref 6.4–8.2)
RBC # BLD AUTO: 3.47 M/UL (ref 4.1–5.7)
SERVICE CMNT-IMP: NORMAL
SODIUM SERPL-SCNC: 135 MMOL/L (ref 136–145)
WBC # BLD AUTO: 9.2 K/UL (ref 4.1–11.1)

## 2022-04-05 PROCEDURE — 97530 THERAPEUTIC ACTIVITIES: CPT

## 2022-04-05 PROCEDURE — 94761 N-INVAS EAR/PLS OXIMETRY MLT: CPT

## 2022-04-05 PROCEDURE — 80053 COMPREHEN METABOLIC PANEL: CPT

## 2022-04-05 PROCEDURE — 74011250637 HC RX REV CODE- 250/637: Performed by: SURGERY

## 2022-04-05 PROCEDURE — 74011250637 HC RX REV CODE- 250/637: Performed by: NURSE PRACTITIONER

## 2022-04-05 PROCEDURE — 77010033678 HC OXYGEN DAILY

## 2022-04-05 PROCEDURE — 74011250636 HC RX REV CODE- 250/636: Performed by: NURSE PRACTITIONER

## 2022-04-05 PROCEDURE — 85027 COMPLETE CBC AUTOMATED: CPT

## 2022-04-05 PROCEDURE — 74011250637 HC RX REV CODE- 250/637: Performed by: INTERNAL MEDICINE

## 2022-04-05 PROCEDURE — 97116 GAIT TRAINING THERAPY: CPT

## 2022-04-05 PROCEDURE — 94640 AIRWAY INHALATION TREATMENT: CPT

## 2022-04-05 PROCEDURE — 83735 ASSAY OF MAGNESIUM: CPT

## 2022-04-05 PROCEDURE — 36415 COLL VENOUS BLD VENIPUNCTURE: CPT

## 2022-04-05 PROCEDURE — 94664 DEMO&/EVAL PT USE INHALER: CPT

## 2022-04-05 PROCEDURE — 74011000250 HC RX REV CODE- 250: Performed by: ANESTHESIOLOGY

## 2022-04-05 PROCEDURE — 65270000029 HC RM PRIVATE

## 2022-04-05 RX ORDER — METOPROLOL SUCCINATE 25 MG/1
25 TABLET, EXTENDED RELEASE ORAL DAILY
Status: DISCONTINUED | OUTPATIENT
Start: 2022-04-05 | End: 2022-04-06 | Stop reason: HOSPADM

## 2022-04-05 RX ADMIN — Medication 10 ML: at 22:20

## 2022-04-05 RX ADMIN — BUDESONIDE AND FORMOTEROL FUMARATE DIHYDRATE 2 PUFF: 80; 4.5 AEROSOL RESPIRATORY (INHALATION) at 21:07

## 2022-04-05 RX ADMIN — Medication 10 ML: at 05:19

## 2022-04-05 RX ADMIN — Medication 3 MG: at 22:19

## 2022-04-05 RX ADMIN — Medication 10 ML: at 17:25

## 2022-04-05 RX ADMIN — ACETAMINOPHEN 650 MG: 325 TABLET ORAL at 22:20

## 2022-04-05 RX ADMIN — POLYETHYLENE GLYCOL 3350 17 G: 17 POWDER, FOR SOLUTION ORAL at 09:07

## 2022-04-05 RX ADMIN — Medication 2 SPRAY: at 17:25

## 2022-04-05 RX ADMIN — DICYCLOMINE HYDROCHLORIDE 10 MG: 10 CAPSULE ORAL at 09:07

## 2022-04-05 RX ADMIN — ACETAMINOPHEN 650 MG: 325 TABLET ORAL at 13:11

## 2022-04-05 RX ADMIN — LOSARTAN POTASSIUM 100 MG: 50 TABLET, FILM COATED ORAL at 09:07

## 2022-04-05 RX ADMIN — DICYCLOMINE HYDROCHLORIDE 10 MG: 10 CAPSULE ORAL at 22:19

## 2022-04-05 RX ADMIN — ACETAMINOPHEN 650 MG: 325 TABLET ORAL at 00:06

## 2022-04-05 RX ADMIN — BUDESONIDE AND FORMOTEROL FUMARATE DIHYDRATE 2 PUFF: 80; 4.5 AEROSOL RESPIRATORY (INHALATION) at 08:14

## 2022-04-05 RX ADMIN — AMLODIPINE BESYLATE 5 MG: 5 TABLET ORAL at 09:08

## 2022-04-05 RX ADMIN — DOCUSATE SODIUM 100 MG: 100 CAPSULE ORAL at 17:24

## 2022-04-05 RX ADMIN — DICYCLOMINE HYDROCHLORIDE 10 MG: 10 CAPSULE ORAL at 17:24

## 2022-04-05 RX ADMIN — Medication 2 SPRAY: at 09:08

## 2022-04-05 RX ADMIN — ACETAMINOPHEN 650 MG: 325 TABLET ORAL at 17:24

## 2022-04-05 RX ADMIN — DICYCLOMINE HYDROCHLORIDE 10 MG: 10 CAPSULE ORAL at 13:11

## 2022-04-05 RX ADMIN — METOPROLOL SUCCINATE 25 MG: 25 TABLET, EXTENDED RELEASE ORAL at 17:24

## 2022-04-05 RX ADMIN — TAMSULOSIN HYDROCHLORIDE 0.4 MG: 0.4 CAPSULE ORAL at 09:07

## 2022-04-05 RX ADMIN — DOCUSATE SODIUM 100 MG: 100 CAPSULE ORAL at 09:07

## 2022-04-05 RX ADMIN — ENOXAPARIN SODIUM 40 MG: 40 INJECTION SUBCUTANEOUS at 13:11

## 2022-04-05 RX ADMIN — FINASTERIDE 5 MG: 5 TABLET, FILM COATED ORAL at 09:08

## 2022-04-05 RX ADMIN — ACETAMINOPHEN 650 MG: 325 TABLET ORAL at 05:18

## 2022-04-05 NOTE — PROGRESS NOTES
4/5/2022   CARE MANAGEMENT NOTE:  CM reviewed EMR.  Pt was admitted with perforated diverticulitis; also Covid positive. Reportedly, pt resides with his wife Yamila Williamson ([phone]).    RUR 11%; LOS 6 days     Transition Plan of Care:  1.  General Surgery is following for medical management - pt is s/p ex lap; sigmoid colectomy on 3/30  2. Elier Dom is for pt to return home with his wife  3. PT eval complete; pt ambulated 25 feet on 4/4 and home health was recommended  4.  Home oxygen eval prior to discharge  5.  Outpt f/u  6.  Wife will transport pt home     CM will continue to follow pt until discharged.   Nika

## 2022-04-05 NOTE — PROGRESS NOTES
Problem: Mobility Impaired (Adult and Pediatric)  Goal: *Acute Goals and Plan of Care (Insert Text)  Description: FUNCTIONAL STATUS PRIOR TO ADMISSION: Patient was independent and active without use of DME.    HOME SUPPORT PRIOR TO ADMISSION: The patient lived alone with wife to provide assistance. Physical Therapy Goals  Initiated 4/1/2022  1. Patient will move from supine to sit and sit to supine  in bed with independence within 7 day(s). 2.  Patient will transfer from bed to chair and chair to bed with independence using the least restrictive device within 7 day(s). 3.  Patient will perform sit to stand with independence within 7 day(s). 4.  Patient will ambulate with modified independence for 100 feet with the least restrictive device within 7 day(s). 5.  Patient will ascend/descend 1 step with minimal assistance/contact guard assist within 7 day(s). Outcome: Progressing Towards Goal   PHYSICAL THERAPY TREATMENT  Patient: Glo Cole (84 y.o. male)  Date: 4/5/2022  Diagnosis: S/P exploratory laparotomy [Z98.890] <principal problem not specified>  Procedure(s) (LRB):  LAPAROTOMY EXPLORATORY; SIGMOID COLECTOMY (N/A) 6 Days Post-Op  Precautions: Fall  Chart, physical therapy assessment, plan of care and goals were reviewed. ASSESSMENT  Patient continues with skilled PT services and is progressing towards goals. Patient this afternoon with good tolerance and participation in physical therapy session. Patient ambulates increased distance, 25ft x 2 trials with CGAx1 and RW. For sit > stand he requires between Yuri (first attempt) and CGA (second attempt). Intermittent cuing for safety with RW. He is successful for small BM and completes perianal hygiene in standing with CGA. Small amount of bright red blood on tissue paper after wiping. Once back to chair, patient reporting nausea, however vitals stable as below. Alerted RN.  Patient reports mild improvement in nauseous symptoms at conclusion of session. Recommend HHPT with support/supervision for upright mobility upon d/c. He will require RW. Spo2 stable on room air in high 90s throughout session with activity. Current Level of Function Impacting Discharge (mobility/balance): CGA, Yuri for initial sit > stand    Other factors to consider for discharge: none additional         PLAN :  Patient continues to benefit from skilled intervention to address the above impairments. Continue treatment per established plan of care. to address goals. Recommendation for discharge: (in order for the patient to meet his/her long term goals)  Physical therapy at least 2 days/week in the home AND ensure assist and/or supervision for safety with upright mobility    This discharge recommendation:  Has not yet been discussed the attending provider and/or case management    IF patient discharges home will need the following DME: rolling walker       SUBJECTIVE:   Patient stated that's good.  re: telling patient he did not require supplemental oxygen during treatment. OBJECTIVE DATA SUMMARY:   Patient received supine in bed and was agreeable to participate in PT session. Patient was cleared by nursing to participate in PT session. Vitals:    04/05/22 1118 04/05/22 1230   BP: (!) 150/70 (!) 152/68   BP 1 Location: Right upper arm Right upper arm   BP Patient Position: At rest Sitting  Comment: post ambulation   Pulse: 72 (!) 103   Temp: 98.7 °F (37.1 °C)    Resp: 22    Height:     Weight:     SpO2: 92% 94%         Critical Behavior:  Neurologic State: Alert  Orientation Level: Oriented X4  Cognition: Follows commands     Functional Mobility Training:  Bed Mobility:     Supine to Sit: Contact guard assistance     Scooting: Contact guard assistance; Additional time        Transfers:  Sit to Stand: Minimum assistance  Stand to Sit: Minimum assistance        Bed to Chair: Minimum assistance;Contact guard assistance;Assist x1                    Balance:  Sitting: Intact; Without support  Sitting - Static: Good (unsupported)  Sitting - Dynamic: Good (unsupported)  Standing: Impaired; With support  Standing - Static: Fair  Standing - Dynamic : Fair  Ambulation/Gait Training:  Distance (ft): 25 Feet (ft) (x2)  Assistive Device: Gait belt;Walker, rolling  Ambulation - Level of Assistance: Contact guard assistance;Assist x1        Gait Abnormalities: Decreased step clearance              Speed/Ashlee: Pace decreased (<100 feet/min)                  Therapeutic Exercises: Ankle pumps 3x25 to address nausea; stopped once vitals revealed stability  Pain Rating:  None    Activity Tolerance:   Fair, SpO2 stable on RA, and requires rest breaks    After treatment patient left in no apparent distress:   Sitting in chair, Heels elevated for pressure relief, Call bell within reach, and Bed / chair alarm activated    COMMUNICATION/COLLABORATION:   The patients plan of care was discussed with: Occupational therapist and Registered nurse.      Abigail London PT, DPT   Time Calculation: 27 mins

## 2022-04-05 NOTE — PROGRESS NOTES
SURGERY PROGRESS NOTE      Admit Date: 3/30/2022    POD 6 Days Post-Op    Procedure: Procedure(s):  LAPAROTOMY EXPLORATORY; SIGMOID COLECTOMY      Subjective:   No N/V  Passing flatus but still no BMs  Feels better, just needs help to get out of bed. Pt did well working with PT yesterday      Objective:     Visit Vitals  BP (!) 161/70 (BP 1 Location: Right upper arm, BP Patient Position: At rest)   Pulse 73   Temp 98 °F (36.7 °C)   Resp 20   Ht 5' 5\" (1.651 m)   Wt 69 kg (152 lb 1.9 oz)   SpO2 95%   BMI 25.31 kg/m²        Temp (24hrs), Av.1 °F (36.7 °C), Min:97.7 °F (36.5 °C), Max:98.6 °F (37 °C)      Physical Exam:     Abdomen:  Soft. Non-tender, distended upper abdomen-improving.   Incision C/D/I. CINDY serous        Lab Results   Component Value Date/Time    WBC 9.2 2022 02:24 AM    HGB 9.9 (L) 2022 02:24 AM    HCT 30.4 (L) 2022 02:24 AM    PLATELET 218 1263 02:24 AM    MCV 87.6 2022 02:24 AM     Lab Results   Component Value Date/Time    GFR est non-AA >60 2022 02:24 AM    GFR est AA >60 2022 02:24 AM    Creatinine 0.54 (L) 2022 02:24 AM    BUN 9 2022 02:24 AM    Sodium 135 (L) 2022 02:24 AM    Potassium 3.7 2022 02:24 AM    Chloride 100 2022 02:24 AM    CO2 32 2022 02:24 AM    Magnesium 2.1 2022 02:24 AM    Phosphorus 2.6 2013 05:45 AM       Assessment:     Active Problems:    S/P exploratory laparotomy (3/30/2022)        Plan/Recommendations/Medical Decision Making:     Inc diet to soft low fiber today  Cont bowel regimen  DC drain  Wean off O2 as tolerates- keep O2 above 93%  Cont mobility    Pt seen and discussed with Dr Skinny Mackey, NP

## 2022-04-05 NOTE — CONSULTS
[facility] Adult  Hospitalist Group                                                                                          Hospitalist Progress Note  Brad Reina MD  Answering service: [phone] from in house phone        Date of Service:  2022  NAME:  Fuentes Richardson  :  3/26/1933  MRN:  [id number]      Admission Summary: \"Leon Haq is a 80 y.o. male with past medical history notable for bronchiectasis, hyperlipidemia, asthma, history of empyema, hypertension, hypercholesterolemia, lower urinary tract symptoms, BPH (on flomax), impaired fasting glucose, recently diagnosed with COVID-19 3/22/2022 presenting with abdominal pain over the past evening.  He states that he was diagnosed with Covid after feeling generally unwell for several days.  This was over 9 days ago. [facility] reports he started having abdominal pain yesterday which was not improving, but actually getting worse. Denies any nausea or vomiting, denies any history of constipation. Reports his last colonoscopy was when he was around 86y/o. CT done is consistent with perforated sigmoid, likely from diverticulitis with free air and 2 collections adjacent to sigmoid. His WBC is 12. COVID test is positive\"    Interval history / Subjective:   3/30/2022. Saw patient this morning, awake alert and oriented, appears weak, stating that he is trying to get better. Does not appear to be in any apparent distress. 3/21/2022. No acute events overnight. Saw patient this morning, awake alert oriented in no apparent distress, has not had a bowel movement, passing flatus, denies any fever, chills, nausea, vomiting. Abdominal pain is tolerable. 2022. No acute events overnight. Saw patient this morning, complaining of abdominal distention, not passing flatus or having bowel movement, neuro denies any fever, chills, nausea, vomiting. 2022. No acute events overnight.   Saw patient this morning, awake alert and oriented, still complaining of distended abdomen and feeling nauseous, is not passing flatus but has not had a bowel movement. Denies any fever, chills, chest pain, abdominal pain, urinary symptoms. 4/3/2022. No acute events overnight. Still complaining of abdominal distention however passing flatus, has not had a bowel movement, denies any fever, chills, nausea, vomiting. Endorsing low appetite. 4/4/2022. No acute events overnight. Still complaining of abdominal distention, low appetite, passing some flatus, has not had a bowel movement since admission, nasal congestion has resolved, denies any fever, chills, nausea, vomiting. 4/5/2022. No acute events overnight. Saw patient this morning, awake alert and oriented, in no apparent distress, still complaining of distended abdomen, reporting that he is passing flatus but has not had a bowel movement, reporting that surgical team or advancing his diet today. Denies any fever, chills, nausea, vomiting. Assessment & Plan:     Perforated sigmoid colon  Status post exploratory laparoscopy  Will defer management to surgical team.    COPD  Does not appear to be acutely exacerbated. Continue LAMA, LABA. As needed DuoNebs, supplemental oxygen, flutter valve and pulmonary toileting. Dyslipidemia   Continue statins. BPH   Asymptomatic, continue finasteride. Monitor for urinary retention. Hypertension   Euvolemic, SBP on the soft side. Continue losartan, and amlodipine hold if MAP less than 65. COVID-19 positive  Reports recent COVID-19 infection. Patient is still tested positive for COVID-19. Asymptomatic, SPO2 WNL on RA. Continue supportive measures. Abdominal distention  Likely developing ileus postsurgical.  KUB 4/2/2022 shows improved ileus.       Code status: Full code  DVT prophylaxis: SCD    Care Plan discussed with: Patient/Family  Anticipated Disposition: Home w/Family  Anticipated Discharge: Less than 24 hours     Hospital Problems  Date Reviewed: 9/25/2019          Codes Class Noted POA    S/P exploratory laparotomy ICD-10-CM: Z98.890  ICD-9-CM: V45.89  3/30/2022 Unknown                Review of Systems:   A comprehensive review of systems was negative except for that written in the HPI. Vital Signs:    Last 24hrs VS reviewed since prior progress note. Most recent are:  Visit Vitals  BP (!) 152/68 (BP 1 Location: Right upper arm, BP Patient Position: Sitting)   Pulse (!) 103   Temp 98.7 °F (37.1 °C)   Resp 22   Ht 5' 5\" (1.651 m)   Wt 69 kg (152 lb 1.9 oz)   SpO2 94%   BMI 25.31 kg/m²         Intake/Output Summary (Last 24 hours) at 4/5/2022 1412  Last data filed at 4/5/2022 1337  Gross per 24 hour   Intake 880 ml   Output 1215 ml   Net -335 ml        Physical Examination:     I had a face to face encounter with this patient and independently examined them on 4/5/2022 as outlined below:          Constitutional:  No acute distress, cooperative, pleasant    ENT:  Oral mucosa moist, oropharynx benign. Resp:  CTA bilaterally. No wheezing/rhonchi/rales. No accessory muscle use   CV:  Regular rhythm, normal rate, no murmurs, gallops, rubs    GI:  Soft, distended, hypoactive bowel sounds ,no hepatosplenomegaly     Musculoskeletal:  No edema, warm, 2+ pulses throughout    Neurologic:  Moves all extremities. AAOx3, CN II-XII reviewed            Data Review:    Review and/or order of clinical lab test      Labs:     Recent Labs     04/05/22 0224 04/03/22 0408   WBC 9.2 11.0   HGB 9.9* 10.0*   HCT 30.4* 30.7*    311     Recent Labs     04/05/22 0224 04/03/22 0408   * 133*   K 3.7 3.8    100   CO2 32 29   BUN 9 14   CREA 0.54* 0.60*   * 91   CA 8.3* 8.5   MG 2.1  --      Recent Labs     04/05/22 0224 04/03/22  0408   ALT 10* 12   AP 57 51   TBILI 0.4 0.4   TP 5.2* 5.1*   ALB 1.6* 1.5*   GLOB 3.6 3.6     No results for input(s): INR, PTP, APTT, INREXT, INREXT in the last 72 hours.    No results for input(s): FE, TIBC, PSAT, FERR in the last 72 hours. No results found for: FOL, RBCF   No results for input(s): PH, PCO2, PO2 in the last 72 hours. No results for input(s): CPK, CKNDX, TROIQ in the last 72 hours.     No lab exists for component: CPKMB  Lab Results   Component Value Date/Time    Cholesterol, total 108 05/31/2013 10:42 AM    HDL Cholesterol 57 05/31/2013 10:42 AM    LDL, calculated 41 05/31/2013 10:42 AM    Triglyceride 48 05/31/2013 10:42 AM    CHOL/HDL Ratio 2.4 02/02/2010 09:20 AM     Lab Results   Component Value Date/Time    Glucose (POC) 135 (H) 04/04/2022 11:14 AM    Glucose (POC) 179 (H) 03/30/2022 10:14 AM    Glucose (POC) 117 (H) 01/27/2014 10:12 AM    Glucose (POC) 154 (H) 01/27/2014 06:47 AM    Glucose (POC) 177 (H) 04/03/2013 04:25 PM     Lab Results   Component Value Date/Time    Color YELLOW/STRAW 03/30/2022 01:28 AM    Appearance CLEAR 03/30/2022 01:28 AM    Specific gravity 1.025 03/30/2022 01:28 AM    Specific gravity 1.010 09/07/2017 09:46 AM    pH (UA) 6.0 03/30/2022 01:28 AM    Protein 30 (A) 03/30/2022 01:28 AM    Glucose Negative 03/30/2022 01:28 AM    Ketone Negative 03/30/2022 01:28 AM    Bilirubin Negative 03/30/2022 01:28 AM    Urobilinogen 0.2 03/30/2022 01:28 AM    Nitrites Negative 03/30/2022 01:28 AM    Leukocyte Esterase Negative 03/30/2022 01:28 AM    Epithelial cells FEW 03/30/2022 01:28 AM    Bacteria 2+ (A) 03/30/2022 01:28 AM    WBC 0-4 03/30/2022 01:28 AM    RBC 20-50 03/30/2022 01:28 AM         Medications Reviewed:     Current Facility-Administered Medications   Medication Dose Route Frequency    amLODIPine (NORVASC) tablet 5 mg  5 mg Oral DAILY    hydrALAZINE (APRESOLINE) 20 mg/mL injection 10 mg  10 mg IntraVENous Q6H PRN    polyethylene glycol (MIRALAX) packet 17 g  17 g Oral DAILY    oxymetazoline (AFRIN) 0.05 % nasal spray 2 Spray  2 Spray Both Nostrils BID    ondansetron (ZOFRAN) injection 4 mg  4 mg IntraVENous Q4H PRN    melatonin tablet 3 mg  3 mg Oral QHS PRN    docusate sodium (COLACE) capsule 100 mg  100 mg Oral BID    dicyclomine (BENTYL) capsule 10 mg  10 mg Oral QID    enoxaparin (LOVENOX) injection 40 mg  40 mg SubCUTAneous Q24H    sodium chloride (NS) flush 5-40 mL  5-40 mL IntraVENous Q8H    oxyCODONE IR (ROXICODONE) tablet 5 mg  5 mg Oral Q4H PRN    acetaminophen (TYLENOL) tablet 650 mg  650 mg Oral Q6H    finasteride (PROSCAR) tablet 5 mg  5 mg Oral DAILY    losartan (COZAAR) tablet 100 mg  100 mg Oral DAILY    tamsulosin (FLOMAX) capsule 0.4 mg  0.4 mg Oral DAILY    budesonide-formoterol (SYMBICORT) 80-4.5 mcg inhaler  2 Puff Inhalation BID RT    albuterol (PROVENTIL HFA, VENTOLIN HFA, PROAIR HFA) inhaler 2 Puff  2 Puff Inhalation Q4H PRN    morphine injection 2 mg  2 mg IntraVENous Q4H PRN     ______________________________________________________________________  EXPECTED LENGTH OF STAY: 3d 7h  ACTUAL LENGTH OF STAY:          6                 Jalyn Denton MD

## 2022-04-05 NOTE — PROGRESS NOTES
1900; Bedside shift change report given to Galilea (oncoming nurse) by Dave Bernal (offgoing nurse). Report included the following information SBAR, Kardex, Intake/Output, MAR and Recent Results.

## 2022-04-05 NOTE — PROGRESS NOTES
Comprehensive Nutrition Assessment    Type and Reason for Visit: Initial,RD nutrition re-screen/LOS,Positive nutrition screen    Nutrition Recommendations/Plan:   1. Advance diet to regular, GI [place] as able  2. Provide Ensure High Protein once daily (160 kcal, 19 g carbs, 16 g protein)      Nutrition Assessment:    Pt is an 80year old male admitted with S/P exploratory laparotomy [Z98.890]. He  has a past medical history of Arthritis, Asthma, Empyema of lung ([address] (2/2013), Hypercholesteremia, Hypertension, Hypertrophy of prostate without urinary obstruction and other lower urinary tract symptoms (LUTS), Skin cancer, Sun-damaged skin, and Type II or unspecified type diabetes mellitus. BPA for pressure ulcer. No wound care consult noted. Patient on droplet+ precautions and did not answer phone. Per documentation, weight has been fairly stable x 5 years. Lack of recent weight hx. PO intake averaging 75% or more of all meals - diet was advanced from full liquids to Soft & Bite sized per surgery this AM. No noted N/V/D. POD 6 ex-lap, no noted BM yet.      Last 3 Recorded Weights in this Encounter    03/30/22 0042   Weight: 69 kg (152 lb 1.9 oz)       Wt Readings from Last 10 Encounters:   03/30/22 69 kg (152 lb 1.9 oz)   09/25/19 71.7 kg (158 lb)   04/24/19 71.7 kg (158 lb)   04/10/19 71.7 kg (158 lb)   03/20/19 71.7 kg (158 lb)   07/12/18 71.7 kg (158 lb)   01/18/18 69.4 kg (153 lb)   10/24/17 69.4 kg (153 lb)   10/24/17 69.4 kg (153 lb)   09/07/17 70.3 kg (155 lb)     Patient Vitals for the past 168 hrs:   % Diet Eaten   03/31/22 1600 76 - 100%   03/31/22 1000 76 - 100%       Malnutrition Assessment:  Malnutrition Status:  Insufficient data  - unable to physically assess patient at this time  Context:  Acute illness     Findings of the 6 clinical characteristics of malnutrition:   Energy Intake:  No significant decrease in energy intake  Weight Loss:  No significant weight loss     Body Fat Loss:  Unable to assess, Muscle Mass Loss:  Unable to assess,    Fluid Accumulation:  No significant fluid accumulation,     Strength:  Not performed     Estimated Daily Nutrient Needs:  Energy (kcal): 1537 (MSJ x 1.2); Weight Used for Energy Requirements: Current  Protein (g): 69-83 (1.0-1.2); Weight Used for Protein Requirements: Current  Fluid (ml/day): 1537; Method Used for Fluid Requirements: 1 ml/kcal    Nutrition Related Findings:       ABD: Good appetite, active bowel sounds  Last BM: 03/31/22, Loose  Edema:LLE: Trace (4/5/2022  9:12 AM)  RLE: Trace (4/5/2022  9:12 AM)      Nutr. Labs:    Lab Results   Component Value Date/Time    GFR est AA >60 04/05/2022 02:24 AM    GFR est non-AA >60 04/05/2022 02:24 AM    Creatinine 0.54 (L) 04/05/2022 02:24 AM    BUN 9 04/05/2022 02:24 AM    Sodium 135 (L) 04/05/2022 02:24 AM    Potassium 3.7 04/05/2022 02:24 AM    Chloride 100 04/05/2022 02:24 AM    CO2 32 04/05/2022 02:24 AM       Lab Results   Component Value Date/Time    Glucose 133 (H) 04/05/2022 02:24 AM    Glucose (POC) 135 (H) 04/04/2022 11:14 AM       Lab Results   Component Value Date/Time    Hemoglobin A1c 6.5 (H) 01/15/2014 10:00 AM     Nutr. Meds:  Norvasc, colace, lovenox, proscar, cozaar, zofran PRN, miralax PRN    Wounds:    Pressure Injury noted, no wound care note - surg incision on abd     Current Nutrition Therapies:  ADULT DIET Dysphagia - Soft & Bite Sized; GI [place] (GERD/Peptic Ulcer); Low Fiber    Anthropometric Measures:  · Height:  5' 5\" (165.1 cm)  · Current Body Wt:  69 kg (152 lb 1.9 oz)   · Admission Body Wt:  152 lb 1.9 oz    · Usual Body Wt:        · Ideal Body Wt:  136 lbs:  111.9 %   · Body mass index is 25.31 kg/m². · BMI Category: Overweight (BMI 25.0-29. 9)       Nutrition Diagnosis:   · Increased nutrient needs related to catabolic illness as evidenced by  (COVID-19)    Nutrition Interventions:   Food and/or Nutrient Delivery: Continue current diet  Nutrition Education and Counseling: No recommendations at this time  Coordination of Nutrition Care: Continue to monitor while inpatient,Interdisciplinary rounds    Goals:  PO intake >/= 75% of all meals within 5 - 7 days       Nutrition Monitoring and Evaluation:   Behavioral-Environmental Outcomes: None identified  Food/Nutrient Intake Outcomes: Food and nutrient intake  Physical Signs/Symptoms Outcomes: Biochemical data,Skin,Weight,Constipation    Discharge Planning:     Too soon to determine     Electronically signed by Erick Khoury RD on 3/3/5063   Contact: [phone] or via [email or web address]

## 2022-04-06 VITALS
SYSTOLIC BLOOD PRESSURE: 113 MMHG | HEIGHT: 65 IN | TEMPERATURE: 97.8 F | WEIGHT: 152.12 LBS | OXYGEN SATURATION: 95 % | HEART RATE: 83 BPM | RESPIRATION RATE: 18 BRPM | BODY MASS INDEX: 25.34 KG/M2 | DIASTOLIC BLOOD PRESSURE: 57 MMHG

## 2022-04-06 LAB
ALBUMIN SERPL-MCNC: 1.4 G/DL (ref 3.5–5)
ALBUMIN/GLOB SERPL: 0.5 {RATIO} (ref 1.1–2.2)
ALP SERPL-CCNC: 58 U/L (ref 45–117)
ALT SERPL-CCNC: 11 U/L (ref 12–78)
ANION GAP SERPL CALC-SCNC: 5 MMOL/L (ref 5–15)
AST SERPL-CCNC: 18 U/L (ref 15–37)
BILIRUB SERPL-MCNC: 0.3 MG/DL (ref 0.2–1)
BUN SERPL-MCNC: 8 MG/DL (ref 6–20)
BUN/CREAT SERPL: 20 (ref 12–20)
CALCIUM SERPL-MCNC: 7.4 MG/DL (ref 8.5–10.1)
CHLORIDE SERPL-SCNC: 108 MMOL/L (ref 97–108)
CO2 SERPL-SCNC: 27 MMOL/L (ref 21–32)
CREAT SERPL-MCNC: 0.41 MG/DL (ref 0.7–1.3)
ERYTHROCYTE [DISTWIDTH] IN BLOOD BY AUTOMATED COUNT: 12.9 % (ref 11.5–14.5)
GLOBULIN SER CALC-MCNC: 3.1 G/DL (ref 2–4)
GLUCOSE SERPL-MCNC: 85 MG/DL (ref 65–100)
HCT VFR BLD AUTO: 31.7 % (ref 36.6–50.3)
HGB BLD-MCNC: 10.2 G/DL (ref 12.1–17)
MAGNESIUM SERPL-MCNC: 1.9 MG/DL (ref 1.6–2.4)
MCH RBC QN AUTO: 29.5 PG (ref 26–34)
MCHC RBC AUTO-ENTMCNC: 32.2 G/DL (ref 30–36.5)
MCV RBC AUTO: 91.6 FL (ref 80–99)
NRBC # BLD: 0 K/UL (ref 0–0.01)
NRBC BLD-RTO: 0 PER 100 WBC
PLATELET # BLD AUTO: 343 K/UL (ref 150–400)
PMV BLD AUTO: 8.4 FL (ref 8.9–12.9)
POTASSIUM SERPL-SCNC: 3.4 MMOL/L (ref 3.5–5.1)
PROT SERPL-MCNC: 4.5 G/DL (ref 6.4–8.2)
RBC # BLD AUTO: 3.46 M/UL (ref 4.1–5.7)
SODIUM SERPL-SCNC: 140 MMOL/L (ref 136–145)
WBC # BLD AUTO: 10.2 K/UL (ref 4.1–11.1)

## 2022-04-06 PROCEDURE — 36415 COLL VENOUS BLD VENIPUNCTURE: CPT

## 2022-04-06 PROCEDURE — 74011250637 HC RX REV CODE- 250/637: Performed by: INTERNAL MEDICINE

## 2022-04-06 PROCEDURE — 80053 COMPREHEN METABOLIC PANEL: CPT

## 2022-04-06 PROCEDURE — 74011250637 HC RX REV CODE- 250/637: Performed by: SURGERY

## 2022-04-06 PROCEDURE — 74011000250 HC RX REV CODE- 250: Performed by: ANESTHESIOLOGY

## 2022-04-06 PROCEDURE — 94761 N-INVAS EAR/PLS OXIMETRY MLT: CPT

## 2022-04-06 PROCEDURE — 74011250636 HC RX REV CODE- 250/636: Performed by: NURSE PRACTITIONER

## 2022-04-06 PROCEDURE — 85027 COMPLETE CBC AUTOMATED: CPT

## 2022-04-06 PROCEDURE — 74011250637 HC RX REV CODE- 250/637: Performed by: NURSE PRACTITIONER

## 2022-04-06 PROCEDURE — 83735 ASSAY OF MAGNESIUM: CPT

## 2022-04-06 PROCEDURE — 94640 AIRWAY INHALATION TREATMENT: CPT

## 2022-04-06 RX ORDER — OXYCODONE HYDROCHLORIDE 5 MG/1
5 TABLET ORAL
Qty: 12 TABLET | Refills: 0 | Status: SHIPPED | OUTPATIENT
Start: 2022-04-06 | End: 2022-04-09

## 2022-04-06 RX ADMIN — DICYCLOMINE HYDROCHLORIDE 10 MG: 10 CAPSULE ORAL at 09:07

## 2022-04-06 RX ADMIN — POTASSIUM BICARBONATE 40 MEQ: 782 TABLET, EFFERVESCENT ORAL at 13:04

## 2022-04-06 RX ADMIN — ACETAMINOPHEN 650 MG: 325 TABLET ORAL at 05:27

## 2022-04-06 RX ADMIN — TAMSULOSIN HYDROCHLORIDE 0.4 MG: 0.4 CAPSULE ORAL at 09:07

## 2022-04-06 RX ADMIN — METOPROLOL SUCCINATE 25 MG: 25 TABLET, EXTENDED RELEASE ORAL at 09:07

## 2022-04-06 RX ADMIN — Medication 10 ML: at 05:27

## 2022-04-06 RX ADMIN — Medication 2 SPRAY: at 13:05

## 2022-04-06 RX ADMIN — DOCUSATE SODIUM 100 MG: 100 CAPSULE ORAL at 09:06

## 2022-04-06 RX ADMIN — DICYCLOMINE HYDROCHLORIDE 10 MG: 10 CAPSULE ORAL at 14:09

## 2022-04-06 RX ADMIN — POLYETHYLENE GLYCOL 3350 17 G: 17 POWDER, FOR SOLUTION ORAL at 09:07

## 2022-04-06 RX ADMIN — BUDESONIDE AND FORMOTEROL FUMARATE DIHYDRATE 2 PUFF: 80; 4.5 AEROSOL RESPIRATORY (INHALATION) at 08:32

## 2022-04-06 RX ADMIN — LOSARTAN POTASSIUM 100 MG: 50 TABLET, FILM COATED ORAL at 09:07

## 2022-04-06 RX ADMIN — AMLODIPINE BESYLATE 5 MG: 5 TABLET ORAL at 09:07

## 2022-04-06 RX ADMIN — ENOXAPARIN SODIUM 40 MG: 40 INJECTION SUBCUTANEOUS at 14:09

## 2022-04-06 RX ADMIN — FINASTERIDE 5 MG: 5 TABLET, FILM COATED ORAL at 09:07

## 2022-04-06 RX ADMIN — Medication 10 ML: at 14:00

## 2022-04-06 RX ADMIN — ACETAMINOPHEN 650 MG: 325 TABLET ORAL at 13:04

## 2022-04-06 NOTE — PROGRESS NOTES
SURGERY PROGRESS NOTE      Admit Date: 3/30/2022    POD 7 Days Post-Op    Procedure: Procedure(s):  LAPAROTOMY EXPLORATORY; SIGMOID COLECTOMY      Subjective:   No N/V  Passing flatus and BMs  Off O2 and working well with PT      Objective:     Visit Vitals  BP (!) 146/67 (BP 1 Location: Right upper arm, BP Patient Position: At rest;Semi fowlers)   Pulse 70   Temp 98 °F (36.7 °C)   Resp 18   Ht 5' 5\" (1.651 m)   Wt 69 kg (152 lb 1.9 oz)   SpO2 95%   BMI 25.31 kg/m²        Temp (24hrs), Av.1 °F (36.7 °C), Min:97.7 °F (36.5 °C), Max:98.5 °F (36.9 °C)      Physical Exam:     Abdomen:  Soft. Non-tender, distended upper abdomen-improving. Incision C/D/I.          Lab Results   Component Value Date/Time    WBC 10.2 2022 05:30 AM    HGB 10.2 (L) 2022 05:30 AM    HCT 31.7 (L) 2022 05:30 AM    PLATELET 405  05:30 AM    MCV 91.6 2022 05:30 AM     Lab Results   Component Value Date/Time    GFR est non-AA >60 2022 05:30 AM    GFR est AA >60 2022 05:30 AM    Creatinine 0.41 (L) 2022 05:30 AM    BUN 8 2022 05:30 AM    Sodium 140 2022 05:30 AM    Potassium 3.4 (L) 2022 05:30 AM    Chloride 108 2022 05:30 AM    CO2 27 2022 05:30 AM    Magnesium 1.9 2022 05:30 AM    Phosphorus 2.6 2013 05:45 AM       Assessment:     Active Problems:    S/P exploratory laparotomy (3/30/2022)        Plan/Recommendations/Medical Decision Making:     Pt doing well today  Tolerating diet and having BMs  Pt verbalizes understanding of discharge and follow up  To be discharged this afternoon      Pt seen and discussed with Dr Jamaal Orta, NP

## 2022-04-06 NOTE — PROGRESS NOTES
Dorian witnessed/unwitnessed fall occurred on [id number] (Date) at 1549(Time). The answers to the following questions summarize the fall: In the patient's own words,:  · What were you attempting to do when you fell? \"Use the bathroom\"  · Do you know why you fell? \"My legs gave out\"  · Do you have any pain/discomfort or any other complaints? Denies  · Which part of your body made contact with the floor or other object? Patient found on his back by PCT. Nurse:  Sneha Linares Was this an assisted fall? no   Was fall witnessed? No   If witnessed, what part of the body made contact with the floor or other object? N/A   Patients mental status after the fall/when found: Alert and oriented   Any apparent injury:  No apparent injury   Immediate interventions for injury/suspected injury? No interventions needed   Patient assisted back to bed? Assist X2 and Other and walker   Name of provider notified and time, any comments? Dr. Sonia Maurice Name of family member notified and time: Wife is waiting outside to  patient. Immediate VS and physical assessment documented in flow sheets. Neuro assessment every hour x 4 (for potential head injury or unwitnessed fall) documented in flow sheets.       Aneesh Blair RN

## 2022-04-06 NOTE — DISCHARGE INSTRUCTIONS
No retinal tears or retinal detachment seen on clinical exam today. Reviewed the signs and symptoms of retinal tear/retinal detachment and the importance of calling for prompt evaluation should there be increasing floaters, new flashing lights, or decreasing peripheral vision in either eye at any time. Observation recommended. Patient Education        Diverticulitis: Care Instructions  Overview     Diverticulitis occurs when pouches form in the wall of the colon and become inflamed or infected. It can be very painful. Doctors aren't sure what causes diverticulitis. There is no proof that foods such as nuts, seeds, or berries cause it or make it worse. A low-fiber diet may cause the colon to work harder to push stool forward. Pouches may form because of this extra work. It may be hard to think about healthy eating while you're in pain. But as you recover, you might think about how you can use healthy eating for overall better health. Healthy eating may help you avoid future attacks. Follow-up care is a key part of your treatment and safety. Be sure to make and go to all appointments, and call your doctor if you are having problems. It's also a good idea to know your test results and keep a list of the medicines you take. How can you care for yourself at home? · Drink plenty of fluids. If you have kidney, heart, or liver disease and have to limit fluids, talk with your doctor before you increase the amount of fluids you drink. · Stay with liquids or a bland diet (plain rice, bananas, dry toast or crackers, applesauce) until you are feeling better. Then you can return to regular foods and slowly increase the amount of fiber in your diet. · Use a heating pad set on low on your belly to relieve mild cramps and pain. · Get extra rest until you are feeling better. · Be safe with medicines. Read and follow all instructions on the label. ? If the doctor gave you a prescription medicine for pain, take it as prescribed. ? If you are not taking a prescription pain medicine, ask your doctor if you can take an over-the-counter medicine. · If your doctor prescribed antibiotics, take them as directed. Do not stop taking them just because you feel better. You need to take the full course of antibiotics.   · Do not use laxatives or enemas unless your doctor tells you to use them. When should you call for help? Call your doctor now or seek immediate medical care if:    · You have a fever.     · You are vomiting.     · You have new or worse belly pain.     · You cannot pass stools or gas. Watch closely for changes in your health, and be sure to contact your doctor if you have any problems. Where can you learn more? Go to http://www.gray.com/  Enter H901 in the search box to learn more about \"Diverticulitis: Care Instructions. \"  Current as of: September 8, 2021               Content Version: 13.2  © 7899-8668 [employer]. Care instructions adapted under license by [employer] (which disclaims liability or warranty for this information). If you have questions about a medical condition or this instruction, always ask your healthcare professional. [address] any warranty or liability for your use of this information. Sigmoidectomy: What to Expect at [facility]. are likely to have pain that comes and goes for the next few days after bowel surgery. You may have bowel cramps, and your cut (incision) may hurt. You may also feel like you have the flu. You may have a low fever and feel tired and nauseated. This is common. You should feel better after a week and will probably be back to normal in 2 to 3 weeks. This care sheet gives you a general idea about how long it will take for you to recover. But each person recovers at a different pace. Follow the steps below to get better as quickly as possible. How can you care for yourself at home? Activity  · Rest when you feel tired. Getting enough sleep will help you recover. · Try to walk each day. Start by walking a little more than you did the day before. Bit by bit, increase the amount you walk. Walking boosts blood flow and helps prevent pneumonia and constipation.   · Avoid strenuous activities, such as biking, jogging, weight lifting, or aerobic exercise, until your doctor says it is okay. · Ask your doctor when you can drive again. · You will probably need to take 3 to 4 weeks off from work. It depends on the type of work you do and how you feel. You may need to take off 4 to 6 weeks if you lift heavy objects in your job. · You may shower 24 to 48 hours after surgery, if your doctor says it is okay. Pat the cut (incision) dry. Do not take a bath for the first 2 weeks, or until your doctor tells you it is okay. · Ask your doctor when it is okay for you to have sex. Diet    · You may not have much appetite after the surgery. · Drink plenty of fluids to avoid becoming dehydrated. · Eat a low-fiber diet for three weeks after surgery. Its also ok to eat many small meals throughout the day. · Eat yogurt. It puts good bacteria into your colon and helps prevent diarrhea. · Try to avoid nuts, seeds, and corn for a while. They may be hard to digest.  · You may need to take vitamins that contain sodium and potassium. Ask your doctor. Medicines  · Your doctor will tell you if and when you can restart your medicines. He or she will also give you instructions about taking any new medicines. · If you take blood thinners, such as warfarin (Coumadin), clopidogrel (Plavix), or aspirin, be sure to talk to your doctor. He or she will tell you if and when to start taking those medicines again. Make sure that you understand exactly what your doctor wants you to do. · Take pain medicines exactly as directed. ¨ If the doctor gave you a prescription medicine for pain, take it as prescribed. ¨ If you are not taking a prescription pain medicine, ask your doctor if you can take an over-the-counter medicine. ¨ Be sure to take your pain medicine with food. Your urine should also remain clear, which is a good sign that you are hydrated. Take all pain medicines as prescribed.   Remember, too much acetaminophen (Tylenol) can be harmful. · If you think your pain medicine is making you sick to your stomach:  ¨ Take your medicine after meals (unless your doctor tells you not to). ¨ Ask your doctor for a different pain medicine. · If your doctor prescribed antibiotics, take them as directed. Do not stop taking them just because you feel better. You need to take the full course of antibiotics. · You may need to take some medicines in a different form. You will be told whether to crush pills or take a liquid form of the medicine. · If your doctor gives you a stool softener, take it as directed. If your stool remains soft and regular for three days, you may take the stool softener as needed. Incision care  · Shower daily and wash your incisions with clean hands and mild soap and water. · Wash the area daily with warm, soapy water, and pat dry. No oils or lotions on your incisions until instructed. · If you have strips of tape on the incision, leave the tape on for a week or until it falls off. Follow-up care is a key part of your treatment and safety. Be sure to make and go to all appointments, and call your doctor if you are having problems. It's also a good idea to know your test results and keep a list of the medicines you take. When should you call for help? Call 911 anytime you think you may need emergency care. For example, call if:  · You passed out (lost consciousness). · You have sudden chest pain and shortness of breath, or you cough up blood. · You have severe pain in your belly. Call your doctor now or seek immediate medical care if:  · You are sick to your stomach and cannot drink fluids or keep them down. · You have signs of a blood clot, such as:  ¨ Pain in your calf, back of the knee, thigh, or groin. ¨ Redness and swelling in your leg or groin. · You have a lot of diarrhea that smells very bad.   · You have trouble passing urine or stool, especially if you have mild pain or swelling in your lower belly.  · You have signs of infection, such as:  ¨ Increased pain, swelling, warmth, or redness. ¨ Red streaks leading from the incision. ¨ Pus draining from the incision. ¨ A fever. · You have pain that does not get better after you take pain medicine. · You have loose stitches, or your incision comes open. · You are bleeding or have new drainage from the incision. Watch closely for any changes in your health, and be sure to contact your doctor if:  · You do not have a bowel movement after taking a laxative. · You do not get better as expected. Patient Education        Low-Fiber Diet: Care Instructions  Overview     When your bowels are irritated, you may need to limit fiber in your diet until the problem clears up. Your doctor and dietitian can help you design a low-fiber diet based on your health and what you prefer to eat. Ask your doctor how long you should stay on a low-fiber diet. Your doctor probably will have you start adding more fiber to your diet as you get better. Always talk with your doctor or dietitian before you make changes in your diet. Follow-up care is a key part of your treatment and safety. Be sure to make and go to all appointments, and call your doctor if you are having problems. It's also a good idea to know your test results and keep a list of the medicines you take. How can you care for yourself at home? · Choose white or refined grains, and avoid whole grains. That means eating white or refined cereals, breads, crackers, rice, or pasta. · Choose fruits and vegetables that have been peeled and cooked. Avoid fruits and vegetables that are raw or that have skins, seeds, or hulls. ? Eat cooked or canned fruit. Low-fiber fruits include applesauce, canned peaches, canned pears, and fruit juice without pulp. ? Eat low-fiber vegetables, which include well-cooked vegetables and vegetable juice.   · Drink or eat milk, yogurt, or other milk products if you can digest dairy without too many problems. Your doctor may limit milk and milk products for a while. If so, they may recommend a calcium and vitamin D supplement. · Eat well-cooked meat, such as chicken, turkey, fish, or lean meat. You also can eat eggs and tofu. · Avoid these foods:  ? Bran, brown or wild rice, oatmeal, granola, corn, lena crackers, barley, and whole wheat and other whole-grain breads, such as rye bread  ? Cereals with more than 2 grams of fiber a serving  ? Berries, rhubarb, prunes, prune juice, and all dried fruits  ? Raw vegetables and fruits  ? Foods that may cause gas, such as raw or cooked cabbage, broccoli, brussels sprouts, and cauliflower  ? Cooked dried beans, lentils, and split peas  ? Crunchy peanut butter  ? Ice cream with fruit pieces in it  ? Coconut, nuts, popcorn, raisins, and seeds, or any ice cream, yogurt, or cheese with these in them  Where can you learn more? Go to http://www.gray.com/  Enter V133 in the search box to learn more about \"Low-Fiber Diet: Care Instructions. \"  Current as of: September 8, 2021               Content Version: 13.2  © 2006-2022 [employer]. Care instructions adapted under license by [employer] (which disclaims liability or warranty for this information). If you have questions about a medical condition or this instruction, always ask your healthcare professional. [place] [address] any warranty or liability for your use of this information.

## 2022-04-06 NOTE — PROGRESS NOTES
[facility] called. Dr. Nice Form paged. Patient states that he walked from the chair to the bathroom, states that \"his legs gave out\". PCT reports that patient was flat on his back with head facing inside the doorway, denies pain. PCT helped patient off the floor onto the [facility], minimal assist x1 up to [facility] by PCT. 16:00-Helped up from the [facility] with x2 max assist and walker, ambulated to the bed with walker and one assist, repositioned into bed, call bell given. Bed alarm initiated. Side rails up x2.

## 2022-04-06 NOTE — DISCHARGE SUMMARY
Discharge Summary     Patient: Malika Ball               Sex: male          DOA: [unfilled]       YOB: 1933      Age:  80 y.o.        LOS:  LOS: 7 days                 Admit Date: 3/30/2022     Discharge Date: 4/6/2022     Admission Diagnoses: S/P exploratory laparotomy [Z98.890]     Discharge Diagnoses:               Problem List as of 4/6/2022 Date Reviewed: 9/25/2019           Codes Class Noted - Resolved     S/P exploratory laparotomy ICD-10-CM: Z98.890  ICD-9-CM: V45.89   3/30/2022 - Present           Type II or unspecified type diabetes mellitus without mention of complication, not stated as uncontrolled ICD-10-CM: E11.9  ICD-9-CM: 250.00   2/27/2013 - Present           Osteoarthrosis, unspecified whether generalized or localized, lower leg ICD-10-CM: [id number]  ICD-9-CM: 715.96   5/15/2012 - Present           Unspecified asthma(493.90) ICD-10-CM: J45.909  ICD-9-CM: 493.90   4/15/2011 - Present           Essential hypertension, benign ICD-10-CM: I10  ICD-9-CM: 941. 1   2/2/2010 - Present           Mixed hyperlipidemia ICD-10-CM: E78.2  ICD-9-CM: 272.2   2/2/2010 - Present           Osteoarthrosis, unspecified whether generalized or localized, unspecified site ICD-10-CM: M19.90  ICD-9-CM: 715.90   2/2/2010 - Present           Hypertrophy of prostate without urinary obstruction and other lower urinary tract symptoms (LUTS) ICD-10-CM: N40.0  ICD-9-CM: 600.00   2/2/2010 - Present           Impotence of organic origin ICD-10-CM: N52.9  ICD-9-CM: 607.84   2/2/2010 - Present                   Discharge Condition: Good     Hospital Course: Pt arrived to hospital on 3/30/22 with concerns of perforated sigmoid colon and recent covid infection. Pt taken to OR on 3/30/22 for ex lap and sigmoid colectomy. Pt tolerated procedure well. Had some issues post op with distention, but currently tolerating diet without n/v, passing BMs. Has been able to wean off O2 and sating approx 93-95%.   Will have home PT set up upon discharge.          Significant Diagnostic Studies:see full electronic record      Discharge Medications:          Current Discharge Medication List       START taking these medications     Details   oxyCODONE IR (ROXICODONE) 5 mg immediate release tablet Take 1 Tablet by mouth every four (4) hours as needed for Pain for up to 3 days. Max Daily Amount: 30 mg.  Qty: 12 Tablet, Refills: 0     Associated Diagnoses: S/P exploratory laparotomy       docusate sodium (COLACE) 50 mg capsule Take 2 Capsules by mouth two (2) times a day for 30 days. Qty: 60 Capsule, Refills: 0                CONTINUE these medications which have NOT CHANGED     Details   mometasone (NASONEX) 50 mcg/actuation nasal spray 2 Sprays by Both Nostrils route daily. Qty: 1 Container, Refills: 5     Associated Diagnoses: Allergic rhinitis due to other allergen       !! albuterol (PROVENTIL HFA) 90 mcg/actuation inhaler Take 2 Puffs by inhalation every four (4) hours as needed for Wheezing or Shortness of Breath. Qty: 1 Inhaler, Refills: 1     Associated Diagnoses: Unspecified asthma(493.90)       !! losartan-hydrochlorothiazide (HYZAAR) 100-25 mg per tablet Take 1 Tab by mouth daily. Qty: 90 Tab, Refills: 3     Associated Diagnoses: Essential hypertension, benign       azithromycin (ZITHROMAX) 250 mg tablet Take 250 mg by mouth every Monday, Wednesday, Friday.       !! albuterol (PROAIR HFA) 90 mcg/actuation inhaler Take 90 mcg by inhalation every four (4) hours as needed.       fluorouracil (EFUDEX) 5 % chemo cream Apply twice daily for 4 weeks to arms, hands and shins  Qty: 80 g, Refills: 0       !! diclofenac EC (VOLTAREN) 50 mg EC tablet TAKE 1 TABLET BY MOUTH BY MOUTH 2 TIMES A DAY.        clindamycin (CLEOCIN) 300 mg capsule TAKE 1 CAPSULE THREE TIMES DAILY  Refills: 0       FLUZONE HIGH-DOSE 2017-18, PF, syrg injection TO BE ADMINISTERED BY PHARMACIST FOR IMMUNIZATION  Refills: 0       predniSONE (STERAPRED DS) 10 mg dose pack TAKE AS DIRECTED ON PACKAGE  Refills: 0       ciprofloxacin-dexamethasone (CIPRODEX) 0.3-0.1 % otic suspension         meloxicam (MOBIC) 15 mg tablet TAKE 1 TABLET (15 MG TOTAL) BY MOUTH DAILY.     ofloxacin (FLOXIN) 0.3 % ophthalmic solution         budesonide-formoterol (SYMBICORT) 80-4.5 mcg/actuation HFAA         tiZANidine (ZANAFLEX) 4 mg tablet Take 4 mg by mouth.       !! losartan-hydroCHLOROthiazide (HYZAAR) 100-25 mg per tablet Take 1 Tab by mouth.       simvastatin (ZOCOR) 20 mg tablet TAKE 1 TABLET AT NIGHT       !! diclofenac EC (VOLTAREN) 50 mg EC tablet TAKE 1 TABLET BY MOUTH BY MOUTH 2 TIMES A DAY. Refills: 0       CHOLECALCIFEROL, VITAMIN D3, (VITAMIN D3 PO) Take  by mouth.     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       niacin ER (NIASPAN) 500 mg tablet Take 1 Tab by mouth daily (after dinner). Qty: 90 Tab, Refills: 3     Associated Diagnoses: Mixed hyperlipidemia       HYDROcodone-homatropine (HYCODAN) 5-1.5 mg/5 mL (5 mL) syrup Take 5 mL by mouth three (3) times daily as needed. Qty: 150 mL, Refills: 0     Associated Diagnoses: Acute maxillary sinusitis       levofloxacin (LEVAQUIN) 500 mg tablet Take 1 Tab by mouth daily. Qty: 10 Tab, Refills: 0     Associated Diagnoses: Acute maxillary sinusitis       tamsulosin (FLOMAX) 0.4 mg capsule Take 0.4 mg by mouth daily.       celecoxib (CELEBREX) 200 mg capsule Take 1 Cap by mouth as directed. Take 1 tablet once or twice a day as needed for arthritis pain.   Qty: 180 Cap, Refills: 3     Associated Diagnoses: Osteoarthrosis, unspecified whether generalized or localized, unspecified site       Blood-Glucose Meter (ONE TOUCH ULTRA SYSTEM KIT) monitoring kit Use as directed daily  Qty: 1 Kit, Refills: 0     Associated Diagnoses: Type II or unspecified type diabetes mellitus without mention of complication, not stated as uncontrolled       glucose blood VI test strips (ONE TOUCH ULTRA TEST) strip Monitor BS daily  Qty: 1 Package, Refills: 11     Associated Diagnoses: Type II or unspecified type diabetes mellitus without mention of complication, not stated as uncontrolled       Lancets (ONE TOUCH ULTRASOFT LANCETS) Misc Monitor BS daily  Qty: 1 Package, Refills: 11     Associated Diagnoses: Type II or unspecified type diabetes mellitus without mention of complication, not stated as uncontrolled       aspirin 81 mg tablet Take 81 mg by mouth.         finasteride (PROSCAR) 5 mg tablet Take 5 mg by mouth daily.       alfuzosin (UROXATRAL) 10 mg SR tablet Take 10 mg by mouth daily.        !! - Potential duplicate medications found. Please discuss with provider.             Activity: No lifting, Driving, or Strenuous exercise for at least 2 weeks.   Can discuss at follow up visit     Diet: Regular Diet- low fiber     Wound Care: Keep wound clean and dry     Follow-up: 2 weeks with Dr Derrick Holland

## 2022-04-06 NOTE — PROGRESS NOTES
4/6/2022   CARE MANAGEMENT NOTE:  CM reviewed EMR.   Pt was admitted with perforated diverticulitis; also Covid positive. Reportedly, pt resides with his wife Felicia Paniagua ([phone]).    RUR 13%; LOS 7 days     Transition Plan of Care:  1.  General Surgery is following for medical management - pt is s/p ex lap; sigmoid colectomy on 3/30  2. Alex Alonso is for pt to return home with his wife  3.  CM received home health skilled nursing, PT orders and a referral was made to [facility] per wife's request and they have accepted. AVS and summary was faxed to agency. 4.  Rolling walker was provided to pt from [facility] closet maintained by [employer]  5.  Home oxygen eval prior to discharge as appropriate. Pt is currently on room air. 6.  Outpt f/u  7.  Wife will transport pt home     CM will continue to follow pt until discharged.   Nika

## 2022-04-06 NOTE — PROGRESS NOTES
Jarett Quintero [facility] 79  [address]  [phone]      Medical Progress Note      NAME: Roderick Tsai   :  3/26/1933  MRM:  [id number]    Date of service: 2022  10:05 AM       Assessment and Plan:   1. Perforated sigmoid colon. Status post exploratory laparoscopy. Management per surgical team.     2. COPD. Does not appear to be acutely exacerbated. Continue LAMA, LABA. 3.  Dyslipidemia. Continue statins.     4. BPH. Asymptomatic, continue finasteride. Monitor for urinary retention.     5. Hypertension. Continue losartan, and amlodipine( increased dose). 6.  COVID-19 positive. Reports recent COVID-19 infection. Patient is still tested positive for COVID-19. Asymptomatic, SPO2 WNL on RA. Continue supportive measures.      pt is medically stable, we will sign off. Please call us with questions. Subjective:     Chief Complaint[de-identified] Patient was seen and examined as a follow up for perforated sigmoid colon. Chart was reviewed. feels well. Has bowel movement and feels well     ROS:  (bold if positive, if negative)    Tolerating PT  Tolerating Diet        Objective:     Last 24hrs VS reviewed since prior progress note.  Most recent are:    Visit Vitals  BP (!) 168/72 (BP 1 Location: Right upper arm, BP Patient Position: At rest)   Pulse 73   Temp 98.1 °F (36.7 °C)   Resp 16   Ht 5' 5\" (1.651 m)   Wt 69 kg (152 lb 1.9 oz)   SpO2 95%   BMI 25.31 kg/m²     SpO2 Readings from Last 6 Encounters:   22 95%   19 97%   19 94%   04/10/19 95%   19 97%   18 97%    O2 Flow Rate (L/min): 2 l/min (Oxygendecreased to 1L; O2 sat 95% on 1L)       Intake/Output Summary (Last 24 hours) at 2022 1005  Last data filed at 2022 0526  Gross per 24 hour   Intake 240 ml   Output 1540 ml   Net -1300 ml        Physical Exam:    Gen:  Well-developed, well-nourished, in no acute distress  HEENT:  Pink conjunctivae, PERRL, hearing intact to voice, moist mucous membranes  Neck:  Supple, without masses, thyroid non-tender  Resp:  No accessory muscle use, clear breath sounds without wheezes rales or rhonchi  Card:  No murmurs, normal S1, S2 without thrills, bruits or peripheral edema  Abd:  Soft, non-tender, non-distended, normoactive bowel sounds are present, no palpable organomegaly and no detectable hernias  Lymph:  No cervical or inguinal adenopathy  Musc:  No cyanosis or clubbing  Skin:  No rashes or ulcers, skin turgor is good  Neuro:  Cranial nerves are grossly intact, no focal motor weakness, follows commands appropriately  Psych:  Good insight, oriented to person, place and time, alert  __________________________________________________________________  Medications Reviewed: (see below)  Medications:     Current Facility-Administered Medications   Medication Dose Route Frequency    potassium bicarb-citric acid (EFFER-K) tablet 40 mEq  40 mEq Oral ONCE    metoprolol succinate (TOPROL-XL) XL tablet 25 mg  25 mg Oral DAILY    amLODIPine (NORVASC) tablet 5 mg  5 mg Oral DAILY    hydrALAZINE (APRESOLINE) 20 mg/mL injection 10 mg  10 mg IntraVENous Q6H PRN    polyethylene glycol (MIRALAX) packet 17 g  17 g Oral DAILY    oxymetazoline (AFRIN) 0.05 % nasal spray 2 Spray  2 Spray Both Nostrils BID    ondansetron (ZOFRAN) injection 4 mg  4 mg IntraVENous Q4H PRN    melatonin tablet 3 mg  3 mg Oral QHS PRN    docusate sodium (COLACE) capsule 100 mg  100 mg Oral BID    dicyclomine (BENTYL) capsule 10 mg  10 mg Oral QID    enoxaparin (LOVENOX) injection 40 mg  40 mg SubCUTAneous Q24H    sodium chloride (NS) flush 5-40 mL  5-40 mL IntraVENous Q8H    oxyCODONE IR (ROXICODONE) tablet 5 mg  5 mg Oral Q4H PRN    acetaminophen (TYLENOL) tablet 650 mg  650 mg Oral Q6H    finasteride (PROSCAR) tablet 5 mg  5 mg Oral DAILY    losartan (COZAAR) tablet 100 mg  100 mg Oral DAILY    tamsulosin (FLOMAX) capsule 0.4 mg  0.4 mg Oral DAILY    budesonide-formoterol (SYMBICORT) 80-4.5 mcg inhaler  2 Puff Inhalation BID RT    albuterol (PROVENTIL HFA, VENTOLIN HFA, PROAIR HFA) inhaler 2 Puff  2 Puff Inhalation Q4H PRN    morphine injection 2 mg  2 mg IntraVENous Q4H PRN        Lab Data Reviewed: (see below)  Lab Review:     Recent Labs     04/06/22 0530 04/05/22 0224   WBC 10.2 9.2   HGB 10.2* 9.9*   HCT 31.7* 30.4*    310     Recent Labs     04/06/22 0530 04/05/22 0224    135*   K 3.4* 3.7    100   CO2 27 32   GLU 85 133*   BUN 8 9   CREA 0.41* 0.54*   CA 7.4* 8.3*   MG 1.9 2.1   ALB 1.4* 1.6*   TBILI 0.3 0.4   ALT 11* 10*     Lab Results   Component Value Date/Time    Glucose (POC) 135 (H) 04/04/2022 11:14 AM    Glucose (POC) 179 (H) 03/30/2022 10:14 AM    Glucose (POC) 117 (H) 01/27/2014 10:12 AM    Glucose (POC) 154 (H) 01/27/2014 06:47 AM    Glucose (POC) 177 (H) 04/03/2013 04:25 PM     No results for input(s): PH, PCO2, PO2, HCO3, FIO2 in the last 72 hours. No results for input(s): INR, INREXT in the last 72 hours. All Micro Results     Procedure Component Value Units Date/Time    CULTURE, BLOOD, PERIPHERAL [[id number]] Collected: 03/30/22 0237    Order Status: Completed Specimen: Blood Updated: 04/05/22 0742     Special Requests: NO SPECIAL REQUESTS        Culture result: NO GROWTH 6 DAYS       COVID-19 RAPID TEST [[id number]]  (Abnormal) Collected: 03/30/22 0233    Order Status: Completed Specimen: Nasopharyngeal Updated: 03/30/22 0308     Specimen source Nasopharyngeal        COVID-19 rapid test Detected        Comment: Rapid Abbott ID Now       The specimen is POSITIVE for SARS-CoV-2, the novel coronavirus associated with COVID-19. This test has been authorized by the FDA under an Emergency Use Authorization (EUA) for use by authorized laboratories.         Fact sheet for Healthcare Providers: ConventionUpdate.co.nz  Fact sheet for Patients: ConventionUpdate.co.nz Methodology: Isothermal Nucleic Acid Amplification  CALLED TO AND READ BACK BY  Va@hotmail.com         URINE CULTURE HOLD SAMPLE [[id number]] Collected: 03/30/22 0128    Order Status: Completed Specimen: Urine from Serum Updated: 03/30/22 0136     Urine culture hold       Urine on hold in Microbiology dept for 2 days. If unpreserved urine is submitted, it cannot be used for addtional testing after 24 hours, recollection will be required. I have reviewed notes of prior 24hr. Other pertinent lab: Total time spent with patient: 28 I personally reviewed chart, notes, data and current medications in the medical record. I have personally examined and treated the patient at bedside during this period.                  Care Plan discussed with: Patient, Nursing Staff and >50% of time spent in counseling and coordination of care    Discussed:  Care Plan    Prophylaxis:  Lovenox    Disposition:  Home w/Family           ___________________________________________________    Attending Physician: Andreina Rich MD

## 2022-04-06 NOTE — PROGRESS NOTES
Bedside, Verbal and Written shift change report given to [facility] RN (oncoming nurse) by Nilam Bates RN (offgoing nurse). Report included the following information SBAR, Kardex, ED Summary, OR Summary, Intake/Output, MAR, Recent Results and Med Rec Status.

## 2022-04-06 NOTE — DISCHARGE SUMMARY
Discharge Summary    Patient: Betty Mantilla               Sex: male          DOA: [unfilled]       YOB: 1933      Age:  80 y.o.        LOS:  LOS: 7 days                Admit Date: 3/30/2022    Discharge Date: 4/6/2022    Admission Diagnoses: S/P exploratory laparotomy [Z98.890]    Discharge Diagnoses:    Problem List as of 4/6/2022 Date Reviewed: 9/25/2019          Codes Class Noted - Resolved    S/P exploratory laparotomy ICD-10-CM: Z98.890  ICD-9-CM: V45.89  3/30/2022 - Present        Type II or unspecified type diabetes mellitus without mention of complication, not stated as uncontrolled ICD-10-CM: E11.9  ICD-9-CM: 250.00  2/27/2013 - Present        Osteoarthrosis, unspecified whether generalized or localized, lower leg ICD-10-CM: ETE4843  ICD-9-CM: 715.96  5/15/2012 - Present        Unspecified asthma(493.90) ICD-10-CM: J45.909  ICD-9-CM: 493.90  4/15/2011 - Present        Essential hypertension, benign ICD-10-CM: I10  ICD-9-CM: 401.1  2/2/2010 - Present        Mixed hyperlipidemia ICD-10-CM: E78.2  ICD-9-CM: 272.2  2/2/2010 - Present        Osteoarthrosis, unspecified whether generalized or localized, unspecified site ICD-10-CM: M19.90  ICD-9-CM: 715.90  2/2/2010 - Present        Hypertrophy of prostate without urinary obstruction and other lower urinary tract symptoms (LUTS) ICD-10-CM: N40.0  ICD-9-CM: 600.00  2/2/2010 - Present        Impotence of organic origin ICD-10-CM: N52.9  ICD-9-CM: 607.84  2/2/2010 - Present              Discharge Condition: Good    Hospital Course: Pt arrived to hospital on 3/30/22 with concerns of perforated sigmoid colon and recent covid infection. Pt taken to OR on 3/30/22 for ex lap and sigmoid colectomy. Pt tolerated procedure well. Had some issues post op with distention, but currently tolerating diet without n/v, passing BMs. Has been able to wean off O2 and sating approx 93-95%. Will have home PT set up upon discharge.         Significant Diagnostic Studies:see full electronic record     Discharge Medications:     Current Discharge Medication List      START taking these medications    Details   oxyCODONE IR (ROXICODONE) 5 mg immediate release tablet Take 1 Tablet by mouth every four (4) hours as needed for Pain for up to 3 days. Max Daily Amount: 30 mg.  Qty: 12 Tablet, Refills: 0    Associated Diagnoses: S/P exploratory laparotomy      docusate sodium (COLACE) 50 mg capsule Take 2 Capsules by mouth two (2) times a day for 30 days. Qty: 60 Capsule, Refills: 0         CONTINUE these medications which have NOT CHANGED    Details   mometasone (NASONEX) 50 mcg/actuation nasal spray 2 Sprays by Both Nostrils route daily. Qty: 1 Container, Refills: 5    Associated Diagnoses: Allergic rhinitis due to other allergen      !! albuterol (PROVENTIL HFA) 90 mcg/actuation inhaler Take 2 Puffs by inhalation every four (4) hours as needed for Wheezing or Shortness of Breath. Qty: 1 Inhaler, Refills: 1    Associated Diagnoses: Unspecified asthma(493.90)      ! ! losartan-hydrochlorothiazide (HYZAAR) 100-25 mg per tablet Take 1 Tab by mouth daily. Qty: 90 Tab, Refills: 3    Associated Diagnoses: Essential hypertension, benign      azithromycin (ZITHROMAX) 250 mg tablet Take 250 mg by mouth every Monday, Wednesday, Friday. !! albuterol (PROAIR HFA) 90 mcg/actuation inhaler Take 90 mcg by inhalation every four (4) hours as needed. fluorouracil (EFUDEX) 5 % chemo cream Apply twice daily for 4 weeks to arms, hands and shins  Qty: 80 g, Refills: 0      !! diclofenac EC (VOLTAREN) 50 mg EC tablet TAKE 1 TABLET BY MOUTH BY MOUTH 2 TIMES A DAY.       clindamycin (CLEOCIN) 300 mg capsule TAKE 1 CAPSULE THREE TIMES DAILY  Refills: 0      FLUZONE HIGH-DOSE 2017-18, PF, syrg injection TO BE ADMINISTERED BY PHARMACIST FOR IMMUNIZATION  Refills: 0      predniSONE (STERAPRED DS) 10 mg dose pack TAKE AS DIRECTED ON PACKAGE  Refills: 0      ciprofloxacin-dexamethasone (CIPRODEX) 0. 3-0.1 % otic suspension       meloxicam (MOBIC) 15 mg tablet TAKE 1 TABLET (15 MG TOTAL) BY MOUTH DAILY. ofloxacin (FLOXIN) 0.3 % ophthalmic solution       budesonide-formoterol (SYMBICORT) 80-4.5 mcg/actuation HFAA       tiZANidine (ZANAFLEX) 4 mg tablet Take 4 mg by mouth. !! losartan-hydroCHLOROthiazide (HYZAAR) 100-25 mg per tablet Take 1 Tab by mouth. simvastatin (ZOCOR) 20 mg tablet TAKE 1 TABLET AT NIGHT      !! diclofenac EC (VOLTAREN) 50 mg EC tablet TAKE 1 TABLET BY MOUTH BY MOUTH 2 TIMES A DAY. Refills: 0      CHOLECALCIFEROL, VITAMIN D3, (VITAMIN D3 PO) Take  by mouth. 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 niacin ER (NIASPAN) 500 mg tablet Take 1 Tab by mouth daily (after dinner). Qty: 90 Tab, Refills: 3    Associated Diagnoses: Mixed hyperlipidemia      HYDROcodone-homatropine (HYCODAN) 5-1.5 mg/5 mL (5 mL) syrup Take 5 mL by mouth three (3) times daily as needed. Qty: 150 mL, Refills: 0    Associated Diagnoses: Acute maxillary sinusitis      levofloxacin (LEVAQUIN) 500 mg tablet Take 1 Tab by mouth daily. Qty: 10 Tab, Refills: 0    Associated Diagnoses: Acute maxillary sinusitis      tamsulosin (FLOMAX) 0.4 mg capsule Take 0.4 mg by mouth daily. celecoxib (CELEBREX) 200 mg capsule Take 1 Cap by mouth as directed. Take 1 tablet once or twice a day as needed for arthritis pain.   Qty: 180 Cap, Refills: 3    Associated Diagnoses: Osteoarthrosis, unspecified whether generalized or localized, unspecified site      Blood-Glucose Meter (ONE TOUCH ULTRA SYSTEM KIT) monitoring kit Use as directed daily  Qty: 1 Kit, Refills: 0    Associated Diagnoses: Type II or unspecified type diabetes mellitus without mention of complication, not stated as uncontrolled      glucose blood VI test strips (ONE TOUCH ULTRA TEST) strip Monitor BS daily  Qty: 1 Package, Refills: 11    Associated Diagnoses: Type II or unspecified type diabetes mellitus without mention of complication, not stated as uncontrolled      Lancets (ONE TOUCH ULTRASOFT LANCETS) Misc Monitor BS daily  Qty: 1 Package, Refills: 11    Associated Diagnoses: Type II or unspecified type diabetes mellitus without mention of complication, not stated as uncontrolled      aspirin 81 mg tablet Take 81 mg by mouth. finasteride (PROSCAR) 5 mg tablet Take 5 mg by mouth daily. alfuzosin (UROXATRAL) 10 mg SR tablet Take 10 mg by mouth daily. !! - Potential duplicate medications found. Please discuss with provider. Activity: No lifting, Driving, or Strenuous exercise for at least 2 weeks.   Can discuss at follow up visit    Diet: Regular Diet- low fiber    Wound Care: Keep wound clean and dry    Follow-up: 2 weeks with Dr Jeny Smith    Pt seen and discussed with Dr Mayda Arechiga, NP

## 2022-04-06 NOTE — PROGRESS NOTES
General Surgery Daily Progress Note    Patient: Osiris Gonzáles MRN: [id number]  SSN: [id number]    YOB: 1933  Age: 80 y.o. Sex: male      Admit Date: 3/30/2022    POD 7 Days Post-Op    Procedure: Procedure(s):  LAPAROTOMY EXPLORATORY; SIGMOID COLECTOMY    Subjective:    Nancy soft diet  Had BM x 2  Denies N/V  Was OOB to chair    Current Facility-Administered Medications   Medication Dose Route Frequency    metoprolol succinate (TOPROL-XL) XL tablet 25 mg  25 mg Oral DAILY    amLODIPine (NORVASC) tablet 5 mg  5 mg Oral DAILY    hydrALAZINE (APRESOLINE) 20 mg/mL injection 10 mg  10 mg IntraVENous Q6H PRN    polyethylene glycol (MIRALAX) packet 17 g  17 g Oral DAILY    oxymetazoline (AFRIN) 0.05 % nasal spray 2 Spray  2 Spray Both Nostrils BID    ondansetron (ZOFRAN) injection 4 mg  4 mg IntraVENous Q4H PRN    melatonin tablet 3 mg  3 mg Oral QHS PRN    docusate sodium (COLACE) capsule 100 mg  100 mg Oral BID    dicyclomine (BENTYL) capsule 10 mg  10 mg Oral QID    enoxaparin (LOVENOX) injection 40 mg  40 mg SubCUTAneous Q24H    sodium chloride (NS) flush 5-40 mL  5-40 mL IntraVENous Q8H    oxyCODONE IR (ROXICODONE) tablet 5 mg  5 mg Oral Q4H PRN    acetaminophen (TYLENOL) tablet 650 mg  650 mg Oral Q6H    finasteride (PROSCAR) tablet 5 mg  5 mg Oral DAILY    losartan (COZAAR) tablet 100 mg  100 mg Oral DAILY    tamsulosin (FLOMAX) capsule 0.4 mg  0.4 mg Oral DAILY    budesonide-formoterol (SYMBICORT) 80-4.5 mcg inhaler  2 Puff Inhalation BID RT    albuterol (PROVENTIL HFA, VENTOLIN HFA, PROAIR HFA) inhaler 2 Puff  2 Puff Inhalation Q4H PRN    morphine injection 2 mg  2 mg IntraVENous Q4H PRN        Objective:   04/06 0701 - 04/06 1900  In: 720 [P.O.:720]  Out: 1350 [Urine:1350]  04/04 1901 - 04/06 0700  In: 880 [P.O.:880]  Out: 2490 [Urine:2400; Drains:90]  Patient Vitals for the past 8 hrs:   BP Temp Pulse Resp SpO2   04/06/22 1137 (!) 146/67 98 °F (36.7 °C) 70 18 95 %   04/06/22 0910 (!) 168/72 98.1 °F (36.7 °C) 73 16 95 %   04/06/22 0832 -- -- -- -- 93 %       Physical Exam:  General: Alert, cooperative, NAD  Lungs: Unlabored  Abdomen: Soft, appropriately TTP, incisions C/D/I  Extremities: Warm, moves all, no edema  Skin:  Warm and dry, no rash    Labs:   Recent Labs     04/06/22  0530   WBC 10.2   HGB 10.2*   HCT 31.7*        Recent Labs     04/06/22  0530      K 3.4*      CO2 27   GLU 85   BUN 8   CREA 0.41*   CA 7.4*   MG 1.9   ALB 1.4*   TBILI 0.3   ALT 11*       Assessment / Plan:     POD 7 Days Post-Op    Procedure: Procedure(s):  LAPAROTOMY EXPLORATORY; SIGMOID COLECTOMY  Ok for home  Follow up in 2 weeks        Marky Dobson MD

## 2022-04-07 ENCOUNTER — PATIENT OUTREACH (OUTPATIENT)
Dept: CASE MANAGEMENT | Age: 87
End: 2022-04-07

## 2022-04-07 NOTE — PROGRESS NOTES
22     Care Transitions Initial Call    Attempted to reach patient for transitions of care initial call but his wife, who answered the phone, tells me he is sleeping right now. I introduced myself and the purpose of my call and asked if I may call back later today around 4 PM or if she would prefer I wait until tomorrow. She prefers tomorrow and I advised I will call back in tomorrow afternoon. I gave Mrs. Sandhya Stnoe my name again and my phone # and explained our role and usual schedule of calls (once weekly), informing that they are welcome to call me, too, if they ever have questions/concerns they'd like to address with me. I thanked her for her time, wished them both a good afternoon, and we disconnected. Jeanna Barone DNP, FNP-C, Care Transitions Team, () [phone]     Call within 2 business days of discharge: Yes     22     Spoke to pt today and introduced myself. He gives verbal permission for me to speak with his wife. Patient: Charle Essex Patient : 3/26/1933 MRN: [id number]    Last Discharge [facility]       Complaint Diagnosis Description Type Department Provider    3/30/22 Abdominal Pain; Positive For Covid-19 Diverticulitis of large intestine with perforation without bleeding . .. ED to Hosp-Admission (Discharged) Torri Meyer MD; Sumeet Jean. .. Was this an external facility discharge? No     Challenges to be reviewed by the provider   Additional needs identified to be addressed with provider: yes  --Labs- in hospital, pt had UA with protein of 30, also large for blood at same time. Seems to have been during Roth insertion. Does a UA need to be repeated? Results for Bird Perdue (MRN [id number]) as of 2022 13:33   Ref.  Range 3/30/2022 01:28   Color Latest Units:   YELLOW/STRAW   Appearance Latest Ref Range: CLEAR   CLEAR   Specific gravity Latest Ref Range: 1.003 - 1.030   1.025   pH (UA) Latest Ref Range: 5.0 - 8.0   6.0   Protein Latest Ref Range: NEG mg/dL 30 (A)   Glucose Latest Ref Range: NEG mg/dL Negative   Ketone Latest Ref Range: NEG mg/dL Negative   Blood Latest Ref Range: NEG   LARGE (A)   Bilirubin Latest Ref Range: NEG   Negative   Urobilinogen Latest Ref Range: 0.2 - 1.0 EU/dL 0.2   Nitrites Latest Ref Range: NEG   Negative   Leukocyte Esterase Latest Ref Range: NEG   Negative      Albumin and TP low--saw dietitian in hospital. On low-fiber diet. Wife will provide lean meats at home. Results for Weston Loving (MRN [id number]) as of 4/8/2022 13:33   Ref. Range 4/1/2022 05:37 4/2/2022 04:05 4/3/2022 04:08 4/5/2022 02:24 4/6/2022 05:30   Albumin Latest Ref Range: 3.5 - 5.0 g/dL 1.6 (L) 1.6 (L) 1.5 (L) 1.6 (L) 1.4 (L)   Globulin Latest Ref Range: 2.0 - 4.0 g/dL 4.0 3.8 3.6 3.6 3.1   --[facility]- [facility]--nurse came for intake yesterday  --COVID- positive in hospital but does not have any typical symptoms  --Appts- Mrs. Cooper Payment will call Monday for 1-wk F/U appt with you and 2-wk F/U appt with surgeon. Method of communication with provider : chart routing    Discussed COVID-19 related testing which was available at this time. Test results were positive. Patient informed of results, if available? yes     Advance Care Planning:   Does patient have an Advance Directive: has living will on file from 2000. Does not list HCDMs. Will discuss this with pt and wife on subsequent call. Inpatient Readmission Risk score: Unplanned Readmit Risk Score: 12.9 ( )    Was this a readmission?  no   Patient stated reason for the admission: abd pain    Patients top risk factors for readmission: medical condition-sigmoid colectomy   Interventions to address risk factors: Scheduled appointment with PCP-wife will call Monday to sched 1-wk F/U appt, Scheduled appointment with Julio Leland will call Monday to sched 2-wk F/U appt, Obtained and reviewed discharge summary and/or continuity of care documents and Assessment and support for treatment adherence and medication management-pt appears to have good understanding of current medication regimen which he manages    Care Transition Nurse (CTN) contacted the family by telephone to perform post hospital discharge assessment. Verified name and  with family as identifiers. Provided introduction to self, and explanation of the CTN role. CTN reviewed discharge instructions, medical action plan and red flags with family who verbalized understanding. Were discharge instructions available to patient? yes. Reviewed appropriate site of care based on symptoms and resources available to patient including: PCP, Specialist and [facility]. Family given an opportunity to ask questions and does not have any further questions or concerns at this time. The family agrees to contact the PCP office for questions related to their healthcare. Medication reconciliation was performed with family and patient, who verbalizes understanding of administration of home medications. Referral to Pharm D needed: no     Home Health/Outpatient orders at discharge: PT and [address]: [facility] [employer]  Date of initial visit: 22    Durable Medical Equipment ordered at discharge: Cane/Walker/Crutches rolling walker  [facility]: [address] received: in hospital    Covid Risk Education     CTN reviewed discharge instructions, medical action plan and red flag symptoms with the family who verbalized understanding. Discussed COVID vaccination status: no, will ask on subsequent call. Family was given an opportunity to verbalize any questions and concerns and agrees to contact CTN or health care provider for questions related to their healthcare. Was patient discharged with a pulse oximeter? no.   Discussed follow-up appointments. If no appointment was previously scheduled, appointment scheduling offered: no, pt sees non-BS provider. Is follow up appointment scheduled within 7 days of discharge? not yet, but pt's wife agrees to call on Monday to set up appts. [facility] follow up appointment(s): No future appointments. Non-[facility] follow up appointment(s): as above    Plan for follow-up call in 5-7 days based on severity of symptoms and risk factors. Plan for next call: symptom management-what symptoms is he having now? how is the diarrhea?, self management-how is is nutritional intake? is he getting physical activity each day?, follow up appointment-have you been able to schedule F/U appts? has he seen PCP yet? and how is it going with PT and the nursing visits? CTN provided contact information for future needs. Goals Addressed                 This Visit's Progress     Attends follow-up appointments as directed. 04/08/22     --pt will obtain and attend 1-wk F/U visit with PCP in-person or remotely  --pt will obtain and attend 2-wk F/U appt with surgeon  --pt has had initial intake with At [address] [place] [address] nurse and will have 5 more visits from her and PT twice weekly, per Mrs. Hortencia Harvey  --provided Mrs. Hortencia Harvey with [facility] services and phone # to use as needed    Next call, review above    Lucinda Aldana DNP, FNP-C, Care Transitions Team, (Ph) [phone]        COMPLETED: Prevent complications post hospitalization.  COMPLETED: Prevent complications post hospitalization.  Understands red flags post discharge. 04/08/22     --Mrs. Hortencia Harvey states that pt is feeling fatigued and has had diarrhea but [address] told her the incision \"looks great\"   --answered her questions about low-fiber diet  --encouraged pt to walk a little bit every day  --discussed pain management to enable him to work with therapy; can use Tylenol if not needing opioid pain medication. Goal is to be able to take deep breaths and increase physical activity    Next call reassess symptoms, especially diarrhea, nutrition, activity level and pain.     Lucinda Aldana DNP, FNP-C, Care Transitions Team, (Ph) [phone]

## 2022-04-22 ENCOUNTER — PATIENT OUTREACH (OUTPATIENT)
Dept: CASE MANAGEMENT | Age: 87
End: 2022-04-22

## 2022-05-06 ENCOUNTER — PATIENT OUTREACH (OUTPATIENT)
Dept: CASE MANAGEMENT | Age: 87
End: 2022-05-06

## 2022-05-06 NOTE — PROGRESS NOTES
05/06/22     Patient has graduated from the Transitions of Care Coordination  program on 5/6/22. Patient/family has the ability to self-manage at this time Care management goals have been completed. Patient was not referred to the [facility] team for further management. Goals Addressed                 This Visit's Progress     Attends follow-up appointments as directed. 04/08/22     --pt will obtain and attend 1-wk F/U visit with PCP in-person or remotely  --pt will obtain and attend 2-wk F/U appt with surgeon  --pt has had initial intake with [facility] [address] nurse and will have 5 more visits from her and PT twice weekly, per Mrs. Valeria Anderson  --provided Mrs. Valeria Anderson with [facility] and phone # to use as needed    Next call, review above    04/22/22     --pt had PCP F/U visit on 4/20 and his wife (who he gave me verbal permission to speak to) says it went well  --pt had VV with surgeon the week before, and his wife reports this went well with no issues  --pt is continuing to have nurse and PT visits through [facility] and Mrs. Valeria Anderson reports he is participating in exercises they've given him on other days as well. 05/06/22     --Mrs. Valeria Anderson tells me pt had his last [facility] nurse visit this week and will have PT continuing for 5 more weeks. She feels that every day he is showing signs of more improvement. Bambi Aleman DNP, FNP-C, Care Transitions Team, (Ph) [phone]        Understands red flags post discharge. 04/08/22     --Mrs. Valeria Anderson states that pt is feeling fatigued and has had diarrhea but [address] told her the incision \"looks great\"   --answered her questions about low-fiber diet  --encouraged pt to walk a little bit every day  --discussed pain management to enable him to work with therapy; can use Tylenol if not needing opioid pain medication.  Goal is to be able to take deep breaths and increase physical activity    Next call reassess symptoms, especially diarrhea, nutrition, activity level and pain.    04/22/22     --pt's diarrhea is much better now, per Mrs. Imani Rivera. [facility] say the incision is clear and looks good; and pt's wife also agrees. Pt's energy is slowly improving. He is not having pain except for during PT sessions. He continues on a low-fiber diet for now.  --advised Mrs. Imani Rivera that the living will on file with us does not contain the names and [facility] as is sometimes the case. She thinks the document they have at home has more information and she agrees to look for this in the coming week so we can review together and I can give them options if they choose to revise.  --Mr. Imani Rivera wants to know what he can use for pain before therapy sessions. He is reluctant to take opioid. I recommended either 2 regular strength Tylenol or if that doesn't help enough, 2 extra-strength Tylenol about 30-45 minutes before the start of therapy. Next call, ask if they would like to review living will documents, and ask how well Tylenol helped for PT.    05/06/22     --Mrs. Imani Rivera tells me she hasn't looked at the living will yet, but when she does, she will call me to review it  --pt has not needed any pain medication prior to PT sessions. Mrs. Imani Rivera tells me he has run a couple of errands with her and has done well  --she informs me that the diarrhea has completely resolved!  --she states the only problem he is having now is with sleep and the [facility] nurse suggested they try Tylenol PM which he did last night and seemed to rest better    I thanked Mrs. Imani Rivera for her time, advised this is my final call but for her to keep my name and number and call me with any questions/concerns she wishes to discuss with me. I wished her a Happy Mother's Day and we disconnected. Paula Hankins DNP, FNP-C, Care Transitions Team, (Ph) [phone]               Patient has Care Transition Nurse's contact information for any further questions, concerns, or needs.     Patients upcoming visits:  No future appointments.

## 2022-05-06 NOTE — PROGRESS NOTES
4/22/22    Goals      Attends follow-up appointments as directed. 04/08/22     --pt will obtain and attend 1-wk F/U visit with PCP in-person or remotely  --pt will obtain and attend 2-wk F/U appt with surgeon  --pt has had initial intake with [facility] nurse and will have 5 more visits from her and PT twice weekly, per Mrs. Dayton Zhang  --provided Mrs. Dayton Zhang with [facility] and phone # to use as needed    Next call, review above    04/22/22     --pt had PCP F/U visit on 4/20 and his wife (who he gave me verbal permission to speak to) says it went well  --pt had VV with surgeon the week before, and his wife reports this went well with no issues  --pt is continuing to have nurse and PT visits through [facility] and Mrs. Dayton Zhang reports he is participating in exercises they've given him on other days as well. Jenna Villatoro DNP, FNP-C, Care Transitions Team, (Ph) [phone]        Understands red flags post discharge. 04/08/22     --Mrs. Dayton Zhang states that pt is feeling fatigued and has had diarrhea but [address] told her the incision \"looks great\"   --answered her questions about low-fiber diet  --encouraged pt to walk a little bit every day  --discussed pain management to enable him to work with therapy; can use Tylenol if not needing opioid pain medication. Goal is to be able to take deep breaths and increase physical activity    Next call reassess symptoms, especially diarrhea, nutrition, activity level and pain. 04/22/22     --pt's diarrhea is much better now, per Mrs. Dayton Zhang. The [facility] nurses say the incision is clear and looks good; and pt's wife also agrees. Pt's energy is slowly improving. He is not having pain except for during PT sessions. He continues on a low-fiber diet for now.  --advised Mrs. Dayton Zhang that the living will on file with us does not contain the names and contact #s of [facility], as is sometimes the case.  She thinks the document they have at home has more information and she agrees to look for this in the coming week so we can review together and I can give them options if they choose to revise.  --Mr. Celia Garcia wants to know what he can use for pain before therapy sessions. He is reluctant to take opioid. I recommended either 2 regular strength Tylenol or if that doesn't help enough, 2 extra-strength Tylenol about 30-45 minutes before the start of therapy. Next call, ask if they would like to review living will documents, and ask how well Tylenol helped for PT.     Wesly Cortés, SANTINO, FNP-C, Care Transitions Team, (Ph) [phone]

## 2023-06-28 ENCOUNTER — HOSPITAL ENCOUNTER (OUTPATIENT)
Facility: HOSPITAL | Age: 88
Discharge: HOME | End: 2023-07-01
Payer: MEDICARE

## 2023-06-28 DIAGNOSIS — M54.17 LUMBOSACRAL NEURITIS: ICD-10-CM

## 2023-06-28 PROCEDURE — 72100 X-RAY EXAM L-S SPINE 2/3 VWS: CPT

## 2023-09-11 ENCOUNTER — TRANSCRIBE ORDERS (OUTPATIENT)
Facility: HOSPITAL | Age: 88
End: 2023-09-11

## 2023-09-11 ENCOUNTER — HOSPITAL ENCOUNTER (OUTPATIENT)
Facility: HOSPITAL | Age: 88
Discharge: HOME | End: 2023-09-14
Payer: MEDICARE

## 2023-09-11 DIAGNOSIS — M54.6 PAIN IN THORACIC SPINE: Primary | ICD-10-CM

## 2023-09-11 DIAGNOSIS — M54.6 PAIN IN THORACIC SPINE: ICD-10-CM

## 2023-09-11 PROCEDURE — 72074 X-RAY EXAM THORAC SPINE4/>VW: CPT

## 2023-10-26 ENCOUNTER — HOSPITAL ENCOUNTER (OUTPATIENT)
Facility: HOSPITAL | Age: 88
Setting detail: RECURRING SERIES
Discharge: HOME | End: 2023-10-29
Payer: MEDICARE

## 2023-10-26 PROCEDURE — 97162 PT EVAL MOD COMPLEX 30 MIN: CPT

## 2023-10-26 PROCEDURE — 97110 THERAPEUTIC EXERCISES: CPT

## 2023-10-26 NOTE — THERAPY EVALUATION
Physical Therapy at [employer],   a part of [address] [place] [address], [facility] [address]  Phone: [phone]  Fax: [phone]       PHYSICAL THERAPY - MEDICARE EVALUATION/PLAN OF CARE NOTE (updated 3/23)      Date: 10/26/2023          Patient Name:  Murtaza Enrique :  3/26/1933   Medical   Diagnosis:  Other low back pain [M54.59] Treatment Diagnosis:  R26.81   Unsteadiness on feet M62.81  GENERAL MUSCLE WEAKNESS    Referral Source:  REKHA Donato* Provider #:  [id number]                Insurance: Payor: MEDICARE / Plan: MEDICARE PART A AND B / Product Type: *No Product type* /      Patient  verified yes     Visit #   Current  / Total 1 Max allowed   Time   In / Out 12:45p 1:45p   Total Treatment Time 60   Total Timed Codes 15   1:1 Treatment Time 15    MC BC Totals Reminder:  bill using total billable   min of TIMED therapeutic procedures and modalities. 8-22 min = 1 unit; 23-37 min = 2 units; 38-52 min = 3 units;  53-67 min = 4 units; 68-82 min = 5 units           SUBJECTIVE  Pain Level (0-10 scale): Current: 4 Worst: 4  [x]constant [x]intermittent []improving []worsening []no change since onset    Any medication changes, allergies to medications, adverse drug reactions, diagnosis change, or new procedure performed?: [x] No    [] Yes (see summary sheet for update)  Medications: Verified on Patient Summary List    Subjective functional status/changes:     Mr. Toyin Eli is a 80year old male presenting to PT c/o of LE weakness and instability with walking that has been bothering him, for over a year. Patient lives with his wife and is c/o of unsteadiness and weakness when doing yard work, and reaching for household objects below his feet. Pt reports no recent falls but wants to understand his deficits and gain strength in order to improve his mobility and avoid falling as he ages.  Pt reports soreness and fatigue in the lower back when

## 2023-11-01 ENCOUNTER — HOSPITAL ENCOUNTER (OUTPATIENT)
Facility: HOSPITAL | Age: 88
Setting detail: RECURRING SERIES
Discharge: HOME | End: 2023-11-04
Payer: MEDICARE

## 2023-11-01 PROCEDURE — 97110 THERAPEUTIC EXERCISES: CPT

## 2023-11-01 PROCEDURE — 97112 NEUROMUSCULAR REEDUCATION: CPT

## 2023-11-01 NOTE — PROGRESS NOTES
PHYSICAL THERAPY - MEDICARE DAILY TREATMENT NOTE (updated 3/23)      Date: 2023          Patient Name:  Erasmo Joyner :  3/26/1933   Medical   Diagnosis:  Other low back pain [M54.59] Treatment Diagnosis:  R26.81   Unsteadiness on feet M62.81  GENERAL MUSCLE WEAKNESS    Referral Source:  REKHA Renee* Insurance:   Payor: MEDICARE / Plan: MEDICARE PART A AND B / Product Type: *No Product type* /                     Patient  verified yes     Visit #   Current  / Total 2 Max  allowed   Time   In / Out 12:15p 1:10p   Total Treatment Time 55   Total Timed Codes 55   1:1 Treatment Time 54       BC Totals Reminder:  bill using total billable   min of TIMED therapeutic procedures and modalities. 8-22 min = 1 unit; 23-37 min = 2 units; 38-52 min = 3 units; 53-67 min = 4 units; 68-82 min = 5 units            SUBJECTIVE    Pain Level (0-10 scale): 0    Any medication changes, allergies to medications, adverse drug reactions, diagnosis change, or new procedure performed?: [x] No    [] Yes (see summary sheet for update)  Medications: Verified on Patient Summary List    Subjective functional status/changes:     Patient reporting follow through with HEP. Noting some DOMS afterwards but to tolerance    OBJECTIVE      Therapeutic Procedures: Tx Min Billable or 1:1 Min (if diff from Tx Min) Procedure, Rationale, Specifics   30  33718 Therapeutic Exercise (timed):  increase ROM, strength, coordination, balance, and proprioception to improve patient's ability to progress to PLOF and address remaining functional goals. (see flow sheet as applicable)     Details if applicable:     58 70640 Neuromuscular Re-Education (timed):  improve balance, coordination, kinesthetic sense, posture, core stability and proprioception to improve patient's ability to develop conscious control of individual muscles and awareness of position of extremities in order to progress to PLOF and address remaining functional goals.

## 2023-11-07 ENCOUNTER — HOSPITAL ENCOUNTER (OUTPATIENT)
Facility: HOSPITAL | Age: 88
Setting detail: RECURRING SERIES
Discharge: HOME | End: 2023-11-10
Payer: MEDICARE

## 2023-11-07 PROCEDURE — 97110 THERAPEUTIC EXERCISES: CPT

## 2023-11-07 PROCEDURE — 97112 NEUROMUSCULAR REEDUCATION: CPT

## 2023-11-07 NOTE — PROGRESS NOTES
PHYSICAL THERAPY - MEDICARE DAILY TREATMENT NOTE (updated 3/23)      Date: 2023          Patient Name:  Garrett Velazquez :  3/26/1933   Medical   Diagnosis:  Other low back pain [M54.59] Treatment Diagnosis:  R26.81   Unsteadiness on feet M62.81  GENERAL MUSCLE WEAKNESS    Referral Source:  REKHA Frazier* Insurance:   Payor: MEDICARE / Plan: MEDICARE PART A AND B / Product Type: *No Product type* /                     Patient  verified yes     Visit #   Current  / Total 3 Max  allowed   Time   In / Out 3:15p 4:00p   Total Treatment Time 45   Total Timed Codes 45   1:1 Treatment Time 39      MC BC Totals Reminder:  bill using total billable   min of TIMED therapeutic procedures and modalities. 8-22 min = 1 unit; 23-37 min = 2 units; 38-52 min = 3 units; 53-67 min = 4 units; 68-82 min = 5 units            SUBJECTIVE    Pain Level (0-10 scale): 0    Any medication changes, allergies to medications, adverse drug reactions, diagnosis change, or new procedure performed?: [x] No    [] Yes (see summary sheet for update)  Medications: Verified on Patient Summary List    Subjective functional status/changes:     Patient reporting some stiffness in the legs     OBJECTIVE    Therapeutic Procedures: Tx Min Billable or 1:1 Min (if diff from Tx Min) Procedure, Rationale, Specifics   30  82378 Therapeutic Exercise (timed):  increase ROM, strength, coordination, balance, and proprioception to improve patient's ability to progress to PLOF and address remaining functional goals. (see flow sheet as applicable)     Details if applicable:     15  58300 Neuromuscular Re-Education (timed):  improve balance, coordination, kinesthetic sense, posture, core stability and proprioception to improve patient's ability to develop conscious control of individual muscles and awareness of position of extremities in order to progress to PLOF and address remaining functional goals.  (see flow sheet as applicable)     Details if

## 2023-11-09 ENCOUNTER — HOSPITAL ENCOUNTER (OUTPATIENT)
Facility: HOSPITAL | Age: 88
Setting detail: RECURRING SERIES
Discharge: HOME | End: 2023-11-12
Payer: MEDICARE

## 2023-11-09 PROCEDURE — 97112 NEUROMUSCULAR REEDUCATION: CPT

## 2023-11-09 PROCEDURE — 97110 THERAPEUTIC EXERCISES: CPT

## 2023-11-09 NOTE — PROGRESS NOTES
PHYSICAL THERAPY - MEDICARE DAILY TREATMENT NOTE (updated 3/23)      Date: 2023          Patient Name:  Pancho Doyle :  3/26/1933   Medical   Diagnosis:  Other low back pain [M54.59] Treatment Diagnosis:  R26.81   Unsteadiness on feet M62.81  GENERAL MUSCLE WEAKNESS    Referral Source:  REKHA Florez* Insurance:   Payor: MEDICARE / Plan: MEDICARE PART A AND B / Product Type: *No Product type* /                     Patient  verified yes     Visit #   Current  / Total 4 Max  allowed   Time   In / Out 1:00p 1:45p   Total Treatment Time 45   Total Timed Codes 45   1:1 Treatment Time 39      MC BC Totals Reminder:  bill using total billable   min of TIMED therapeutic procedures and modalities. 8-22 min = 1 unit; 23-37 min = 2 units; 38-52 min = 3 units; 53-67 min = 4 units; 68-82 min = 5 units            SUBJECTIVE    Pain Level (0-10 scale): 0    Any medication changes, allergies to medications, adverse drug reactions, diagnosis change, or new procedure performed?: [x] No    [] Yes (see summary sheet for update)  Medications: Verified on Patient Summary List    Subjective functional status/changes:     Patient reporting some stiffness in the legs more so in quads than the hips     OBJECTIVE    Therapeutic Procedures: Tx Min Billable or 1:1 Min (if diff from Tx Min) Procedure, Rationale, Specifics   15  52019 Therapeutic Exercise (timed):  increase ROM, strength, coordination, balance, and proprioception to improve patient's ability to progress to PLOF and address remaining functional goals. (see flow sheet as applicable)     Details if applicable:     30  63048 Neuromuscular Re-Education (timed):  improve balance, coordination, kinesthetic sense, posture, core stability and proprioception to improve patient's ability to develop conscious control of individual muscles and awareness of position of extremities in order to progress to PLOF and address remaining functional goals.  (see flow sheet

## 2023-11-14 ENCOUNTER — HOSPITAL ENCOUNTER (OUTPATIENT)
Facility: HOSPITAL | Age: 88
Setting detail: RECURRING SERIES
Discharge: HOME | End: 2023-11-17
Payer: MEDICARE

## 2023-11-14 PROCEDURE — 97112 NEUROMUSCULAR REEDUCATION: CPT

## 2023-11-14 PROCEDURE — 97110 THERAPEUTIC EXERCISES: CPT

## 2023-11-14 NOTE — PROGRESS NOTES
PHYSICAL THERAPY - MEDICARE DAILY TREATMENT NOTE (updated 3/23)      Date: 2023          Patient Name:  Tim Miller :  3/26/1933   Medical   Diagnosis:  Other low back pain [M54.59] Treatment Diagnosis:  R26.81   Unsteadiness on feet M62.81  GENERAL MUSCLE WEAKNESS    Referral Source:  REKHA Chicas* Insurance:   Payor: MEDICARE / Plan: MEDICARE PART A AND B / Product Type: *No Product type* /                     Patient  verified yes     Visit #   Current  / Total 5 Max  allowed   Time   In / Out 1:00p 1:45p   Total Treatment Time 45   Total Timed Codes 45   1:1 Treatment Time 39      [facility] Totals Reminder:  bill using total billable   min of TIMED therapeutic procedures and modalities. 8-22 min = 1 unit; 23-37 min = 2 units; 38-52 min = 3 units; 53-67 min = 4 units; 68-82 min = 5 units            SUBJECTIVE    Pain Level (0-10 scale): 0    Any medication changes, allergies to medications, adverse drug reactions, diagnosis change, or new procedure performed?: [x] No    [] Yes (see summary sheet for update)  Medications: Verified on Patient Summary List    Subjective functional status/changes:     Patient reporting fatigue when out at the mall Friday the day following PT last week. Noting he needed a rest break while out and was fatigued the rest the day, recovered the next day without lingering effect. OBJECTIVE    Therapeutic Procedures: Tx Min Billable or 1:1 Min (if diff from Tx Min) Procedure, Rationale, Specifics   15  22246 Therapeutic Exercise (timed):  increase ROM, strength, coordination, balance, and proprioception to improve patient's ability to progress to PLOF and address remaining functional goals.  (see flow sheet as applicable)     Details if applicable:     30  16242 Neuromuscular Re-Education (timed):  improve balance, coordination, kinesthetic sense, posture, core stability and proprioception to improve patient's ability to develop conscious control of individual

## 2023-11-16 ENCOUNTER — HOSPITAL ENCOUNTER (OUTPATIENT)
Facility: HOSPITAL | Age: 88
Setting detail: RECURRING SERIES
Discharge: HOME | End: 2023-11-19
Payer: MEDICARE

## 2023-11-16 PROCEDURE — 97112 NEUROMUSCULAR REEDUCATION: CPT

## 2023-11-16 PROCEDURE — 97110 THERAPEUTIC EXERCISES: CPT

## 2023-11-16 NOTE — PROGRESS NOTES
PHYSICAL THERAPY - MEDICARE DAILY TREATMENT NOTE (updated 3/23)      Date: 2023          Patient Name:  Wero Elias :  3/26/1933   Medical   Diagnosis:  Other low back pain [M54.59] Treatment Diagnosis:  R26.81   Unsteadiness on feet M62.81  GENERAL MUSCLE WEAKNESS    Referral Source:  REKHA Jerez* Insurance:   Payor: MEDICARE / Plan: MEDICARE PART A AND B / Product Type: *No Product type* /                     Patient  verified yes     Visit #   Current  / Total 6 16   Time   In / Out 10:15a 11:00a   Total Treatment Time 45   Total Timed Codes 45   1:1 Treatment Time 39      [facility] Totals Reminder:  bill using total billable   min of TIMED therapeutic procedures and modalities. 8-22 min = 1 unit; 23-37 min = 2 units; 38-52 min = 3 units; 53-67 min = 4 units; 68-82 min = 5 units            SUBJECTIVE    Pain Level (0-10 scale): 0    Any medication changes, allergies to medications, adverse drug reactions, diagnosis change, or new procedure performed?: [x] No    [] Yes (see summary sheet for update)  Medications: Verified on Patient Summary List    Subjective functional status/changes:     Patient reporting fatigue while doing yard work yesterday. Worked for 3 hours with a backpack leaf blower. Rest and water breaks were helpful in pacing    OBJECTIVE    Therapeutic Procedures: Tx Min Billable or 1:1 Min (if diff from Tx Min) Procedure, Rationale, Specifics   15  53146 Therapeutic Exercise (timed):  increase ROM, strength, coordination, balance, and proprioception to improve patient's ability to progress to PLOF and address remaining functional goals.  (see flow sheet as applicable)     Details if applicable:     30  37711 Neuromuscular Re-Education (timed):  improve balance, coordination, kinesthetic sense, posture, core stability and proprioception to improve patient's ability to develop conscious control of individual muscles and awareness of position of extremities in order to progress

## 2023-11-21 ENCOUNTER — HOSPITAL ENCOUNTER (OUTPATIENT)
Facility: HOSPITAL | Age: 88
Setting detail: RECURRING SERIES
Discharge: HOME | End: 2023-11-24
Payer: MEDICARE

## 2023-11-21 PROCEDURE — 97112 NEUROMUSCULAR REEDUCATION: CPT

## 2023-11-21 PROCEDURE — 97110 THERAPEUTIC EXERCISES: CPT

## 2023-11-21 NOTE — PROGRESS NOTES
PHYSICAL THERAPY - MEDICARE DAILY TREATMENT NOTE (updated 3/23)      Date: 2023          Patient Name:  Richie Corrales :  3/26/1933   Medical   Diagnosis:  Other low back pain [M54.59] Treatment Diagnosis:  R26.81   Unsteadiness on feet M62.81  GENERAL MUSCLE WEAKNESS    Referral Source:  REKHA Gustafson* Insurance:   Payor: MEDICARE / Plan: MEDICARE PART A AND B / Product Type: *No Product type* /                     Patient  verified yes     Visit #   Current  / Total 7 16   Time   In / Out 1:00p 2:00p   Total Treatment Time 60   Total Timed Codes 60   1:1 Treatment Time 60      [facility] Totals Reminder:  bill using total billable   min of TIMED therapeutic procedures and modalities. 8-22 min = 1 unit; 23-37 min = 2 units; 38-52 min = 3 units; 53-67 min = 4 units; 68-82 min = 5 units            SUBJECTIVE    Pain Level (0-10 scale): 0    Any medication changes, allergies to medications, adverse drug reactions, diagnosis change, or new procedure performed?: [x] No    [] Yes (see summary sheet for update)  Medications: Verified on Patient Summary List    Subjective functional status/changes:     Patient reporting some stiffness in the legs and continued fatigue in the legs when he is walking for > 20-30 mins    OBJECTIVE    Therapeutic Procedures: Tx Min Billable or 1:1 Min (if diff from Tx Min) Procedure, Rationale, Specifics   30  27262 Therapeutic Exercise (timed):  increase ROM, strength, coordination, balance, and proprioception to improve patient's ability to progress to PLOF and address remaining functional goals.  (see flow sheet as applicable)     Details if applicable:     30  96279 Neuromuscular Re-Education (timed):  improve balance, coordination, kinesthetic sense, posture, core stability and proprioception to improve patient's ability to develop conscious control of individual muscles and awareness of position of extremities in order to progress to PLOF and address remaining

## 2023-11-27 ENCOUNTER — HOSPITAL ENCOUNTER (OUTPATIENT)
Facility: HOSPITAL | Age: 88
Setting detail: RECURRING SERIES
Discharge: HOME | End: 2023-11-30
Payer: MEDICARE

## 2023-11-27 PROCEDURE — 97110 THERAPEUTIC EXERCISES: CPT

## 2023-11-27 PROCEDURE — 97112 NEUROMUSCULAR REEDUCATION: CPT

## 2023-11-27 NOTE — PROGRESS NOTES
PHYSICAL THERAPY - MEDICARE DAILY TREATMENT NOTE (updated 3/23)      Date: 2023          Patient Name:  Erasmo Joyner :  3/26/1933   Medical   Diagnosis:  Other low back pain [M54.59] Treatment Diagnosis:  R26.81   Unsteadiness on feet M62.81  GENERAL MUSCLE WEAKNESS    Referral Source:  REKHA Renee* Insurance:   Payor: MEDICARE / Plan: MEDICARE PART A AND B / Product Type: *No Product type* /                     Patient  verified yes     Visit #   Current  / Total 8 16   Time   In / Out 1:00p 2:00p   Total Treatment Time 45   Total Timed Codes 45   1:1 Treatment Time 39      [facility] Totals Reminder:  bill using total billable   min of TIMED therapeutic procedures and modalities. 8-22 min = 1 unit; 23-37 min = 2 units; 38-52 min = 3 units; 53-67 min = 4 units; 68-82 min = 5 units            SUBJECTIVE    Pain Level (0-10 scale): 0    Any medication changes, allergies to medications, adverse drug reactions, diagnosis change, or new procedure performed?: [x] No    [] Yes (see summary sheet for update)  Medications: Verified on Patient Summary List    Subjective functional status/changes:     Patient reporting that he is feeling stronger. Patient reports that he feels he isnt \"moving like I'd like to\" feeling he is feeling leg stiffness upon initiating walking. Paitnet reproting walking for extended periods of time     OBJECTIVE    Therapeutic Procedures: Tx Min Billable or 1:1 Min (if diff from Tx Min) Procedure, Rationale, Specifics   30  99339 Therapeutic Exercise (timed):  increase ROM, strength, coordination, balance, and proprioception to improve patient's ability to progress to PLOF and address remaining functional goals.  (see flow sheet as applicable)     Details if applicable:     15  26048 Neuromuscular Re-Education (timed):  improve balance, coordination, kinesthetic sense, posture, core stability and proprioception to improve patient's ability to develop conscious control of

## 2023-11-28 NOTE — PROGRESS NOTES
Physical Therapy at [facility],   a part of [address] [place] [address] [address]  Phone: [phone]  Fax: [phone]  PHYSICAL THERAPY PROGRESS NOTE  Patient Name:  Enedina Anthony :  3/26/1933   Treatment/Medical Diagnosis: Other low back pain [M54.59]   Referral Source:  Monda Cranker, APRN*     Date of Initial Visit:  10/26/23 Attended Visits:  8 Missed Visits:  0     SUMMARY OF TREATMENT/ASSESSMENT:  54695 Therapeutic Exercise, 24552 Neuromuscular Re-Education, 00079 Manual Therapy, 37457 Therapeutic Activity, and 83486 Gait Training    Mr. Sheila Morris is progressing well with PT. Pt is demonstrating improvement in functional mobility with TUG and 5x sit <> stand, improvement in lumbar mobility, and improved activity tolerance. Pt continues to demonstrate impaired gait mechanics, impaired balance, and impaired  hip mobility. These limitations reduce the pts ability to ambulate in the community without need for rest break, perform yard work ADLs, and lift from the ground without limitation. CURRENT STATUS  Patient reporting that he is feeling stronger. Patient reports that he feels he isnt \"moving like I'd like to\" feeling he is feeling leg stiffness upon initiating walking.  Patient reporting walking for extended periods of time remains fatiguing req seated rest breaks    Other Objective/Functional Measures     Functional Mobility:     Gait: Reduced foot clearance, decreased hip extension, no trendelenberg noted         Sit <> stand: stepping strategies req upon first sit <> stand, able to perform without HHA x5     SL stance:         Min A and R arm HHA able to maintain 20s     Range of Motion  Lumbar:     ROM % AROM   Forward flexion  To toes   Extension  25%   Rotation right  50%   Rotation left  50%         Hip:  R                      L  Flexion:                       100                  100        Extension:

## 2023-11-29 ENCOUNTER — HOSPITAL ENCOUNTER (OUTPATIENT)
Facility: HOSPITAL | Age: 88
Setting detail: RECURRING SERIES
Discharge: HOME | End: 2023-12-02
Payer: MEDICARE

## 2023-11-29 PROCEDURE — 97110 THERAPEUTIC EXERCISES: CPT

## 2023-11-29 PROCEDURE — 97112 NEUROMUSCULAR REEDUCATION: CPT

## 2023-11-29 NOTE — PROGRESS NOTES
PHYSICAL THERAPY - MEDICARE DAILY TREATMENT NOTE (updated 3/23)      Date: 2023          Patient Name:  Bj Alcantar :  3/26/1933   Medical   Diagnosis:  Other low back pain [M54.59] Treatment Diagnosis:  R26.81   Unsteadiness on feet M62.81  GENERAL MUSCLE WEAKNESS    Referral Source:  REKHA Giordano* Insurance:   Payor: MEDICARE / Plan: MEDICARE PART A AND B / Product Type: *No Product type* /                     Patient  verified yes     Visit #   Current  / Total 9 16   Time   In / Out 10:10 am 10:55 am   Total Treatment Time 45   Total Timed Codes 45   1:1 Treatment Time 39      [facility] Totals Reminder:  bill using total billable   min of TIMED therapeutic procedures and modalities. 8-22 min = 1 unit; 23-37 min = 2 units; 38-52 min = 3 units; 53-67 min = 4 units; 68-82 min = 5 units            SUBJECTIVE    Pain Level (0-10 scale): 0    Any medication changes, allergies to medications, adverse drug reactions, diagnosis change, or new procedure performed?: [x] No    [] Yes (see summary sheet for update)  Medications: Verified on Patient Summary List    Subjective functional status/changes:     Patient reports continued progress at this time and stated he really likes focusing on his balance. OBJECTIVE    Therapeutic Procedures: Tx Min Billable or 1:1 Min (if diff from Tx Min) Procedure, Rationale, Specifics   30  89617 Therapeutic Exercise (timed):  increase ROM, strength, coordination, balance, and proprioception to improve patient's ability to progress to PLOF and address remaining functional goals.  (see flow sheet as applicable)     Details if applicable:     15  80784 Neuromuscular Re-Education (timed):  improve balance, coordination, kinesthetic sense, posture, core stability and proprioception to improve patient's ability to develop conscious control of individual muscles and awareness of position of extremities in order to progress to PLOF and address remaining functional

## 2023-12-07 ENCOUNTER — HOSPITAL ENCOUNTER (OUTPATIENT)
Facility: HOSPITAL | Age: 88
Setting detail: RECURRING SERIES
Discharge: HOME | End: 2023-12-10
Payer: MEDICARE

## 2023-12-07 PROCEDURE — 97110 THERAPEUTIC EXERCISES: CPT

## 2023-12-07 PROCEDURE — 97112 NEUROMUSCULAR REEDUCATION: CPT

## 2023-12-07 NOTE — PROGRESS NOTES
PHYSICAL THERAPY - MEDICARE DAILY TREATMENT NOTE (updated 3/23)      Date: 2023          Patient Name:  Floyd Bills :  3/26/1933   Medical   Diagnosis:  Other low back pain [M54.59] Treatment Diagnosis:  R26.81   Unsteadiness on feet M62.81  GENERAL MUSCLE WEAKNESS    Referral Source:  Sung Essex, APRN* Insurance:   Payor: MEDICARE / Plan: MEDICARE PART A AND B / Product Type: *No Product type* /                     Patient  verified yes     Visit #   Current  / Total 10 16   Time   In / Out 1:45p 2:30p   Total Treatment Time 45   Total Timed Codes 45   1:1 Treatment Time 39      [facility] Totals Reminder:  bill using total billable   min of TIMED therapeutic procedures and modalities. 8-22 min = 1 unit; 23-37 min = 2 units; 38-52 min = 3 units; 53-67 min = 4 units; 68-82 min = 5 units            SUBJECTIVE    Pain Level (0-10 scale): 0    Any medication changes, allergies to medications, adverse drug reactions, diagnosis change, or new procedure performed?: [x] No    [] Yes (see summary sheet for update)  Medications: Verified on Patient Summary List    Subjective functional status/changes:     Patient reporting fatigue when out walking in department stores >45 mins - an hour    OBJECTIVE    Therapeutic Procedures: Tx Min Billable or 1:1 Min (if diff from Tx Min) Procedure, Rationale, Specifics   30  47747 Therapeutic Exercise (timed):  increase ROM, strength, coordination, balance, and proprioception to improve patient's ability to progress to PLOF and address remaining functional goals. (see flow sheet as applicable)     Details if applicable:     15  39021 Neuromuscular Re-Education (timed):  improve balance, coordination, kinesthetic sense, posture, core stability and proprioception to improve patient's ability to develop conscious control of individual muscles and awareness of position of extremities in order to progress to PLOF and address remaining functional goals.  (see flow sheet as

## 2023-12-21 ENCOUNTER — HOSPITAL ENCOUNTER (OUTPATIENT)
Facility: HOSPITAL | Age: 88
Setting detail: RECURRING SERIES
Discharge: HOME | End: 2023-12-24
Payer: MEDICARE

## 2023-12-21 PROCEDURE — 97110 THERAPEUTIC EXERCISES: CPT

## 2023-12-21 PROCEDURE — 97112 NEUROMUSCULAR REEDUCATION: CPT

## 2023-12-21 NOTE — PROGRESS NOTES
able to ambulate at home without fear of falling or fatigue MET  FOTO > 65  MET  Patient will be able to walk for 45 mins in the community without rest break - NEW      PLAN  Yes  Continue plan of care  Re-Cert Due: 90 days (6/01/1667)  [x]  Upgrade activities as tolerated  []  Discharge due to :  []  Other:      Yennifer Pro, PTA      12/21/2023       1:34 PM

## 2023-12-28 ENCOUNTER — HOSPITAL ENCOUNTER (OUTPATIENT)
Facility: HOSPITAL | Age: 88
Setting detail: RECURRING SERIES
Discharge: HOME | End: 2023-12-31
Payer: MEDICARE

## 2023-12-28 PROCEDURE — 97112 NEUROMUSCULAR REEDUCATION: CPT

## 2023-12-28 PROCEDURE — 97110 THERAPEUTIC EXERCISES: CPT

## 2023-12-28 NOTE — PROGRESS NOTES
PHYSICAL THERAPY - MEDICARE DAILY TREATMENT NOTE (updated 3/23)      Date: 2023          Patient Name:  Dallas Daniels :  3/26/1933   Medical   Diagnosis:  Other low back pain [M54.59] Treatment Diagnosis:  R26.81   Unsteadiness on feet M62.81  GENERAL MUSCLE WEAKNESS    Referral Source:  Izzy Ingram APRN* Insurance:   Payor: MEDICARE / Plan: MEDICARE PART A AND B / Product Type: *No Product type* /                     Patient  verified yes     Visit #   Current  / Total 13 16   Time   In / Out 10:15a 11:00a   Total Treatment Time 45   Total Timed Codes 45   1:1 Treatment Time 45      [facility] Totals Reminder:  bill using total billable   min of TIMED therapeutic procedures and modalities.   8-22 min = 1 unit; 23-37 min = 2 units; 38-52 min = 3 units; 53-67 min = 4 units; 68-82 min = 5 units            SUBJECTIVE    Pain Level (0-10 scale): 0    Any medication changes, allergies to medications, adverse drug reactions, diagnosis change, or new procedure performed?: [x] No    [] Yes (see summary sheet for update)  Medications: Verified on Patient Summary List    Subjective functional status/changes:     Patient reporting improvements in endurance with walking. Cont to c/o balance challenges aroud nthe house and with community ambulation  OBJECTIVE    Therapeutic Procedures:  Tx Min Billable or 1:1 Min (if diff from Tx Min) Procedure, Rationale, Specifics   30  30407 Therapeutic Exercise (timed):  increase ROM, strength, coordination, balance, and proprioception to improve patient's ability to progress to PLOF and address remaining functional goals. (see flow sheet as applicable)     Details if applicable:     15  31008 Neuromuscular Re-Education (timed):  improve balance, coordination, kinesthetic sense, posture, core stability and proprioception to improve patient's ability to develop conscious control of individual muscles and awareness of position of extremities in order to progress to PLOF and

## 2023-12-28 NOTE — PROGRESS NOTES
Physical Therapy at [place],   a part of [address], [address]  [place] [address]  Phone: [phone]  Fax: [phone]  PHYSICAL THERAPY PROGRESS NOTE  Patient Name:  Dallas Daniels :  3/26/1933   Treatment/Medical Diagnosis: Other low back pain [M54.59]   Referral Source:  Izzy Ingram APRN*     Date of Initial Visit:  10/26/23 Attended Visits:  13 Missed Visits:  0     SUMMARY OF TREATMENT/ASSESSMENT:  81421 Therapeutic Exercise, 14631 Neuromuscular Re-Education, 42418 Manual Therapy, 60796 Therapeutic Activity, and 09477 Gait Training  Mr. Daniels is progressing well with PT. Pt is demonstrating improvement in functional mobility with TUG and 5x sit <> stand, improvement in lumbar mobility, and improved activity tolerance. Pt continues to demonstrate impaired gait mechanics and impaired balance as demonstrated by SHER balance score. These limitations reduce the pts ability to ambulate in the community without need for rest break, perform yard work ADLs, and lift from the ground without limitation.     CURRENT STATUS  Patient reporting improvements in endurance with walking. Cont to c/o balance challenges aroud nthe house and with community ambulation     Other Objective/Functional Measures   Objective/Functional Outcome Measure:   FOTO Score: 63 5x Sit to stand: 19s TUs SHER  Pain Level at end of session (0-10 scale): 0    Short Term Goals: To be accomplished in 8 treatments.  Patient will be ind with Hep without VC  Patient will be able to demonstrate improvement in LE strength to 5/5 in order to perform sit to stand with improved eccentric control - MET  Pt will be able to demonstrate SL stance for 10s without HHA in order to improve stability with dressing - Not MET     Long Term Goals: To be accomplished in 16 treatments.  Patient will be able to squat without HHA or excessive compensation in order to improve mobility with

## 2024-03-01 ENCOUNTER — HOSPITAL ENCOUNTER (OUTPATIENT)
Facility: HOSPITAL | Age: 89
End: 2024-03-01
Payer: MEDICARE

## 2024-03-01 DIAGNOSIS — M54.40 MIDLINE LOW BACK PAIN WITH SCIATICA, SCIATICA LATERALITY UNSPECIFIED, UNSPECIFIED CHRONICITY: ICD-10-CM

## 2024-03-01 PROCEDURE — 72110 X-RAY EXAM L-2 SPINE 4/>VWS: CPT

## 2024-10-01 ENCOUNTER — HOSPITAL ENCOUNTER (OUTPATIENT)
Facility: HOSPITAL | Age: 89
Discharge: HOME | End: 2024-10-03
Payer: MEDICARE

## 2024-10-01 VITALS
BODY MASS INDEX: 24.99 KG/M2 | HEIGHT: 65 IN | WEIGHT: 150 LBS | SYSTOLIC BLOOD PRESSURE: 130 MMHG | DIASTOLIC BLOOD PRESSURE: 70 MMHG

## 2024-10-01 DIAGNOSIS — R01.1 CARDIAC MURMUR: ICD-10-CM

## 2024-10-01 PROCEDURE — 93306 TTE W/DOPPLER COMPLETE: CPT

## 2024-10-02 LAB
ECHO AO ASC DIAM: 3.3 CM
ECHO AO ASCENDING AORTA INDEX: 1.89 CM/M2
ECHO AO ROOT DIAM: 3.9 CM
ECHO AO ROOT INDEX: 2.23 CM/M2
ECHO AV AREA PEAK VELOCITY: 1.7 CM2
ECHO AV AREA VTI: 1.8 CM2
ECHO AV AREA/BSA PEAK VELOCITY: 1 CM2/M2
ECHO AV AREA/BSA VTI: 1 CM2/M2
ECHO AV MEAN GRADIENT: 21 MMHG
ECHO AV MEAN VELOCITY: 2.2 M/S
ECHO AV PEAK GRADIENT: 35 MMHG
ECHO AV PEAK VELOCITY: 3 M/S
ECHO AV VELOCITY RATIO: 0.4
ECHO AV VTI: 58 CM
ECHO BSA: 1.77 M2
ECHO LA DIAMETER INDEX: 2.63 CM/M2
ECHO LA DIAMETER: 4.6 CM
ECHO LA TO AORTIC ROOT RATIO: 1.18
ECHO LA VOL A-L A2C: 89 ML (ref 18–58)
ECHO LA VOL A-L A4C: 86 ML (ref 18–58)
ECHO LA VOL BP: 85 ML (ref 18–58)
ECHO LA VOL MOD A2C: 86 ML (ref 18–58)
ECHO LA VOL MOD A4C: 81 ML (ref 18–58)
ECHO LA VOL/BSA BIPLANE: 49 ML/M2 (ref 16–34)
ECHO LA VOLUME AREA LENGTH: 89 ML
ECHO LA VOLUME INDEX A-L A2C: 51 ML/M2 (ref 16–34)
ECHO LA VOLUME INDEX A-L A4C: 49 ML/M2 (ref 16–34)
ECHO LA VOLUME INDEX AREA LENGTH: 51 ML/M2 (ref 16–34)
ECHO LA VOLUME INDEX MOD A2C: 49 ML/M2 (ref 16–34)
ECHO LA VOLUME INDEX MOD A4C: 46 ML/M2 (ref 16–34)
ECHO LV E' LATERAL VELOCITY: 3.5 CM/S
ECHO LV E' SEPTAL VELOCITY: 5.7 CM/S
ECHO LV EDV A2C: 76 ML
ECHO LV EDV A4C: 84 ML
ECHO LV EDV BP: 80 ML (ref 67–155)
ECHO LV EDV INDEX A4C: 48 ML/M2
ECHO LV EDV INDEX BP: 46 ML/M2
ECHO LV EDV NDEX A2C: 43 ML/M2
ECHO LV EJECTION FRACTION A2C: 71 %
ECHO LV EJECTION FRACTION A4C: 59 %
ECHO LV EJECTION FRACTION BIPLANE: 66 % (ref 55–100)
ECHO LV ESV A2C: 22 ML
ECHO LV ESV A4C: 35 ML
ECHO LV ESV BP: 27 ML (ref 22–58)
ECHO LV ESV INDEX A2C: 13 ML/M2
ECHO LV ESV INDEX A4C: 20 ML/M2
ECHO LV ESV INDEX BP: 15 ML/M2
ECHO LV FRACTIONAL SHORTENING: 33 % (ref 28–44)
ECHO LV INTERNAL DIMENSION DIASTOLE INDEX: 2.63 CM/M2
ECHO LV INTERNAL DIMENSION DIASTOLIC: 4.6 CM (ref 4.2–5.9)
ECHO LV INTERNAL DIMENSION SYSTOLIC INDEX: 1.77 CM/M2
ECHO LV INTERNAL DIMENSION SYSTOLIC: 3.1 CM
ECHO LV IVSD: 1.1 CM (ref 0.6–1)
ECHO LV MASS 2D: 169.9 G (ref 88–224)
ECHO LV MASS INDEX 2D: 97.1 G/M2 (ref 49–115)
ECHO LV POSTERIOR WALL DIASTOLIC: 1 CM (ref 0.6–1)
ECHO LV RELATIVE WALL THICKNESS RATIO: 0.43
ECHO LVOT AREA: 4.2 CM2
ECHO LVOT AV VTI INDEX: 0.42
ECHO LVOT DIAM: 2.3 CM
ECHO LVOT MEAN GRADIENT: 3 MMHG
ECHO LVOT PEAK GRADIENT: 6 MMHG
ECHO LVOT PEAK VELOCITY: 1.2 M/S
ECHO LVOT STROKE VOLUME INDEX: 57.4 ML/M2
ECHO LVOT SV: 100.5 ML
ECHO LVOT VTI: 24.2 CM
ECHO MV A VELOCITY: 1.55 M/S
ECHO MV AREA VTI: 3.2 CM2
ECHO MV E DECELERATION TIME (DT): 119.4 MS
ECHO MV E VELOCITY: 1.01 M/S
ECHO MV E/A RATIO: 0.65
ECHO MV E/E' LATERAL: 28.86
ECHO MV E/E' RATIO (AVERAGED): 23.29
ECHO MV E/E' SEPTAL: 17.72
ECHO MV LVOT VTI INDEX: 1.28
ECHO MV MAX VELOCITY: 1.7 M/S
ECHO MV MEAN GRADIENT: 5 MMHG
ECHO MV MEAN VELOCITY: 1 M/S
ECHO MV PEAK GRADIENT: 11 MMHG
ECHO MV VTI: 31 CM
ECHO PV MAX VELOCITY: 1.1 M/S
ECHO PV PEAK GRADIENT: 5 MMHG
ECHO RV FREE WALL PEAK S': 10.6 CM/S
ECHO RV INTERNAL DIMENSION: 3.7 CM
ECHO RV TAPSE: 2.3 CM (ref 1.7–?)

## 2024-10-02 PROCEDURE — 93306 TTE W/DOPPLER COMPLETE: CPT | Performed by: INTERNAL MEDICINE

## 2024-11-23 ENCOUNTER — HOSPITAL ENCOUNTER (OUTPATIENT)
Facility: HOSPITAL | Age: 89
End: 2024-11-23
Attending: ORTHOPAEDIC SURGERY
Payer: MEDICARE

## 2024-11-23 DIAGNOSIS — M54.16 LUMBAR RADICULOPATHY: ICD-10-CM
